# Patient Record
Sex: MALE | Race: WHITE | Employment: OTHER | URBAN - METROPOLITAN AREA
[De-identification: names, ages, dates, MRNs, and addresses within clinical notes are randomized per-mention and may not be internally consistent; named-entity substitution may affect disease eponyms.]

---

## 2017-10-13 ENCOUNTER — APPOINTMENT (OUTPATIENT)
Dept: MRI IMAGING | Age: 82
DRG: 418 | End: 2017-10-13
Attending: HOSPITALIST
Payer: MEDICARE

## 2017-10-13 ENCOUNTER — APPOINTMENT (OUTPATIENT)
Dept: ULTRASOUND IMAGING | Age: 82
DRG: 418 | End: 2017-10-13
Attending: EMERGENCY MEDICINE
Payer: MEDICARE

## 2017-10-13 ENCOUNTER — HOSPITAL ENCOUNTER (INPATIENT)
Age: 82
LOS: 6 days | Discharge: HOME | DRG: 418 | End: 2017-10-19
Attending: EMERGENCY MEDICINE | Admitting: HOSPITALIST
Payer: MEDICARE

## 2017-10-13 DIAGNOSIS — K85.90 ACUTE PANCREATITIS, UNSPECIFIED COMPLICATION STATUS, UNSPECIFIED PANCREATITIS TYPE: ICD-10-CM

## 2017-10-13 DIAGNOSIS — K80.20 GALLSTONES: ICD-10-CM

## 2017-10-13 DIAGNOSIS — R74.01 TRANSAMINITIS: Primary | ICD-10-CM

## 2017-10-13 LAB
ALBUMIN SERPL-MCNC: 3.6 G/DL (ref 3.5–5)
ALBUMIN/GLOB SERPL: 1.1 {RATIO} (ref 1.1–2.2)
ALP SERPL-CCNC: 184 U/L (ref 45–117)
ALT SERPL-CCNC: 425 U/L (ref 12–78)
ANION GAP SERPL CALC-SCNC: 8 MMOL/L (ref 5–15)
APPEARANCE UR: CLEAR
AST SERPL-CCNC: 672 U/L (ref 15–37)
BACTERIA URNS QL MICRO: NEGATIVE /HPF
BASOPHILS # BLD: 0 K/UL
BASOPHILS NFR BLD: 0 %
BILIRUB SERPL-MCNC: 2.1 MG/DL (ref 0.2–1)
BILIRUB UR QL: NEGATIVE
BUN SERPL-MCNC: 20 MG/DL (ref 6–20)
BUN/CREAT SERPL: 16 (ref 12–20)
CALCIUM SERPL-MCNC: 9.1 MG/DL (ref 8.5–10.1)
CHLORIDE SERPL-SCNC: 108 MMOL/L (ref 97–108)
CO2 SERPL-SCNC: 24 MMOL/L (ref 21–32)
COLOR UR: ABNORMAL
CREAT SERPL-MCNC: 1.23 MG/DL (ref 0.7–1.3)
DIFFERENTIAL METHOD BLD: ABNORMAL
EOSINOPHIL # BLD: 0 K/UL
EOSINOPHIL NFR BLD: 0 %
EPITH CASTS URNS QL MICRO: ABNORMAL /LPF
ERYTHROCYTE [DISTWIDTH] IN BLOOD BY AUTOMATED COUNT: 12.9 % (ref 11.5–14.5)
GLOBULIN SER CALC-MCNC: 3.2 G/DL (ref 2–4)
GLUCOSE BLD STRIP.AUTO-MCNC: 105 MG/DL (ref 65–100)
GLUCOSE BLD STRIP.AUTO-MCNC: 94 MG/DL (ref 65–100)
GLUCOSE SERPL-MCNC: 118 MG/DL (ref 65–100)
GLUCOSE UR STRIP.AUTO-MCNC: NEGATIVE MG/DL
HCT VFR BLD AUTO: 42.9 % (ref 36.6–50.3)
HGB BLD-MCNC: 14.6 G/DL (ref 12.1–17)
HGB UR QL STRIP: NEGATIVE
HYALINE CASTS URNS QL MICRO: ABNORMAL /LPF (ref 0–5)
KETONES UR QL STRIP.AUTO: NEGATIVE MG/DL
LACTATE SERPL-SCNC: 2 MMOL/L (ref 0.4–2)
LEUKOCYTE ESTERASE UR QL STRIP.AUTO: NEGATIVE
LIPASE SERPL-CCNC: >3000 U/L (ref 73–393)
LYMPHOCYTES # BLD: 0.4 K/UL
LYMPHOCYTES NFR BLD: 3 %
MCH RBC QN AUTO: 30.2 PG (ref 26–34)
MCHC RBC AUTO-ENTMCNC: 34 G/DL (ref 30–36.5)
MCV RBC AUTO: 88.6 FL (ref 80–99)
MONOCYTES # BLD: 0 K/UL
MONOCYTES NFR BLD: 0 %
NEUTS BAND NFR BLD MANUAL: 5 %
NEUTS SEG # BLD: 12.6 K/UL
NEUTS SEG NFR BLD: 92 %
NITRITE UR QL STRIP.AUTO: NEGATIVE
PH UR STRIP: 5.5 [PH] (ref 5–8)
PLATELET # BLD AUTO: 158 K/UL (ref 150–400)
POTASSIUM SERPL-SCNC: 3.9 MMOL/L (ref 3.5–5.1)
PROT SERPL-MCNC: 6.8 G/DL (ref 6.4–8.2)
PROT UR STRIP-MCNC: NEGATIVE MG/DL
RBC # BLD AUTO: 4.84 M/UL (ref 4.1–5.7)
RBC #/AREA URNS HPF: ABNORMAL /HPF (ref 0–5)
RBC MORPH BLD: ABNORMAL
SERVICE CMNT-IMP: ABNORMAL
SERVICE CMNT-IMP: NORMAL
SODIUM SERPL-SCNC: 140 MMOL/L (ref 136–145)
SP GR UR REFRACTOMETRY: 1.01 (ref 1–1.03)
UA: UC IF INDICATED,UAUC: ABNORMAL
UROBILINOGEN UR QL STRIP.AUTO: 2 EU/DL (ref 0.2–1)
WBC # BLD AUTO: 13 K/UL (ref 4.1–11.1)
WBC URNS QL MICRO: ABNORMAL /HPF (ref 0–4)

## 2017-10-13 PROCEDURE — 83605 ASSAY OF LACTIC ACID: CPT | Performed by: EMERGENCY MEDICINE

## 2017-10-13 PROCEDURE — 83690 ASSAY OF LIPASE: CPT | Performed by: EMERGENCY MEDICINE

## 2017-10-13 PROCEDURE — 82962 GLUCOSE BLOOD TEST: CPT

## 2017-10-13 PROCEDURE — 74011250636 HC RX REV CODE- 250/636: Performed by: HOSPITALIST

## 2017-10-13 PROCEDURE — 76705 ECHO EXAM OF ABDOMEN: CPT

## 2017-10-13 PROCEDURE — 65270000029 HC RM PRIVATE

## 2017-10-13 PROCEDURE — 85025 COMPLETE CBC W/AUTO DIFF WBC: CPT | Performed by: EMERGENCY MEDICINE

## 2017-10-13 PROCEDURE — 96361 HYDRATE IV INFUSION ADD-ON: CPT

## 2017-10-13 PROCEDURE — 99285 EMERGENCY DEPT VISIT HI MDM: CPT

## 2017-10-13 PROCEDURE — 74181 MRI ABDOMEN W/O CONTRAST: CPT

## 2017-10-13 PROCEDURE — 74011250636 HC RX REV CODE- 250/636: Performed by: EMERGENCY MEDICINE

## 2017-10-13 PROCEDURE — 96374 THER/PROPH/DIAG INJ IV PUSH: CPT

## 2017-10-13 PROCEDURE — 80053 COMPREHEN METABOLIC PANEL: CPT | Performed by: EMERGENCY MEDICINE

## 2017-10-13 PROCEDURE — 36415 COLL VENOUS BLD VENIPUNCTURE: CPT | Performed by: EMERGENCY MEDICINE

## 2017-10-13 PROCEDURE — 81001 URINALYSIS AUTO W/SCOPE: CPT | Performed by: EMERGENCY MEDICINE

## 2017-10-13 RX ORDER — MAGNESIUM SULFATE 100 %
4 CRYSTALS MISCELLANEOUS AS NEEDED
Status: DISCONTINUED | OUTPATIENT
Start: 2017-10-13 | End: 2017-10-19 | Stop reason: HOSPADM

## 2017-10-13 RX ORDER — ENOXAPARIN SODIUM 100 MG/ML
40 INJECTION SUBCUTANEOUS EVERY 24 HOURS
Status: DISCONTINUED | OUTPATIENT
Start: 2017-10-13 | End: 2017-10-14

## 2017-10-13 RX ORDER — SODIUM CHLORIDE 9 MG/ML
125 INJECTION, SOLUTION INTRAVENOUS CONTINUOUS
Status: DISCONTINUED | OUTPATIENT
Start: 2017-10-13 | End: 2017-10-13

## 2017-10-13 RX ORDER — MELATONIN
1000 DAILY
Status: DISCONTINUED | OUTPATIENT
Start: 2017-10-14 | End: 2017-10-19 | Stop reason: HOSPADM

## 2017-10-13 RX ORDER — SODIUM CHLORIDE 0.9 % (FLUSH) 0.9 %
5-10 SYRINGE (ML) INJECTION AS NEEDED
Status: DISCONTINUED | OUTPATIENT
Start: 2017-10-13 | End: 2017-10-19 | Stop reason: HOSPADM

## 2017-10-13 RX ORDER — ALBUTEROL SULFATE 0.83 MG/ML
2.5 SOLUTION RESPIRATORY (INHALATION)
COMMUNITY
End: 2017-11-30

## 2017-10-13 RX ORDER — ACETAMINOPHEN 325 MG/1
650 TABLET ORAL
Status: DISCONTINUED | OUTPATIENT
Start: 2017-10-13 | End: 2017-10-19 | Stop reason: HOSPADM

## 2017-10-13 RX ORDER — HYDROMORPHONE HYDROCHLORIDE 2 MG/ML
1 INJECTION, SOLUTION INTRAMUSCULAR; INTRAVENOUS; SUBCUTANEOUS
Status: DISCONTINUED | OUTPATIENT
Start: 2017-10-13 | End: 2017-10-16

## 2017-10-13 RX ORDER — SODIUM CHLORIDE, SODIUM LACTATE, POTASSIUM CHLORIDE, CALCIUM CHLORIDE 600; 310; 30; 20 MG/100ML; MG/100ML; MG/100ML; MG/100ML
150 INJECTION, SOLUTION INTRAVENOUS CONTINUOUS
Status: DISCONTINUED | OUTPATIENT
Start: 2017-10-13 | End: 2017-10-16 | Stop reason: SDUPTHER

## 2017-10-13 RX ORDER — DEXTROSE 50 % IN WATER (D50W) INTRAVENOUS SYRINGE
12.5-25 AS NEEDED
Status: DISCONTINUED | OUTPATIENT
Start: 2017-10-13 | End: 2017-10-19 | Stop reason: HOSPADM

## 2017-10-13 RX ORDER — HYDRALAZINE HYDROCHLORIDE 20 MG/ML
10 INJECTION INTRAMUSCULAR; INTRAVENOUS
Status: DISCONTINUED | OUTPATIENT
Start: 2017-10-13 | End: 2017-10-19 | Stop reason: HOSPADM

## 2017-10-13 RX ORDER — ONDANSETRON 4 MG/1
4 TABLET, ORALLY DISINTEGRATING ORAL
Status: DISCONTINUED | OUTPATIENT
Start: 2017-10-13 | End: 2017-10-19 | Stop reason: HOSPADM

## 2017-10-13 RX ORDER — ALBUTEROL SULFATE 0.83 MG/ML
2.5 SOLUTION RESPIRATORY (INHALATION)
Status: DISCONTINUED | OUTPATIENT
Start: 2017-10-13 | End: 2017-10-19 | Stop reason: HOSPADM

## 2017-10-13 RX ORDER — SODIUM CHLORIDE 0.9 % (FLUSH) 0.9 %
5-10 SYRINGE (ML) INJECTION EVERY 8 HOURS
Status: DISCONTINUED | OUTPATIENT
Start: 2017-10-13 | End: 2017-10-19 | Stop reason: HOSPADM

## 2017-10-13 RX ORDER — MORPHINE SULFATE 10 MG/ML
4 INJECTION, SOLUTION INTRAMUSCULAR; INTRAVENOUS
Status: COMPLETED | OUTPATIENT
Start: 2017-10-13 | End: 2017-10-13

## 2017-10-13 RX ORDER — INSULIN LISPRO 100 [IU]/ML
INJECTION, SOLUTION INTRAVENOUS; SUBCUTANEOUS EVERY 6 HOURS
Status: DISCONTINUED | OUTPATIENT
Start: 2017-10-13 | End: 2017-10-17

## 2017-10-13 RX ORDER — MONTELUKAST SODIUM 10 MG/1
10 TABLET ORAL DAILY
COMMUNITY

## 2017-10-13 RX ADMIN — SODIUM CHLORIDE, SODIUM LACTATE, POTASSIUM CHLORIDE, AND CALCIUM CHLORIDE 150 ML/HR: 600; 310; 30; 20 INJECTION, SOLUTION INTRAVENOUS at 20:14

## 2017-10-13 RX ADMIN — SODIUM CHLORIDE, SODIUM LACTATE, POTASSIUM CHLORIDE, AND CALCIUM CHLORIDE 150 ML/HR: 600; 310; 30; 20 INJECTION, SOLUTION INTRAVENOUS at 13:32

## 2017-10-13 RX ADMIN — Medication 10 ML: at 17:30

## 2017-10-13 RX ADMIN — SODIUM CHLORIDE 125 ML/HR: 900 INJECTION, SOLUTION INTRAVENOUS at 10:51

## 2017-10-13 RX ADMIN — ENOXAPARIN SODIUM 40 MG: 40 INJECTION SUBCUTANEOUS at 17:29

## 2017-10-13 RX ADMIN — Medication 10 ML: at 20:14

## 2017-10-13 RX ADMIN — SODIUM CHLORIDE 1000 ML: 900 INJECTION, SOLUTION INTRAVENOUS at 05:54

## 2017-10-13 RX ADMIN — MORPHINE SULFATE 4 MG: 10 INJECTION INTRAMUSCULAR; INTRAVENOUS; SUBCUTANEOUS at 05:54

## 2017-10-13 NOTE — ED NOTES
Called [place] where patient is a resident and updated Jasiel Mckenzie that patients plan is to be admitted.

## 2017-10-13 NOTE — H&P
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 Abd US with gallstone without cholecystitis or CBD. Will get MRCP abdomen, consult GI. Dilaudid prn.  --check stool culture, c. Diff, ova and parasite if diarrhea recurs  --hold BP meds for now, IV hydralazine prn  --continue vitamin D  --seen Dr. Christopher Torres in  for potential cholecystectomy but did not have surgery because Dr. Christopher Torres gave him medication to take and belly pain resolved. Body mass index is 29.23 kg/(m^2). Code: full  DVT prophylaxis: lovenox  Surrogate decision maker:  wife        Subjective:   CHIEF COMPLAINT:  Bilateral lower abdominal pain, diarrhea    HISTORY OF PRESENT ILLNESS:     Mt Camacho is a 80 y.o.  male from [address] home independent living who presents with bilateral lower abdominal pain, 6 to 8/10, crampy and like \"dagger\", started a few hours after dinner of spaghetti and meatballs, associated with severe chills (shaking in arms and legs) and diarrhea. Abdominal pain relieved by diarrhea. Denies any blood in stool. +nausea, no vomiting. No further diarrhea since arrived to ER. Pain relieved with morphine 4mg x 1 dose in ER and currently minimal pain. No fever, no weight loss. Appetite good. +abdominal bloating today. Has hx of gallstone and seen Dr. Christopher Torres in 2014 with plan for lap samir and liver biopsy but reported that he was given some medication which treated abdominal pain and he did not have surgery after all.     In ER, labs noted for lipase >3000, , , alk phos 184, t. Bili 2.1. Limited abd US RUQ showed liver is normal in appearance without evidence of mass lesion or biliary  dilatation. Gallstones are noted. There is no gallbladder wall thickening or  sonographic Mckeon's sign. Common bile duct is normal in size. The right kidney  is normal in size and appearance without evidence of mass lesion,  hydronephrosis, or calcification. The pancreas is not visualized due to bowel  gas. No free fluid. Denies any prior hx of pancreatitis. No alcohol use. We were asked to admit for work up and evaluation of the above problems. Past Medical History:   Diagnosis Date    Allergic rhinitis     Arthritis of left knee     Elevated alkaline phosphatase level     Essential hypertension, benign     Gallstone     Gout     Hearing loss     Musculoskeletal disorder       Past Surgical History:   Procedure Laterality Date    HX HEENT  05/27/2014    cataract    HX HEENT  05/13/2014    cataract    HX ORTHOPAEDIC      left knee cartilage repair    HX OTHER SURGICAL      surgery on lungs as baby     Social History   Substance Use Topics    Smoking status: Never Smoker    Smokeless tobacco: Never Used    Alcohol use No    Drug use:  denies  , [facility], independent ADLs. Active -- lift weights, treadmill, does outdoor maintenance    Family History   Problem Relation Age of Onset    Cancer Mother     Alzheimer Father       No Known Allergies     Prior to Admission medications    Medication Sig Start Date End Date Taking? Authorizing Provider   montelukast (SINGULAIR) 10 mg tablet Take 10 mg by mouth daily. Yes Historical Provider   albuterol (PROVENTIL VENTOLIN) 2.5 mg /3 mL (0.083 %) nebulizer solution 2.5 mg by Nebulization route every six (6) hours as needed for Wheezing. 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    Yes Historical Provider   lisinopril (PRINIVIL, ZESTRIL) 20 mg tablet Take 20 mg by mouth daily. Yes Historical Provider   loratadine (CLARITIN) 10 mg tablet Take 10 mg by mouth daily. Yes Historical Provider   cholecalciferol (VITAMIN D3) 1,000 unit cap Take 1,000 Units by mouth daily. Yes Historical Provider   ibuprofen (MOTRIN) 600 mg tablet Take 600 mg by mouth two (2) times daily as needed for Pain (Knee pain). Yes Historical Provider   guaiFENesin ER (MUCINEX) 600 mg ER tablet Take 600 mg by mouth daily as needed for Congestion. Historical Provider   albuterol (VENTOLIN HFA) 90 mcg/actuation inhaler Take 2 Puffs by inhalation every four (4) hours as needed for Wheezing or Shortness of Breath (Bronchitis). Historical Provider   diclofenac (VOLTAREN) 1 % gel Apply  to affected area two (2) times daily as needed. Apply a thin layer to the foot/knee two (2) times daily as needed for pain    Historical Provider   fluticasone (FLONASE) 50 mcg/actuation nasal spray 1 [place] by Both Nostrils route daily as needed for Rhinitis or Allergies. Historical Provider     REVIEW OF SYSTEMS:  POSITIVE= Bold.   Negative = normal text  General:  fever, chills, sweats, generalized weakness, weight loss/gain, loss of appetite  Eyes:  blurred vision, eye pain, loss of vision, diplopia  Ear Nose and Throat:  rhinorrhea, pharyngitis  Respiratory:   cough, sputum production, SOB, wheezing, ADRIAN, pleuritic pain  Cardiology:  chest pain, palpitations, orthopnea, PND, edema, syncope   Gastrointestinal:  abdominal pain and distention, N/V, dysphagia, diarrhea, constipation, bleeding  Genitourinary:  frequency, urgency, dysuria, hematuria, incontinence  Muskuloskeletal :  arthralgia, myalgia  Hematology:  easy bruising, bleeding, lymphadenopathy  Dermatological:  rash, ulceration, pruritis  Endocrine:  hot flashes or polydipsia  Neurological:  headache, dizziness, confusion, focal weakness, paresthesia, memory loss, gait disturbance  Psychological: anxiety, depression, agitation      Objective:   VITALS:    Visit Vitals    /79 (BP 1 Location: Left arm, BP Patient Position: At rest)    Pulse 75    Temp 97.7 °F (36.5 °C)    Resp 16    Ht 5' 3\" (1.6 m)    Wt 74.8 kg (165 lb)    SpO2 96%    BMI 29.23 kg/m2     Temp (24hrs), Av °F (36.7 °C), Min:97.7 °F (36.5 °C), Max:98.3 °F (36.8 °C)    Body mass index is 29.23 kg/(m^2). PHYSICAL EXAM:    General:    Alert, cooperative, no distress, appears stated age. HEENT: Atraumatic, anicteric sclerae, pink conjunctivae     No oral ulcers, mucosa slightly dry, throat clear. Hearing intact. Neck:  Supple, symmetrical,  thyroid: non tender  Lungs:   Clear to auscultation bilaterally. No Wheezing or Rhonchi. No rales. Chest wall:  No tenderness  No Accessory muscle use. Heart:   Regular  rhythm,  No  murmur   No gallop. Trace right ankle edema. Abdomen:   Protuberant, mildly distended, non-tender. Bowel sounds normal. No masses  Extremities: No cyanosis. No clubbing  Skin:     Not pale Not Jaundiced  No rashes Feet cool to touch, right lower leg varicose veins  Psych:  Good insight. Not depressed. Not anxious or agitated. Neurologic: EOMs intact. No facial asymmetry. No aphasia or slurred speech. Symmetrical strength, Alert and oriented X 3.      IMAGING RESULTS:   []       I have personally reviewed the actual   []     CXR  []     CT scan  CXR:  CT :  EKG:   ________________________________________________________________________  Care Plan discussed with:    Comments   Patient y    [facility]:      ________________________________________________________________________  Prophylaxis:  GI none   DVT lovenox   ________________________________________________________________________  Recommended Disposition:   Home with Family y   HH/PT/OT/RN    SNF/LTC    JOEL ________________________________________________________________________  Code Status:  Full Code y   DNR/DNI    ________________________________________________________________________  TOTAL TIME:  50 minutes      Comments    y Reviewed previous records     ______________________________________________________________________  Stephanie Laws MD      Procedures: see electronic medical records for all procedures/Xrays and details which were not copied into this note but were reviewed prior to creation of Plan. LAB DATA REVIEWED:    Recent Results (from the past 24 hour(s))   CBC WITH AUTOMATED DIFF    Collection Time: 10/13/17  5:55 AM   Result Value Ref Range    WBC 13.0 (H) 4.1 - 11.1 K/uL    RBC 4.84 4.10 - 5.70 M/uL    HGB 14.6 12.1 - 17.0 g/dL    HCT 42.9 36.6 - 50.3 %    MCV 88.6 80.0 - 99.0 FL    MCH 30.2 26.0 - 34.0 PG    MCHC 34.0 30.0 - 36.5 g/dL    RDW 12.9 11.5 - 14.5 %    PLATELET 365 104 - 758 K/uL    NEUTROPHILS 92 %    BAND NEUTROPHILS 5 %    LYMPHOCYTES 3 %    MONOCYTES 0 %    EOSINOPHILS 0 %    BASOPHILS 0 %    ABS. NEUTROPHILS 12.6 K/UL    ABS. LYMPHOCYTES 0.4 K/UL    ABS. MONOCYTES 0.0 K/UL    ABS. EOSINOPHILS 0.0 K/UL    ABS. BASOPHILS 0.0 K/UL    RBC COMMENTS NORMOCYTIC, NORMOCHROMIC      DF MANUAL     METABOLIC PANEL, COMPREHENSIVE    Collection Time: 10/13/17  5:55 AM   Result Value Ref Range    Sodium 140 136 - 145 mmol/L    Potassium 3.9 3.5 - 5.1 mmol/L    Chloride 108 97 - 108 mmol/L    CO2 24 21 - 32 mmol/L    Anion gap 8 5 - 15 mmol/L    Glucose 118 (H) 65 - 100 mg/dL    BUN 20 6 - 20 MG/DL    Creatinine 1.23 0.70 - 1.30 MG/DL    BUN/Creatinine ratio 16 12 - 20      GFR est AA >60 >60 ml/min/1.73m2    GFR est non-AA 56 (L) >60 ml/min/1.73m2    Calcium 9.1 8.5 - 10.1 MG/DL    Bilirubin, total 2.1 (H) 0.2 - 1.0 MG/DL    ALT (SGPT) 425 (H) 12 - 78 U/L    AST (SGOT) 672 (H) 15 - 37 U/L    Alk.  phosphatase 184 (H) 45 - 117 U/L    Protein, total 6.8 6.4 - 8.2 g/dL    Albumin 3.6 3.5 - 5.0 g/dL    Globulin 3.2 2.0 - 4.0 g/dL    A-G Ratio 1.1 1.1 - 2.2     LIPASE    Collection Time: 10/13/17  5:55 AM   Result Value Ref Range    Lipase >3000 (H) 73 - 393 U/L   LACTIC ACID    Collection Time: 10/13/17  5:55 AM   Result Value Ref Range    Lactic acid 2.0 0.4 - 2.0 MMOL/L   URINALYSIS W/ REFLEX CULTURE    Collection Time: 10/13/17  6:12 AM   Result Value Ref Range    Color DARK YELLOW      Appearance CLEAR CLEAR      Specific gravity 1.015 1.003 - 1.030      pH (UA) 5.5 5.0 - 8.0      Protein NEGATIVE  NEG mg/dL    Glucose NEGATIVE  NEG mg/dL    Ketone NEGATIVE  NEG mg/dL    Bilirubin NEGATIVE  NEG      Blood NEGATIVE  NEG      Urobilinogen 2.0 (H) 0.2 - 1.0 EU/dL    Nitrites NEGATIVE  NEG      Leukocyte Esterase NEGATIVE  NEG      WBC 0-4 0 - 4 /hpf    RBC 0-5 0 - 5 /hpf    Epithelial cells FEW FEW /lpf    Bacteria NEGATIVE  NEG /hpf    UA:UC IF INDICATED CULTURE NOT INDICATED BY UA RESULT CNI      Hyaline cast 0-2 0 - 5 /lpf

## 2017-10-13 NOTE — PROGRESS NOTES
Pharmacy Clarification of Prior to Admission Medication Regimen     The patient was not interviewed regarding clarification of the prior to admission medication regimen (pt resident of [facility] assisted living [facility] and does not know what medications he takes). Patient was not questioned regarding use of any other inhalers, topical products, over the counter medications, herbal medications, vitamin products or ophthalmic/nasal/otic medication use. Information Obtained From: [address] transfer papers, [facility] nurse Malu Oliveira)    Pertinent Pharmacy Findings:   Patient's nurse at [facility], Joaquin, reports that patient took 2 tablets of ibuprofen (MOTRIN) 600 mg tablet yesterday (10/12/17). PTA medication list was corrected to the following:     Prior to Admission Medications   Prescriptions Last Dose Informant Patient Reported? Taking? albuterol (PROVENTIL VENTOLIN) 2.5 mg /3 mL (0.083 %) nebulizer solution 10/12/2017 at Unknown time Transfer Papers Yes Yes   Si.5 mg by Nebulization route every six (6) hours as needed for Wheezing. 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 amLODIPine (NORVASC) 5 mg tablet 10/12/2017 at Unknown time Transfer Papers Yes Yes   Sig: Take 5 mg by mouth daily. cholecalciferol (VITAMIN D3) 1,000 unit cap 10/12/2017 at Unknown time Transfer Papers Yes Yes   Sig: Take 1,000 Units by mouth daily. diclofenac (VOLTAREN) 1 % gel Unknown at Unknown time Transfer Papers Yes No   Sig: Apply  to affected area two (2) times daily as needed.  Apply a thin layer to the foot/knee two (2) times daily as needed for pain   fluticasone (FLONASE) 50 mcg/actuation nasal spray Unknown at Unknown time Transfer Papers Yes No   Si [place] by Both Nostrils route daily as needed for Rhinitis or Allergies. guaiFENesin ER (MUCINEX) 600 mg ER tablet Unknown at Unknown time Other Yes No   Sig: Take 600 mg by mouth daily as needed for Congestion. ibuprofen (MOTRIN) 600 mg tablet 10/12/2017 at Unknown time Transfer Papers Yes Yes   Sig: Take 600 mg by mouth two (2) times daily as needed for Pain (Knee pain). lisinopril (PRINIVIL, ZESTRIL) 20 mg tablet 10/12/2017 at Unknown time Transfer Papers Yes Yes   Sig: Take 20 mg by mouth daily. loratadine (CLARITIN) 10 mg tablet 10/12/2017 at Unknown time Transfer Papers Yes Yes   Sig: Take 10 mg by mouth daily. montelukast (SINGULAIR) 10 mg tablet 10/12/2017 at Unknown time Transfer Papers Yes Yes   Sig: Take 10 mg by mouth daily. Facility-Administered Medications: None          Thank you,  Dinora Rivera  Pharm D.  Candidate   LUIS Geronimo

## 2017-10-13 NOTE — ED NOTES
Bedside shift change report given to Chucho Plaza. (oncoming nurse) by Jerry Patino. (offgoing nurse). Report included the following information SBAR, ED Summary and MAR.

## 2017-10-13 NOTE — ED NOTES
Patient resting in bed with eyes closed at this time, awakens easily. No needs or concerns voiced. Will continue to monitor. Call light in reach.

## 2017-10-13 NOTE — ED PROVIDER NOTES
[facility].  EMERGENCY DEPARTMENT HISTORY AND PHYSICAL EXAM       Date of Service: 10/13/2017   Patient Name: Larry Bynum   YOB: 1935  Medical Record Number: [id number]    History of Presenting Illness     Chief Complaint   Patient presents with    Abdominal Pain     Patient arrived via EMS with c/o diffuse lower abdominal pain that started last night around 5:30 after dinner. History Provided By:  patient    Additional History:   Larry Bynum is a 80 y.o. male with PMhx significant for gall stones, elevated Alk Phos, HTN,  who presents ambulatory to the ED with cc of diffuse lower abdominal pain. Pt states he has had this pain before but not in a long time. However, after he ate he developed severe pain and some nausea. He has not taken anything for pain. Pain is persistent at this time. He denies vomiting, diarrhea, fever/chillls or known sick contacts. Social Hx:-Tobacco, - EtOH, -Illicit Drugs    There are no other complaints, changes or physical findings at this time.     Primary Care Provider: Letty Campbell MD       Past History     Past Medical History:   Past Medical History:   Diagnosis Date    Allergic rhinitis     Arthritis of left knee     Elevated alkaline phosphatase level     Essential hypertension, benign     Gallstone     Gout     Hearing loss     Musculoskeletal disorder         Past Surgical History:   Past Surgical History:   Procedure Laterality Date    HX HEENT  05/27/2014    cataract    HX HEENT  05/13/2014    cataract    HX ORTHOPAEDIC      left knee cartilage repair    HX OTHER SURGICAL      surgery on lungs as baby        Family History:   Family History   Problem Relation Age of Onset    Cancer Mother     Alzheimer Father         Social History:   Social History   Substance Use Topics    Smoking status: Never Smoker    Smokeless tobacco: Never Used    Alcohol use No        Allergies:   No Known Allergies      Review of Systems   Review of Systems   Constitutional: Negative for chills and fever. HENT: Positive for dental problem. Negative for congestion, rhinorrhea, sneezing and sore throat. Eyes: Negative. Negative for redness and visual disturbance. Respiratory: Negative. Negative for cough, shortness of breath and wheezing. Cardiovascular: Negative. Negative for chest pain and leg swelling. Gastrointestinal: Negative. Negative for abdominal pain, diarrhea, nausea and vomiting. Genitourinary: Negative. Negative for difficulty urinating, discharge and frequency. Musculoskeletal: Negative. Negative for arthralgias, back pain, myalgias and neck stiffness. Skin: Negative. Negative for color change and rash. Neurological: Negative. Negative for dizziness, syncope, weakness, numbness and headaches. Hematological: Negative for adenopathy. Psychiatric/Behavioral: Negative. All other systems reviewed and are negative. Physical Exam  Physical Exam   Constitutional: He is oriented to person, place, and time. HENT:   Head: Atraumatic. Eyes: EOM are normal.   Cardiovascular: Normal rate, regular rhythm, normal heart sounds and intact distal pulses. Exam reveals no gallop and no friction rub. No murmur heard. Pulmonary/Chest: Effort normal and breath sounds normal. No respiratory distress. He has no wheezes. He has no rales. He exhibits no tenderness. Abdominal: Soft. Bowel sounds are normal. He exhibits no distension and no mass. There is tenderness. There is no rebound and no guarding. Mild diffuse ttp over mid abd, no specific RUQ ttp,   Mild epigastric ttp  No rebound, guarding or peritoneal signs. Musculoskeletal: Normal range of motion. He exhibits no edema or tenderness. Neurological: He is alert and oriented to person, place, and time. Psychiatric: He has a normal mood and affect. Nursing note and vitals reviewed.         Medical Decision Making   I am the first provider for this patient. I reviewed the vital signs, available nursing notes, past medical history, past surgical history, family history and social history. Provider Notes:   DDX: Pt has h/o of gall stones, had been scheduled for surgery but never followed thru. He has not had any symptoms until tonight since episode almost four years ago. Possible obstructing gall stone, cholecystitis (although no significant RUQ on exam), pancreatitis, colitis, diverticulitis. ED Course:  5:51 AM   Initial assessment performed. The patients presenting problems have been discussed, and they are in agreement with the care plan formulated and outlined with them. I have encouraged them to ask questions as they arise throughout their visit. Progress Notes:   7:43 AM   Patient care will be transferred to [address] [place] [address]. Geronimo Olmedo MD.  Discussed available diagnostic results and care plan at length. Pt made aware of provider change. Written by Misty Herrera, ED Scribe, as dictated by Shikha Reeves MD.     Diagnostic Study Results     Labs -      Recent Results (from the past 12 hour(s))   CBC WITH AUTOMATED DIFF    Collection Time: 10/13/17  5:55 AM   Result Value Ref Range    WBC 13.0 (H) 4.1 - 11.1 K/uL    RBC 4.84 4.10 - 5.70 M/uL    HGB 14.6 12.1 - 17.0 g/dL    HCT 42.9 36.6 - 50.3 %    MCV 88.6 80.0 - 99.0 FL    MCH 30.2 26.0 - 34.0 PG    MCHC 34.0 30.0 - 36.5 g/dL    RDW 12.9 11.5 - 14.5 %    PLATELET 210 722 - 621 K/uL    NEUTROPHILS 92 %    BAND NEUTROPHILS 5 %    LYMPHOCYTES 3 %    MONOCYTES 0 %    EOSINOPHILS 0 %    BASOPHILS 0 %    ABS. NEUTROPHILS 12.6 K/UL    ABS. LYMPHOCYTES 0.4 K/UL    ABS. MONOCYTES 0.0 K/UL    ABS. EOSINOPHILS 0.0 K/UL    ABS.  BASOPHILS 0.0 K/UL    RBC COMMENTS NORMOCYTIC, NORMOCHROMIC      DF MANUAL     METABOLIC PANEL, COMPREHENSIVE    Collection Time: 10/13/17  5:55 AM   Result Value Ref Range    Sodium 140 136 - 145 mmol/L    Potassium 3.9 3.5 - 5.1 mmol/L    Chloride 108 97 - 108 mmol/L    CO2 24 21 - 32 mmol/L    Anion gap 8 5 - 15 mmol/L    Glucose 118 (H) 65 - 100 mg/dL    BUN 20 6 - 20 MG/DL    Creatinine 1.23 0.70 - 1.30 MG/DL    BUN/Creatinine ratio 16 12 - 20      GFR est AA >60 >60 ml/min/1.73m2    GFR est non-AA 56 (L) >60 ml/min/1.73m2    Calcium 9.1 8.5 - 10.1 MG/DL    Bilirubin, total 2.1 (H) 0.2 - 1.0 MG/DL    ALT (SGPT) 425 (H) 12 - 78 U/L    AST (SGOT) 672 (H) 15 - 37 U/L    Alk. phosphatase 184 (H) 45 - 117 U/L    Protein, total 6.8 6.4 - 8.2 g/dL    Albumin 3.6 3.5 - 5.0 g/dL    Globulin 3.2 2.0 - 4.0 g/dL    A-G Ratio 1.1 1.1 - 2.2     LIPASE    Collection Time: 10/13/17  5:55 AM   Result Value Ref Range    Lipase >3000 (H) 73 - 393 U/L   LACTIC ACID    Collection Time: 10/13/17  5:55 AM   Result Value Ref Range    Lactic acid 2.0 0.4 - 2.0 MMOL/L   URINALYSIS W/ REFLEX CULTURE    Collection Time: 10/13/17  6:12 AM   Result Value Ref Range    Color DARK YELLOW      Appearance CLEAR CLEAR      Specific gravity 1.015 1.003 - 1.030      pH (UA) 5.5 5.0 - 8.0      Protein NEGATIVE  NEG mg/dL    Glucose NEGATIVE  NEG mg/dL    Ketone NEGATIVE  NEG mg/dL    Bilirubin NEGATIVE  NEG      Blood NEGATIVE  NEG      Urobilinogen 2.0 (H) 0.2 - 1.0 EU/dL    Nitrites NEGATIVE  NEG      Leukocyte Esterase NEGATIVE  NEG      WBC 0-4 0 - 4 /hpf    RBC 0-5 0 - 5 /hpf    Epithelial cells FEW FEW /lpf    Bacteria NEGATIVE  NEG /hpf    UA:UC IF INDICATED CULTURE NOT INDICATED BY UA RESULT CNI      Hyaline cast 0-2 0 - 5 /lpf       Radiologic Studies -  The following have been ordered and reviewed:  US ABD LTD   Final Result   Indication: Abdominal pain, history of gallstones     Comparison to 7/15/2014     Real-time sonographic imaging of the right upper quadrant was performed. The  liver is normal in appearance without evidence of mass lesion or biliary  dilatation. Gallstones are noted. There is no gallbladder wall thickening or  sonographic Mckeon's sign.  Common bile duct is normal in size. The right kidney  is normal in size and appearance without evidence of mass lesion,  hydronephrosis, or calcification. The pancreas is not visualized due to bowel  gas. No free fluid.     IMPRESSION  Impression: Cholelithiasis without evidence to suggest acute cholecystitis.      MRI ABD WO CONT    (Results Pending)       Vital Signs-Reviewed the patient's vital signs. Patient Vitals for the past 12 hrs:   Temp Pulse Resp BP SpO2   10/13/17 0700 - - - 125/66 94 %   10/13/17 0630 - - - 143/67 94 %   10/13/17 0600 - - - 105/61 94 %   10/13/17 0543 98.3 °F (36.8 °C) 89 18 163/72 96 %       Medications Given in the ED:  Medications   sodium chloride 0.9 % bolus infusion 1,000 mL (0 mL IntraVENous IV Completed 10/13/17 0647)   morphine injection 4 mg (4 mg IntraVENous Given 10/13/17 0554)         Diagnosis   Clinical Impression:   1. Transaminitis    2. Acute pancreatitis, unspecified complication status, unspecified pancreatitis type         Plan:  1: Admission    Disposition Note:  12:00 PM  Patient is being admitted to the hospital by Dr. Shawn Dean. The results of their tests and reasons for their admission have been discussed with them and/or available family. They convey agreement and understanding for the need to be admitted and for their admission diagnosis. Consultation has been made with the inpatient physician specialist for hospitalization. Written by Kaycee Mccarthy ED Scribe, as dictated by Tahira Burdick MD.   _______________________________   Attestations: This note is prepared by Angela Johnson, acting as Scribe for MD Tahira Mcintosh MD: The scribe's documentation has been prepared under my direction and personally reviewed by me in its entirety.  I confirm that the note above accurately reflects all work, treatment, procedures, and medical decision making performed by me.  _______________________________

## 2017-10-13 NOTE — ED NOTES
TRANSFER - OUT REPORT:    Verbal report given to desiree rn(name) on Burnie Flattery  being transferred to gen surg(unit) for routine progression of care       Report consisted of patients Situation, Background, Assessment and   Recommendations(SBAR). Information from the following report(s) SBAR, Kardex, ED Summary, [facility] and Recent Results was reviewed with the receiving nurse. Lines:   Peripheral IV 10/13/17 Right Forearm (Active)   Site Assessment Clean, dry, & intact 10/13/2017  5:53 AM   Dressing Status Clean, dry, & intact 10/13/2017  5:53 AM        Opportunity for questions and clarification was provided.       Patient transported with:

## 2017-10-13 NOTE — ROUTINE PROCESS
Bedside shift change report given to Vi Gaxiola (oncoming nurse) by [facility] (offgoing nurse). Report included the following information SBAR, Kardex, ED Summary, Intake/Output, MAR, Accordion, Recent Results and Med Rec Status.

## 2017-10-13 NOTE — ED NOTES
Patient arrived via EMS with c/o abdominal pain in the bilateral lower quandrants, tender to palpation. States started after dinner last night. Reports pain is 7/10 at this time. Dr. Akila Oreilly at bedside.

## 2017-10-13 NOTE — IP AVS SNAPSHOT
[address] [facility] 
[phone] Patient: Marshall Heath MRN: [id number] [facility]:[birth year] You are allergic to the following No active allergies Immunizations Administered for This Admission Name Date Influenza Vaccine (Quad) PF  Deferred () Recent Documentation Height Weight BMI Smoking Status 1.6 m 74.4 kg 29.05 kg/m2 Never Smoker Emergency Contacts Name Discharge Info Relation Home Work Mobile Vida Palm DISCHARGE CAREGIVER [3] Spouse [3] [phone] About your hospitalization You were admitted on:  October 13, 2017 You last received care in the:  [facility] 2 GENERAL SURGERY You were discharged on:  October 19, 2017 Unit phone number:  [phone] Why you were hospitalized Your primary diagnosis was:  Acute Pancreatitis Your diagnoses also included:  Gallstones Providers Seen During Your Hospitalizations Provider Role Specialty Primary office phone Jes Khan MD Attending Provider Emergency Medicine [phone] Jelena Mckeon MD Attending Provider Internal Medicine [phone] Katelynn Bennett MD Attending Provider Internal Medicine [phone] Lydia Bustillos MD Attending Provider Hospitalist [phone] Your Primary Care Physician (PCP) Primary Care Physician Office Phone Office Fax Solange Stephanie [phone] Follow-up Information Follow up With Details Comments Contact Info Constance Cutler MD Go on 11/20/2017 Surgery follow up at [address] [address] [address] [facility] 
[phone] Inge Romero MD  Patient needs referral from PCP to see Dr. Ann-Marie Diallo [address] [place] 
[phone] Shirley Hamilton MD In 2 weeks  [address] [facility] [address] [facility] 
[phone] Your Appointments Monday October 23, 2017 10:30 AM EDT Follow Up with Georgina Osuna MD  
[facility] [address] [place] [id number] [phone] Current Discharge Medication List  
  
START taking these medications Dose & Instructions Dispensing Information Comments Morning Noon Evening Bedtime  
 docusate sodium 100 mg capsule Commonly known as:  Elsy Sotelo Your last dose was: Your next dose is:    
   
   
 Dose:  100 mg Take 1 Cap by mouth two (2) times daily as needed for Constipation for up to 90 days. Quantity:  60 Cap Refills:  2 HYDROcodone-acetaminophen 5-325 mg per tablet Commonly known as:  Benetta Obey Your last dose was: Your next dose is:    
   
   
 Dose:  1 Tab Take 1 Tab by mouth every eight (8) hours as needed. Max Daily Amount: 3 Tabs. Quantity:  10 Tab Refills:  0  
     
   
   
   
  
 ondansetron 4 mg disintegrating tablet Commonly known as:  ZOFRAN ODT Your last dose was: Your next dose is:    
   
   
 Dose:  4 mg Take 1 Tab by mouth every six (6) hours as needed. Quantity:  10 Tab Refills:  0 CONTINUE these medications which have NOT CHANGED Dose & Instructions Dispensing Information Comments Morning Noon Evening Bedtime  
 amLODIPine 5 mg tablet Commonly known as:  Fabian Gamboa Your last dose was: Your next dose is:    
   
   
 Dose:  5 mg Take 5 mg by mouth daily. Refills:  0  
     
   
   
   
  
 FLONASE 50 mcg/actuation nasal spray Generic drug:  fluticasone Your last dose was: Your next dose is:    
   
   
 Dose:  1 Spray 1 [place] by Both Nostrils route daily as needed for Rhinitis or Allergies. Refills:  0  
     
   
   
   
  
 ibuprofen 600 mg tablet Commonly known as:  MOTRIN Your last dose was: Your next dose is:    
   
   
 Dose:  600 mg Take 600 mg by mouth two (2) times daily as needed for Pain (Knee pain). Refills:  0  
     
   
   
   
  
 lisinopril 20 mg tablet Commonly known as:  Michael Shutters Your last dose was: Your next dose is:    
   
   
 Dose:  20 mg Take 20 mg by mouth daily. Refills:  0  
     
   
   
   
  
 loratadine 10 mg tablet Commonly known as:  [place]inee Hippo Your last dose was: Your next dose is:    
   
   
 Dose:  10 mg Take 10 mg by mouth daily. Refills:  0  
     
   
   
   
  
 montelukast 10 mg tablet Commonly known as:  SINGULAIR Your last dose was: Your next dose is:    
   
   
 Dose:  10 mg Take 10 mg by mouth daily. Refills:  0 MUCINEX 600 mg ER tablet Generic drug:  guaiFENesin ER Your last dose was: Your next dose is:    
   
   
 Dose:  600 mg Take 600 mg by mouth daily as needed for Congestion. Refills:  0  
     
   
   
   
  
 * VENTOLIN HFA 90 mcg/actuation inhaler Generic drug:  albuterol Your last dose was: Your next dose is:    
   
   
 Dose:  2 Puff Take 2 Puffs by inhalation every four (4) hours as needed for Wheezing or Shortness of Breath (Bronchitis). Refills:  0  
     
   
   
   
  
 * albuterol 2.5 mg /3 mL (0.083 %) nebulizer solution Commonly known as:  PROVENTIL VENTOLIN Your last dose was: Your next dose is:    
   
   
 Dose:  2.5 mg  
2.5 mg by Nebulization route every six (6) hours as needed for Wheezing. Inhale 3 mL by nebulizer every six (6) hours AS NEEDED for COPD while awake Refills:  0  
     
   
   
   
  
 VITAMIN D3 1,000 unit Cap Generic drug:  cholecalciferol Your last dose was: Your next dose is:    
   
   
 Dose:  1000 Units Take 1,000 Units by mouth daily. Refills:  0 VOLTAREN 1 % Gel Generic drug:  diclofenac Your last dose was: Your next dose is:    
   
   
 Apply  to affected area two (2) times daily as needed. Apply a thin layer to the foot/knee two (2) times daily as needed for pain Refills:  0  
     
   
   
   
  
 * Notice: This list has 2 medication(s) that are the same as other medications prescribed for you. Read the directions carefully, and ask your doctor or other care provider to review them with you. Where to Get Your Medications Information on where to get these meds will be given to you by the nurse or doctor. ! Ask your nurse or doctor about these medications  
  docusate sodium 100 mg capsule HYDROcodone-acetaminophen 5-325 mg per tablet  
 ondansetron 4 mg disintegrating tablet Discharge Instructions None Discharge Orders None Introducing [facility] SERVICES! Elvi Lee introduces [employer] patient portal. Now you can access parts of your medical record, email your doctor's office, and request medication refills online. 1. In your internet browser, go to [email or web address] 2. Click on the First Time User? Click Here link in the Sign In box. You will see the New Member Sign Up page. 3. Enter your [employer] Access Code exactly as it appears below. You will not need to use this code after youve completed the sign-up process. If you do not sign up before the expiration date, you must request a new code. · [employer] Access Code: [email or web address] Expires: 1/11/2018  5:59 AM 
 
4. Enter the last four digits of your Social Security Number (xxxx) and Date of Birth (mm/dd/yyyy) as indicated and click Submit. You will be taken to the next sign-up page. 5. Create a [employer] ID. This will be your [employer] login ID and cannot be changed, so think of one that is secure and easy to remember. 6. Create a [employer] password. You can change your password at any time. 7. Enter your Password Reset Question and Answer. This can be used at a later time if you forget your password. 8. Enter your e-mail address. You will receive e-mail notification when new information is available in [address] 9. Click Sign Up. You can now view and download portions of your medical record. 10. Click the Download Summary menu link to download a portable copy of your medical information. If you have questions, please visit the Frequently Asked Questions section of the [employer] website. Remember, [employer] is NOT to be used for urgent needs. For medical emergencies, dial 911. Now available from your iPhone and Android! General Information Please provide this summary of care documentation to your next provider. Patient Signature:  ____________________________________________________________ Date:  ____________________________________________________________  
  
[facility] Provider Signature:  ____________________________________________________________ Date:  ____________________________________________________________

## 2017-10-14 LAB
ALBUMIN SERPL-MCNC: 2.9 G/DL (ref 3.5–5)
ALBUMIN/GLOB SERPL: 0.9 {RATIO} (ref 1.1–2.2)
ALP SERPL-CCNC: 156 U/L (ref 45–117)
ALT SERPL-CCNC: 329 U/L (ref 12–78)
ANION GAP SERPL CALC-SCNC: 9 MMOL/L (ref 5–15)
AST SERPL-CCNC: 190 U/L (ref 15–37)
BASOPHILS # BLD: 0 K/UL (ref 0–0.1)
BASOPHILS NFR BLD: 0 % (ref 0–1)
BILIRUB SERPL-MCNC: 2.6 MG/DL (ref 0.2–1)
BUN SERPL-MCNC: 15 MG/DL (ref 6–20)
BUN/CREAT SERPL: 14 (ref 12–20)
CALCIUM SERPL-MCNC: 8.7 MG/DL (ref 8.5–10.1)
CHLORIDE SERPL-SCNC: 111 MMOL/L (ref 97–108)
CO2 SERPL-SCNC: 23 MMOL/L (ref 21–32)
CREAT SERPL-MCNC: 1.07 MG/DL (ref 0.7–1.3)
EOSINOPHIL # BLD: 0.1 K/UL (ref 0–0.4)
EOSINOPHIL NFR BLD: 0 % (ref 0–7)
ERYTHROCYTE [DISTWIDTH] IN BLOOD BY AUTOMATED COUNT: 13.3 % (ref 11.5–14.5)
GLOBULIN SER CALC-MCNC: 3.1 G/DL (ref 2–4)
GLUCOSE BLD STRIP.AUTO-MCNC: 88 MG/DL (ref 65–100)
GLUCOSE BLD STRIP.AUTO-MCNC: 90 MG/DL (ref 65–100)
GLUCOSE BLD STRIP.AUTO-MCNC: 93 MG/DL (ref 65–100)
GLUCOSE BLD STRIP.AUTO-MCNC: 94 MG/DL (ref 65–100)
GLUCOSE SERPL-MCNC: 91 MG/DL (ref 65–100)
HCT VFR BLD AUTO: 37.9 % (ref 36.6–50.3)
HGB BLD-MCNC: 13.2 G/DL (ref 12.1–17)
LIPASE SERPL-CCNC: 2271 U/L (ref 73–393)
LYMPHOCYTES # BLD: 0.8 K/UL (ref 0.8–3.5)
LYMPHOCYTES NFR BLD: 7 % (ref 12–49)
MAGNESIUM SERPL-MCNC: 1.8 MG/DL (ref 1.6–2.4)
MCH RBC QN AUTO: 31.4 PG (ref 26–34)
MCHC RBC AUTO-ENTMCNC: 34.8 G/DL (ref 30–36.5)
MCV RBC AUTO: 90 FL (ref 80–99)
MONOCYTES # BLD: 0.7 K/UL (ref 0–1)
MONOCYTES NFR BLD: 6 % (ref 5–13)
NEUTS SEG # BLD: 10.9 K/UL (ref 1.8–8)
NEUTS SEG NFR BLD: 87 % (ref 32–75)
PHOSPHATE SERPL-MCNC: 2 MG/DL (ref 2.6–4.7)
PLATELET # BLD AUTO: 147 K/UL (ref 150–400)
POTASSIUM SERPL-SCNC: 3.5 MMOL/L (ref 3.5–5.1)
PROT SERPL-MCNC: 6 G/DL (ref 6.4–8.2)
RBC # BLD AUTO: 4.21 M/UL (ref 4.1–5.7)
SERVICE CMNT-IMP: NORMAL
SODIUM SERPL-SCNC: 143 MMOL/L (ref 136–145)
WBC # BLD AUTO: 12.5 K/UL (ref 4.1–11.1)

## 2017-10-14 PROCEDURE — 74011000250 HC RX REV CODE- 250: Performed by: GENERAL ACUTE CARE HOSPITAL

## 2017-10-14 PROCEDURE — 83735 ASSAY OF MAGNESIUM: CPT | Performed by: HOSPITALIST

## 2017-10-14 PROCEDURE — 84100 ASSAY OF PHOSPHORUS: CPT | Performed by: HOSPITALIST

## 2017-10-14 PROCEDURE — 36415 COLL VENOUS BLD VENIPUNCTURE: CPT | Performed by: HOSPITALIST

## 2017-10-14 PROCEDURE — 83690 ASSAY OF LIPASE: CPT | Performed by: HOSPITALIST

## 2017-10-14 PROCEDURE — 82962 GLUCOSE BLOOD TEST: CPT

## 2017-10-14 PROCEDURE — 80053 COMPREHEN METABOLIC PANEL: CPT | Performed by: HOSPITALIST

## 2017-10-14 PROCEDURE — 85025 COMPLETE CBC W/AUTO DIFF WBC: CPT | Performed by: HOSPITALIST

## 2017-10-14 PROCEDURE — 65270000029 HC RM PRIVATE

## 2017-10-14 PROCEDURE — 74011250636 HC RX REV CODE- 250/636: Performed by: GENERAL ACUTE CARE HOSPITAL

## 2017-10-14 PROCEDURE — 74011250636 HC RX REV CODE- 250/636: Performed by: HOSPITALIST

## 2017-10-14 RX ADMIN — Medication 10 ML: at 09:06

## 2017-10-14 RX ADMIN — SODIUM CHLORIDE, SODIUM LACTATE, POTASSIUM CHLORIDE, AND CALCIUM CHLORIDE 150 ML/HR: 600; 310; 30; 20 INJECTION, SOLUTION INTRAVENOUS at 05:20

## 2017-10-14 RX ADMIN — SODIUM PHOSPHATE, MONOBASIC, MONOHYDRATE AND SODIUM PHOSPHATE, DIBASIC ANHYDROUS: 276; 142 INJECTION, SOLUTION INTRAVENOUS at 09:02

## 2017-10-14 RX ADMIN — SODIUM CHLORIDE, SODIUM LACTATE, POTASSIUM CHLORIDE, AND CALCIUM CHLORIDE 150 ML/HR: 600; 310; 30; 20 INJECTION, SOLUTION INTRAVENOUS at 19:40

## 2017-10-14 NOTE — PROGRESS NOTES
General Surgery End of Shift Nursing Note    Bedside shift change report given to Mode Simons RN (oncoming nurse) by Edgar Dowling RN (offgoing nurse). Report included the following information SBAR, Kardex, Intake/Output, MAR and Accordion. Shift worked:   7p-7a   Significant changes during shift:    GI Consult called; Dr. Deann Huerta to see patient this am   Non-emergent issues for physician to address:        Number times ambulated in hallway past shift: 0    Number of times OOB to chair past shift: 0    Pain Management:  Current medication:   Patient states pain is manageable on current pain medication: YES    GI:    Current diet:  DIET NPO    Tolerating current diet: YES  Passing flatus: YES  Last Bowel Movement: yesterday   Appearance:     Respiratory:    Incentive Spirometer at bedside: YES  Patient instructed on use: YES    Patient Safety:    Falls Score: 3  Bed Alarm On? Yes  Sitter?  No    Edgar Dowling RN

## 2017-10-14 NOTE — CONSULTS
GI Consultation Note Scott Aubrto)    NAME: Ceferino Smyth : 1935 MRN: [id number]   ATTG: Mirtha Hill MD PCP: Uma Washington MD  Date/Time:  10/14/2017 10:48 AM  Subjective:   REASON FOR CONSULT:      Alex Mcleod is a 80 y.o.  male who I was asked to see for evaluation of acute pancreatitis with elevated LFTs and noted GS on abnl GI radiographs. He was admitted via ER yesterday PM on presentation with new onset crampy b/l lower abdominal pain, beginning a few hours after dinner, associated with diarrhea and chills. The pain improved following defecation and when pain recurred in ER was relieved with Morphine IV. He denies associated GI bleeding, F/C, new medications, sick exposures, or use of EtOH. ER eval noted lipase >3000, , , T. Bili 2.1, with no bilirubin noted in urine. Today lipase is trending down to 2271, with improved transaminases, but T.Bili 2.6.  US noted GS, but no DD, GBWT, or fluid. Abd MRI/MRCP preliminary review notes GS, but does not show cholecystitis, pancreatitis, CBD obstruction or filling defect. At present, he notes resolution of his abdominal pain. He is noted to have been evaluated by Barbara Hayden in  with noted GS then and sx suggestive of biliary colic prompting plan for cholecystectomy which pt did not follow through on as his sx improved.      Past Medical History:   Diagnosis Date    Allergic rhinitis     Arthritis of left knee     Elevated alkaline phosphatase level     Essential hypertension, benign     Gallstone     Gout     Hearing loss     Musculoskeletal disorder       Past Surgical History:   Procedure Laterality Date    HX HEENT  2014    cataract    HX HEENT  2014    cataract    HX ORTHOPAEDIC      left knee cartilage repair    HX OTHER SURGICAL      surgery on lungs as baby     Social History   Substance Use Topics    Smoking status: Never Smoker    Smokeless tobacco: Never Used    Alcohol use No      Family History   Problem Relation Age of Onset    Cancer Mother     Alzheimer Father       No Known Allergies   Home Medications:  Prior to Admission Medications   Prescriptions Last Dose Informant Patient Reported? Taking? albuterol (PROVENTIL VENTOLIN) 2.5 mg /3 mL (0.083 %) nebulizer solution 10/12/2017 at Unknown time Transfer Papers Yes Yes   Si.5 mg by Nebulization route every six (6) hours as needed for Wheezing. 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 amLODIPine (NORVASC) 5 mg tablet 10/12/2017 at Unknown time Transfer Papers Yes Yes   Sig: Take 5 mg by mouth daily. cholecalciferol (VITAMIN D3) 1,000 unit cap 10/12/2017 at Unknown time Transfer Papers Yes Yes   Sig: Take 1,000 Units by mouth daily. diclofenac (VOLTAREN) 1 % gel Unknown at Unknown time Transfer Papers Yes No   Sig: Apply  to affected area two (2) times daily as needed. Apply a thin layer to the foot/knee two (2) times daily as needed for pain   fluticasone (FLONASE) 50 mcg/actuation nasal spray Unknown at Unknown time Transfer Papers Yes No   Si [place] by Both Nostrils route daily as needed for Rhinitis or Allergies. guaiFENesin ER (MUCINEX) 600 mg ER tablet Unknown at Unknown time Other Yes No   Sig: Take 600 mg by mouth daily as needed for Congestion. ibuprofen (MOTRIN) 600 mg tablet 10/12/2017 at Unknown time Transfer Papers Yes Yes   Sig: Take 600 mg by mouth two (2) times daily as needed for Pain (Knee pain). lisinopril (PRINIVIL, ZESTRIL) 20 mg tablet 10/12/2017 at Unknown time Transfer Papers Yes Yes   Sig: Take 20 mg by mouth daily. loratadine (CLARITIN) 10 mg tablet 10/12/2017 at Unknown time Transfer Papers Yes Yes   Sig: Take 10 mg by mouth daily.    montelukast (SINGULAIR) 10 mg tablet 10/12/2017 at Unknown time Transfer Papers Yes Yes   Sig: Take 10 mg by mouth daily.       Facility-Administered Medications: None     Hospital medications:  Current Facility-Administered Medications   Medication Dose Route Frequency    sodium phosphate 15 mmol in 0.9% sodium chloride 500 mL infusion   IntraVENous ONCE    lactated Ringers infusion  150 mL/hr IntraVENous CONTINUOUS    HYDROmorphone (DILAUDID) injection 1 mg  1 mg IntraVENous Q4H PRN    sodium chloride (NS) flush 5-10 mL  5-10 mL IntraVENous Q8H    sodium chloride (NS) flush 5-10 mL  5-10 mL IntraVENous PRN    acetaminophen (TYLENOL) tablet 650 mg  650 mg Oral Q6H PRN    ondansetron (ZOFRAN ODT) tablet 4 mg  4 mg Oral Q6H PRN    enoxaparin (LOVENOX) injection 40 mg  40 mg SubCUTAneous Q24H    insulin lispro (HUMALOG) injection   SubCUTAneous Q6H    glucose chewable tablet 16 g  4 Tab Oral PRN    dextrose (D50W) injection syrg 12.5-25 g  12.5-25 g IntraVENous PRN    glucagon (GLUCAGEN) injection 1 mg  1 mg IntraMUSCular PRN    hydrALAZINE (APRESOLINE) 20 mg/mL injection 10 mg  10 mg IntraVENous Q6H PRN    albuterol (PROVENTIL VENTOLIN) nebulizer solution 2.5 mg  2.5 mg Nebulization Q6H PRN    cholecalciferol (VITAMIN D3) tablet 1,000 Units  1,000 Units Oral DAILY    influenza vaccine 2017-18 (3 yrs+)(PF) (FLUZONE QUAD/FLUARIX QUAD) injection 0.5 mL  0.5 mL IntraMUSCular PRIOR TO DISCHARGE     REVIEW OF SYSTEMS:     [x]    Total of 11 systems reviewed as follows:  Const:   negative fever, negative weight loss  Eyes:   negative diplopia or visual changes, negative eye pain  ENT:   negative coryza, negative sore throat  Resp:   negative cough, hemoptysis, dyspnea  Cards:  negative for chest pain, palpitations, lower extremity edema  :  negative for frequency, dysuria and hematuria  Skin:   negative for rash and pruritus  Heme:   negative for easy bruising and gum/nose bleeding  MS:  negative for myalgias, arthralgias, back pain and muscle weakness  Neurolo:  negative for headaches, dizziness, vertigo, memory problems   Psych:  negative for feelings of anxiety, depression     Pertinent Positives include :    Objective:   VITALS:    Visit Vitals    /64    Pulse 73    Temp 98 °F (36.7 °C)    Resp 18    Ht 5' 3\" (1.6 m)    Wt 74.8 kg (165 lb)    SpO2 95%    BMI 29.23 kg/m2     Temp (24hrs), Av.3 °F (36.8 °C), Min:97.7 °F (36.5 °C), Max:99.2 °F (37.3 °C)    PHYSICAL EXAM:   General:    Alert, cooperative, no distress, appears stated age. Head:   Normocephalic, without obvious abnormality, atraumatic. Eyes:   Conjunctivae clear, anicteric sclerae. Pupils are equal  Nose:  Nares normal. No drainage or sinus tenderness. Throat:    Lips, mucosa, and tongue normal.  No Thrush  Neck:  Supple, symmetrical,  no adenopathy, thyroid: non tender  Back:    Symmetric,  No CVA tenderness. Lungs:   CTA bilaterally. No wheezing/rhonchi/rales. Chest wall:  No tenderness or deformity. No Accessory muscle use. Heart:   Regular rate and rhythm,  no murmur, rub or gallop. Abdomen:   Soft, non-tender. Not distended. Bowel sounds normal. No masses  Extremities: Atraumatic, No cyanosis. No edema. No clubbing  Skin:     Texture, turgor normal. No rashes/lesions/jaundice  Lymph: Cervical, supraclavicular normal.  Psych:  Good insight. Not depressed. Not anxious or agitated. Neurologic: EOMs intact. Normal  strength, A/O X 3.      LAB DATA REVIEWED:    Recent Results (from the past 48 hour(s))   CBC WITH AUTOMATED DIFF    Collection Time: 10/13/17  5:55 AM   Result Value Ref Range    WBC 13.0 (H) 4.1 - 11.1 K/uL    RBC 4.84 4.10 - 5.70 M/uL    HGB 14.6 12.1 - 17.0 g/dL    HCT 42.9 36.6 - 50.3 %    MCV 88.6 80.0 - 99.0 FL    MCH 30.2 26.0 - 34.0 PG    MCHC 34.0 30.0 - 36.5 g/dL    RDW 12.9 11.5 - 14.5 %    PLATELET 921 034 - 846 K/uL    NEUTROPHILS 92 %    BAND NEUTROPHILS 5 %    LYMPHOCYTES 3 %    MONOCYTES 0 % EOSINOPHILS 0 %    BASOPHILS 0 %    ABS. NEUTROPHILS 12.6 K/UL    ABS. LYMPHOCYTES 0.4 K/UL    ABS. MONOCYTES 0.0 K/UL    ABS. EOSINOPHILS 0.0 K/UL    ABS. BASOPHILS 0.0 K/UL    RBC COMMENTS NORMOCYTIC, NORMOCHROMIC      DF MANUAL     METABOLIC PANEL, COMPREHENSIVE    Collection Time: 10/13/17  5:55 AM   Result Value Ref Range    Sodium 140 136 - 145 mmol/L    Potassium 3.9 3.5 - 5.1 mmol/L    Chloride 108 97 - 108 mmol/L    CO2 24 21 - 32 mmol/L    Anion gap 8 5 - 15 mmol/L    Glucose 118 (H) 65 - 100 mg/dL    BUN 20 6 - 20 MG/DL    Creatinine 1.23 0.70 - 1.30 MG/DL    BUN/Creatinine ratio 16 12 - 20      GFR est AA >60 >60 ml/min/1.73m2    GFR est non-AA 56 (L) >60 ml/min/1.73m2    Calcium 9.1 8.5 - 10.1 MG/DL    Bilirubin, total 2.1 (H) 0.2 - 1.0 MG/DL    ALT (SGPT) 425 (H) 12 - 78 U/L    AST (SGOT) 672 (H) 15 - 37 U/L    Alk.  phosphatase 184 (H) 45 - 117 U/L    Protein, total 6.8 6.4 - 8.2 g/dL    Albumin 3.6 3.5 - 5.0 g/dL    Globulin 3.2 2.0 - 4.0 g/dL    A-G Ratio 1.1 1.1 - 2.2     LIPASE    Collection Time: 10/13/17  5:55 AM   Result Value Ref Range    Lipase >3000 (H) 73 - 393 U/L   LACTIC ACID    Collection Time: 10/13/17  5:55 AM   Result Value Ref Range    Lactic acid 2.0 0.4 - 2.0 MMOL/L   URINALYSIS W/ REFLEX CULTURE    Collection Time: 10/13/17  6:12 AM   Result Value Ref Range    Color DARK YELLOW      Appearance CLEAR CLEAR      Specific gravity 1.015 1.003 - 1.030      pH (UA) 5.5 5.0 - 8.0      Protein NEGATIVE  NEG mg/dL    Glucose NEGATIVE  NEG mg/dL    Ketone NEGATIVE  NEG mg/dL    Bilirubin NEGATIVE  NEG      Blood NEGATIVE  NEG      Urobilinogen 2.0 (H) 0.2 - 1.0 EU/dL    Nitrites NEGATIVE  NEG      Leukocyte Esterase NEGATIVE  NEG      WBC 0-4 0 - 4 /hpf    RBC 0-5 0 - 5 /hpf    Epithelial cells FEW FEW /lpf    Bacteria NEGATIVE  NEG /hpf    UA:UC IF INDICATED CULTURE NOT INDICATED BY UA RESULT CNI      Hyaline cast 0-2 0 - 5 /lpf   GLUCOSE, POC    Collection Time: 10/13/17  7:03 PM   Result Value Ref Range    Glucose (POC) 105 (H) 65 - 100 mg/dL    Performed by Adonay Raphael    GLUCOSE, POC    Collection Time: 10/13/17 11:31 PM   Result Value Ref Range    Glucose (POC) 94 65 - 100 mg/dL    Performed by Estephania Ivy    GLUCOSE, POC    Collection Time: 10/14/17  5:34 AM   Result Value Ref Range    Glucose (POC) 94 65 - 100 mg/dL    Performed by LEE EDEN    CBC WITH AUTOMATED DIFF    Collection Time: 10/14/17  5:38 AM   Result Value Ref Range    WBC 12.5 (H) 4.1 - 11.1 K/uL    RBC 4.21 4.10 - 5.70 M/uL    HGB 13.2 12.1 - 17.0 g/dL    HCT 37.9 36.6 - 50.3 %    MCV 90.0 80.0 - 99.0 FL    MCH 31.4 26.0 - 34.0 PG    MCHC 34.8 30.0 - 36.5 g/dL    RDW 13.3 11.5 - 14.5 %    PLATELET 264 (L) 772 - 400 K/uL    NEUTROPHILS 87 (H) 32 - 75 %    LYMPHOCYTES 7 (L) 12 - 49 %    MONOCYTES 6 5 - 13 %    EOSINOPHILS 0 0 - 7 %    BASOPHILS 0 0 - 1 %    ABS. NEUTROPHILS 10.9 (H) 1.8 - 8.0 K/UL    ABS. LYMPHOCYTES 0.8 0.8 - 3.5 K/UL    ABS. MONOCYTES 0.7 0.0 - 1.0 K/UL    ABS. EOSINOPHILS 0.1 0.0 - 0.4 K/UL    ABS.  BASOPHILS 0.0 0.0 - 0.1 K/UL   MAGNESIUM    Collection Time: 10/14/17  5:38 AM   Result Value Ref Range    Magnesium 1.8 1.6 - 2.4 mg/dL   PHOSPHORUS    Collection Time: 10/14/17  5:38 AM   Result Value Ref Range    Phosphorus 2.0 (L) 2.6 - 4.7 MG/DL   LIPASE    Collection Time: 10/14/17  5:38 AM   Result Value Ref Range    Lipase 2271 (H) 73 - 424 U/L   METABOLIC PANEL, COMPREHENSIVE    Collection Time: 10/14/17  5:38 AM   Result Value Ref Range    Sodium 143 136 - 145 mmol/L    Potassium 3.5 3.5 - 5.1 mmol/L    Chloride 111 (H) 97 - 108 mmol/L    CO2 23 21 - 32 mmol/L    Anion gap 9 5 - 15 mmol/L    Glucose 91 65 - 100 mg/dL    BUN 15 6 - 20 MG/DL    Creatinine 1.07 0.70 - 1.30 MG/DL    BUN/Creatinine ratio 14 12 - 20      GFR est AA >60 >60 ml/min/1.73m2    GFR est non-AA >60 >60 ml/min/1.73m2    Calcium 8.7 8.5 - 10.1 MG/DL    Bilirubin, total 2.6 (H) 0.2 - 1.0 MG/DL    ALT (SGPT) 329 (H) 12 - 78 U/L AST (SGOT) 190 (H) 15 - 37 U/L    Alk. phosphatase 156 (H) 45 - 117 U/L    Protein, total 6.0 (L) 6.4 - 8.2 g/dL    Albumin 2.9 (L) 3.5 - 5.0 g/dL    Globulin 3.1 2.0 - 4.0 g/dL    A-G Ratio 0.9 (L) 1.1 - 2.2       IMAGING RESULTS:   [x]      I have personally reviewed the actual   [x]    MRCP  []    CT  [x]     US  MRCP 10/13/17 (PRELIMINARY REPORT):  Findings:   Common duct is normal in caliber. No stones within the duct  Multiple stones within the gallbladder. No pericholecystic fluid  Pancreatic duct is not dilated. No peripancreatic fluid collections or  significant edema  Final report to follow. ABD US 10/13/17  Findings:  Real-time sonographic imaging of the right upper quadrant was performed. The  liver is normal in appearance without evidence of mass lesion or biliary  dilatation. Gallstones are noted. There is no gallbladder wall thickening or  sonographic Mckeon's sign. Common bile duct is normal in size. The right kidney  is normal in size and appearance without evidence of mass lesion,  hydronephrosis, or calcification. The pancreas is not visualized due to bowel  gas. No free fluid. IMPRESSION: Cholelithiasis without evidence to suggest acute cholecystitis.     Recommendations/Plan:      Active Problems:    Acute pancreatitis (10/13/2017)       ___________________________________________________  RECOMMENDATIONS:    81yo M with GS pancreatitis associated with elevated liver enzymes and abdominal pain. The lab testing and improvement of sx suggests stone  May have passed. His radiographs are abnl only for GS.   Plan:  1) IVF  2) IV narcotic analgesia PRN  3) IV antiemetics  4) Check final MRI report  5) Keep NPO for now  6) As sx and labs continue to improve, would get surgical consultation with Arnulfo Brock on Monday to consider cholecystectomy prior to discharge home    Discussed Code Status:    [x]    Full Code      []    DNR    ___________________________________________________  Care Plan discussed with:    [x]    Patient   []    Family   [x]    Nursing   []    Attending  Total Time :  50   minutes   ___________________________________________________  GI: Demond Denson MD

## 2017-10-14 NOTE — PROGRESS NOTES
8:11 PM Spoke with Dr. Elvis Franco regarding GI consult; reviewed patient's labs with MD; Dr. Elvis Franco will see patient in am; no new orders received    Melissa Porter RN  10/13/17  8:11 PM

## 2017-10-14 NOTE — PROGRESS NOTES
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 No stones within the duct. Multiple stones within the gallbladder. No pericholecystic fluid. Pancreatic duct is not dilated. No peripancreatic fluid collections or significant edema.  -Pain meds prn  -GI consult pending  -Continue holding BP meds, IV hydralazine prn  -Continue Vitamin D  -WBC improved to 12.5 from 13  -LFTs remain deranged  -Will order AM labs  -Continue check stool culture, c. Diff, ova and parasite if diarrhea recurs    Body mass index is 29.23 kg/(m^2). Code status: Full  Prophylaxis: SCD's - switched from Lovenox  Recommended Disposition: Home w/Family     Subjective:     Chief Complaint / Reason for Physician Visit  \"I am feeling better\". Discussed with RN events overnight. Review of Systems:  Symptom Y/N Comments  Symptom Y/N Comments   Fever/Chills n   Chest Pain n    Poor Appetite n   Edema     Cough    Abdominal Pain n    Sputum    Joint Pain     SOB/ADRIAN n   Pruritis/Rash     Nausea/vomit n   Tolerating PT/OT y    Diarrhea    Tolerating Diet     Constipation    Other       Could NOT obtain due to:      Objective:     VITALS:   Last 24hrs VS reviewed since prior progress note.  Most recent are:  Patient Vitals for the past 24 hrs:   Temp Pulse Resp BP SpO2   10/14/17 0520 98 °F (36.7 °C) 73 18 162/64 95 %   10/13/17 2323 99.2 °F (37.3 °C) 75 18 142/46 95 %   10/13/17 2011 98.5 °F (36.9 °C) 72 18 173/65 96 %   10/13/17 1443 98.2 °F (36.8 °C) 70 16 140/75 96 %   10/13/17 1403 97.7 °F (36.5 °C) 75 16 159/79 96 %   10/13/17 1315 - - - 133/64 94 %   10/13/17 1300 - - - 145/66 94 %   10/13/17 1245 - - - 137/66 94 %   10/13/17 1230 - - - 136/66 93 %   10/13/17 1215 - - - 136/65 93 %   10/13/17 1200 - - - 142/68 94 %   10/13/17 1145 - - - 135/64 94 %   10/13/17 1130 - - - 138/67 93 %   10/13/17 1115 - - - 135/63 93 %   10/13/17 1100 - - - 138/63 93 %       Intake/Output Summary (Last 24 hours) at 10/14/17 1054  Last data filed at 10/14/17 0520   Gross per 24 hour   Intake             2370 ml   Output              400 ml   Net             1970 ml        PHYSICAL EXAM:  General: WD, WN. Alert, cooperative, no acute distress    EENT:  EOMI. Anicteric sclerae. MMM  Resp:  CTA bilaterally, no wheezing or rales. No accessory muscle use  CV:  Regular  rhythm,  No edema  GI:  Soft, non distended, no tenderness to deep palpation, no rebound tenderness, BS+  Neurologic:  Alert and oriented X 3, normal speech,  No focal deficits  Psych:   Good insight. Not anxious nor agitated  Skin:  No rashes. No jaundice    Reviewed most current lab test results and cultures  YES  Reviewed most current radiology test results   YES  Review and summation of old records today    NO  Reviewed patient's current orders and MAR    YES  PMH/SH reviewed - no change compared to H&P  ________________________________________________________________________  Care Plan discussed with:    Comments   Patient y    Family      RN y    Care Manager     Consultant                        Multidiciplinary team rounds were held today with , nursing, pharmacist and clinical coordinator. Patient's plan of care was discussed; medications were reviewed and discharge planning was addressed.      ________________________________________________________________________  Total NON critical care TIME:  35   Minutes    Total CRITICAL CARE TIME Spent:   Minutes non procedure based      Comments   >50% of visit spent in counseling and coordination of care ________________________________________________________________________  Luis A Patton MD     Procedures: see electronic medical records for all procedures/Xrays and details which were not copied into this note but were reviewed prior to creation of Plan. LABS:  I reviewed today's most current labs and imaging studies.   Pertinent labs include:  Recent Labs      10/14/17   0538  10/13/17   0555   WBC  12.5*  13.0*   HGB  13.2  14.6   HCT  37.9  42.9   PLT  147*  158     Recent Labs      10/14/17   0538  10/13/17   0555   NA  143  140   K  3.5  3.9   CL  111*  108   CO2  23  24   GLU  91  118*   BUN  15  20   CREA  1.07  1.23   CA  8.7  9.1   MG  1.8   --    PHOS  2.0*   --    ALB  2.9*  3.6   TBILI  2.6*  2.1*   SGOT  190*  672*   ALT  329*  425*       Signed: Luis A Patton MD

## 2017-10-15 ENCOUNTER — ANESTHESIA EVENT (OUTPATIENT)
Dept: SURGERY | Age: 82
DRG: 418 | End: 2017-10-15
Payer: MEDICARE

## 2017-10-15 LAB
ALBUMIN SERPL-MCNC: 3 G/DL (ref 3.5–5)
ALBUMIN/GLOB SERPL: 0.9 {RATIO} (ref 1.1–2.2)
ALP SERPL-CCNC: 163 U/L (ref 45–117)
ALT SERPL-CCNC: 212 U/L (ref 12–78)
ANION GAP SERPL CALC-SCNC: 9 MMOL/L (ref 5–15)
AST SERPL-CCNC: 83 U/L (ref 15–37)
BASOPHILS # BLD: 0 K/UL (ref 0–0.1)
BASOPHILS NFR BLD: 0 % (ref 0–1)
BILIRUB SERPL-MCNC: 1.7 MG/DL (ref 0.2–1)
BUN SERPL-MCNC: 16 MG/DL (ref 6–20)
BUN/CREAT SERPL: 14 (ref 12–20)
CALCIUM SERPL-MCNC: 8.8 MG/DL (ref 8.5–10.1)
CHLORIDE SERPL-SCNC: 107 MMOL/L (ref 97–108)
CO2 SERPL-SCNC: 23 MMOL/L (ref 21–32)
CREAT SERPL-MCNC: 1.13 MG/DL (ref 0.7–1.3)
DIFFERENTIAL METHOD BLD: ABNORMAL
EOSINOPHIL # BLD: 0.1 K/UL (ref 0–0.4)
EOSINOPHIL NFR BLD: 1 % (ref 0–7)
ERYTHROCYTE [DISTWIDTH] IN BLOOD BY AUTOMATED COUNT: 12.9 % (ref 11.5–14.5)
GLOBULIN SER CALC-MCNC: 3.2 G/DL (ref 2–4)
GLUCOSE BLD STRIP.AUTO-MCNC: 149 MG/DL (ref 65–100)
GLUCOSE BLD STRIP.AUTO-MCNC: 87 MG/DL (ref 65–100)
GLUCOSE BLD STRIP.AUTO-MCNC: 90 MG/DL (ref 65–100)
GLUCOSE SERPL-MCNC: 90 MG/DL (ref 65–100)
HCT VFR BLD AUTO: 37.2 % (ref 36.6–50.3)
HGB BLD-MCNC: 12.9 G/DL (ref 12.1–17)
LIPASE SERPL-CCNC: 383 U/L (ref 73–393)
LYMPHOCYTES # BLD: 0.6 K/UL (ref 0.8–3.5)
LYMPHOCYTES NFR BLD: 8 % (ref 12–49)
MAGNESIUM SERPL-MCNC: 1.8 MG/DL (ref 1.6–2.4)
MCH RBC QN AUTO: 30.9 PG (ref 26–34)
MCHC RBC AUTO-ENTMCNC: 34.7 G/DL (ref 30–36.5)
MCV RBC AUTO: 89 FL (ref 80–99)
MONOCYTES # BLD: 0.5 K/UL (ref 0–1)
MONOCYTES NFR BLD: 6 % (ref 5–13)
NEUTS SEG # BLD: 6.8 K/UL (ref 1.8–8)
NEUTS SEG NFR BLD: 85 % (ref 32–75)
PHOSPHATE SERPL-MCNC: 2.1 MG/DL (ref 2.6–4.7)
PLATELET # BLD AUTO: 149 K/UL (ref 150–400)
POTASSIUM SERPL-SCNC: 3.5 MMOL/L (ref 3.5–5.1)
PROT SERPL-MCNC: 6.2 G/DL (ref 6.4–8.2)
RBC # BLD AUTO: 4.18 M/UL (ref 4.1–5.7)
RBC MORPH BLD: ABNORMAL
SERVICE CMNT-IMP: ABNORMAL
SERVICE CMNT-IMP: NORMAL
SERVICE CMNT-IMP: NORMAL
SODIUM SERPL-SCNC: 139 MMOL/L (ref 136–145)
WBC # BLD AUTO: 8 K/UL (ref 4.1–11.1)

## 2017-10-15 PROCEDURE — 36415 COLL VENOUS BLD VENIPUNCTURE: CPT | Performed by: GENERAL ACUTE CARE HOSPITAL

## 2017-10-15 PROCEDURE — 65270000029 HC RM PRIVATE

## 2017-10-15 PROCEDURE — 80053 COMPREHEN METABOLIC PANEL: CPT | Performed by: GENERAL ACUTE CARE HOSPITAL

## 2017-10-15 PROCEDURE — 74011000250 HC RX REV CODE- 250: Performed by: GENERAL ACUTE CARE HOSPITAL

## 2017-10-15 PROCEDURE — 83690 ASSAY OF LIPASE: CPT | Performed by: GENERAL ACUTE CARE HOSPITAL

## 2017-10-15 PROCEDURE — 84100 ASSAY OF PHOSPHORUS: CPT | Performed by: GENERAL ACUTE CARE HOSPITAL

## 2017-10-15 PROCEDURE — 74011250636 HC RX REV CODE- 250/636: Performed by: HOSPITALIST

## 2017-10-15 PROCEDURE — 85025 COMPLETE CBC W/AUTO DIFF WBC: CPT | Performed by: GENERAL ACUTE CARE HOSPITAL

## 2017-10-15 PROCEDURE — 82962 GLUCOSE BLOOD TEST: CPT

## 2017-10-15 PROCEDURE — 74011250636 HC RX REV CODE- 250/636: Performed by: GENERAL ACUTE CARE HOSPITAL

## 2017-10-15 PROCEDURE — 83735 ASSAY OF MAGNESIUM: CPT | Performed by: GENERAL ACUTE CARE HOSPITAL

## 2017-10-15 RX ADMIN — SODIUM CHLORIDE, SODIUM LACTATE, POTASSIUM CHLORIDE, AND CALCIUM CHLORIDE 150 ML/HR: 600; 310; 30; 20 INJECTION, SOLUTION INTRAVENOUS at 22:38

## 2017-10-15 RX ADMIN — SODIUM PHOSPHATE, MONOBASIC, MONOHYDRATE AND SODIUM PHOSPHATE, DIBASIC ANHYDROUS: 276; 142 INJECTION, SOLUTION INTRAVENOUS at 08:05

## 2017-10-15 RX ADMIN — Medication 10 ML: at 11:57

## 2017-10-15 RX ADMIN — SODIUM CHLORIDE, SODIUM LACTATE, POTASSIUM CHLORIDE, AND CALCIUM CHLORIDE 150 ML/HR: 600; 310; 30; 20 INJECTION, SOLUTION INTRAVENOUS at 03:19

## 2017-10-15 RX ADMIN — Medication 10 ML: at 22:40

## 2017-10-15 NOTE — PROGRESS NOTES
9:45 PM On call MD paged regarding patient heart rate maintaining in 40s and dropping to 30s; patient is asymptomatic; patient did have a pause of 2.03 seconds around 21:35; awaiting call back    9:53 PM Spoke with Dr. Jenny Waters about patient's HR; no new orders received; will continue to monitor and intervene as needed.     Netta Gruber RN  10/14/17  9:54 PM

## 2017-10-15 NOTE — PROGRESS NOTES
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 No stones within the duct. Multiple stones within the gallbladder. No pericholecystic fluid. Pancreatic duct is not dilated. No peripancreatic fluid collections or significant edema - still pending final results  -Pain meds prn  -GI input appreciated  -Continue holding BP meds, IV hydralazine prn  -Continue Vitamin D  -WBC improved to 8.0 from 13  -LFTs remain deranged but improving,  to 212,  to 83, Alk Phos 184 to 163  -Continue check stool culture, c. Diff, ova and parasite if diarrhea recurs  -Repeat labs tmr  -Will consult Surgery for possible samir tomorrow    Hypophosphatemia  -Phos 2.1, repleted, monitor    Body mass index is 29.23 kg/(m^2).     Code status: Full  Prophylaxis: SCD's - switched from Lovenox  Recommended Disposition: Home w/Family     Subjective:     Chief Complaint / Reason for Physician Visit  \"I feel good\". Discussed with RN events overnight. Review of Systems:  Symptom Y/N Comments  Symptom Y/N Comments   Fever/Chills n   Chest Pain n    Poor Appetite n   Edema     Cough    Abdominal Pain n    Sputum    Joint Pain     SOB/ADRIAN    Pruritis/Rash     Nausea/vomit n   Tolerating PT/OT y    Diarrhea    Tolerating Diet y    Constipation    Other       Could NOT obtain due to:      Objective:     VITALS:   Last 24hrs VS reviewed since prior progress note.  Most recent are:  Patient Vitals for the past 24 hrs:   Temp Pulse Resp BP SpO2   10/15/17 0758 98.1 °F (36.7 °C) (!) 47 16 166/65 96 %   10/15/17 0319 97.9 °F (36.6 °C) (!) 57 18 180/61 97 % 10/14/17 2328 98.2 °F (36.8 °C) (!) 57 18 166/58 95 %   10/14/17 1931 97.1 °F (36.2 °C) (!) 58 18 170/64 97 %   10/14/17 1720 98.2 °F (36.8 °C) (!) 58 20 175/66 94 %   10/14/17 1540 98.7 °F (37.1 °C) 61 21 164/65 95 %   10/14/17 1148 98 °F (36.7 °C) 62 18 147/66 93 %       Intake/Output Summary (Last 24 hours) at 10/15/17 1002  Last data filed at 10/15/17 0319   Gross per 24 hour   Intake           3297.5 ml   Output                0 ml   Net           3297.5 ml        PHYSICAL EXAM:  General:                    WD, WN. Alert, cooperative, no acute distress    EENT:                       EOMI. Anicteric sclerae. MMM  Resp:                        CTA bilaterally, no wheezing or rales. No accessory muscle use  CV:                            Regular  rhythm,  No edema  GI:                              Soft, non distended, no tenderness to deep palpation, no rebound tenderness, BS+, negative Mckeon's  Neurologic:                Alert and oriented X 3, normal speech,  No focal deficits  Psych:                       Good insight. Not anxious nor agitated  Skin:                           No rashes. No jaundice    Reviewed most current lab test results and cultures  YES  Reviewed most current radiology test results   YES  Review and summation of old records today    NO  Reviewed patient's current orders and MAR    YES  PMH/ reviewed - no change compared to H&P  ________________________________________________________________________  Care Plan discussed with:    Comments   Patient y    [facility]                        Multidiciplinary team rounds were held today with , nursing, pharmacist and clinical coordinator. Patient's plan of care was discussed; medications were reviewed and discharge planning was addressed.      ________________________________________________________________________  Total NON critical care TIME:  30   Minutes    Total CRITICAL CARE TIME Spent: Minutes non procedure based      Comments   >50% of visit spent in counseling and coordination of care     ________________________________________________________________________  Lydia Norris MD     Procedures: see electronic medical records for all procedures/Xrays and details which were not copied into this note but were reviewed prior to creation of Plan. LABS:  I reviewed today's most current labs and imaging studies.   Pertinent labs include:  Recent Labs      10/15/17   0329  10/14/17   0538  10/13/17   0555   WBC  8.0  12.5*  13.0*   HGB  12.9  13.2  14.6   HCT  37.2  37.9  42.9   PLT  149*  147*  158     Recent Labs      10/15/17   0329  10/14/17   0538  10/13/17   0555   NA  139  143  140   K  3.5  3.5  3.9   CL  107  111*  108   CO2  23  23  24   GLU  90  91  118*   BUN  16  15  20   CREA  1.13  1.07  1.23   CA  8.8  8.7  9.1   MG  1.8  1.8   --    PHOS  2.1*  2.0*   --    ALB  3.0*  2.9*  3.6   TBILI  1.7*  2.6*  2.1*   SGOT  83*  190*  672*   ALT  212*  329*  425*       Signed: Lydia Norris MD

## 2017-10-15 NOTE — PROGRESS NOTES
General Surgery End of Shift Nursing Note    Bedside shift change report given to Sherin Terry RN (oncoming nurse) by Jessica Parker RN (offgoing nurse). Report included the following information SBAR, Kardex, Intake/Output, MAR, Accordion and Recent Results. Shift worked:   7p-7a   Significant changes during shift:    Patient with 2 BMs overnight that he reports are \"normal\" and he denies diarrhea; labs show significant improvement; patient fay's down to 30s while asleep   Non-emergent issues for physician to address:        Number times ambulated in hallway past shift: 0    Number of times OOB to chair past shift: 1    Pain Management:  Current medication: None  Patient states pain is manageable on current pain medication: YES    GI:    Current diet:  DIET NPO    Tolerating current diet: YES  Passing flatus: YES  Last Bowel Movement: today   Appearance: \"normal\" per patient, denies diarrhea    Respiratory:    Incentive Spirometer at bedside: YES  Patient instructed on use: YES    Patient Safety:    Falls Score: 3  Bed Alarm On? Yes  Sitter?  No    Jessica Parker RN

## 2017-10-15 NOTE — CONSULTS
Surgery      79 yo male admitted with gallstone pancreatitis who has seen Dr Tolu García in the past for cholelithiasis. Review of chart shows plan was to consult Dr Tolu García for Lap grazyna IOC tomorrow. Dr Jenny Traylor asked me to see the patient today. US showed:\"Impression: Cholelithiasis without evidence to suggest acute cholecystitis. \"    MRI:\"Common duct is normal in caliber. No stones within the duct     Multiple stones within the gallbladder. No pericholecystic fluid     Pancreatic duct is not dilated. No peripancreatic fluid collections or  significant edema    Lipase has gone from >3000 to 383    LFT still correcting    PE unremarkable    A/P Gallstone Pancreatitis  Pancreatitis resolving  For Lap   Grazyna to prevent future occurrence.     NPO after MN  Will check with OR to see if this can be added to Dr Rosanna Bueno MD, Shelia Leonardo  [facility]

## 2017-10-15 NOTE — PROGRESS NOTES
GI Progress Note Shabbirjaki Deal)  NAME:Wilber Shelby :1935 KPS:[id number]   ATTG: Ginger Frederick MD  PCP: Flor Mayorga MD  Date/Time:  10/15/2017 11:21 AM   Assessment:   · GS pancreatitis- resolved, lipase is normal now   · Elevated LFTs- improving  · Abdominal pain- resolved      Plan:   · Surgical consult with Phill Rizo today- pt is NPO and lipase has normalized  · Keep NPO in the event that surgery is done today; if delayed until tomorrow, allow sips, and then make NPO after MN  · IVF  · IV narcotic analgesia PRN  · IV antiemetics PRN  · Check final MRI report, but review by two radiologists and myself shows multiple small GS, but no DD or retained CBD stone and no pancreatic inflammation     Subjective:   Discussed with RN events overnight. Feels well now. Complaint Y/N Description   Abdominal Pain n    Hematemesis n    Hematochezia n    Melena n    Constipation n    Diarrhea n    Dyspepsia n    Dysphagia n    Jaundiced n    Nausea/vomiting n      Review of Systems:  Symptom Y/N Comments  Symptom Y/N Comments   Fever/Chills n   Chest Pain n    Cough n   Headaches n    Sputum n   Joint Pain n    SOB/ADRIAN n   Pruritis/Rash n    Tolerating Diet  NPO  Other       Could NOT obtain due to:      Objective:   VITALS:   Last 24hrs VS reviewed since prior progress note. Most recent are:  Visit Vitals    /65 (BP 1 Location: Left arm, BP Patient Position: Lying right side)    Pulse (!) 47    Temp 98.1 °F (36.7 °C)    Resp 16    Ht 5' 3\" (1.6 m)    Wt 74.8 kg (165 lb)    SpO2 96%    BMI 29.23 kg/m2       Intake/Output Summary (Last 24 hours) at 10/15/17 1121  Last data filed at 10/15/17 0319   Gross per 24 hour   Intake           3297.5 ml   Output                0 ml   Net           3297.5 ml     PHYSICAL EXAM:  General: WD, WN. Alert, cooperative, no acute distress    HEENT: NC, Atraumatic. PERRL. Anicteric sclerae. Lungs:  CTA Bilaterally. No Wheezing/Rhonchi/Rales.   Heart:  Regular  rhythm,  No murmur/Rub/Gallops  Abdomen: Soft, Non distended, Non tender.  +BS  Extremities: No c/c/e  Neurologic:  CN 2-12 gi, A/O X 3. No acute neurological distress   Psych:   Good insight. Not anxious nor agitated. Lab and Radiology Data Reviewed: (see below)  MRCP 10/13/17 (PRELIMINARY REPORT):  Findings:   Common duct is normal in caliber. No stones within the duct  Multiple stones within the gallbladder. No pericholecystic fluid  Pancreatic duct is not dilated. No peripancreatic fluid collections or  significant edema  Final report to follow    Medications Reviewed: (see below)  PMH/SH reviewed - no change compared to H&P  ________________________________________________________________________  Total time spent with patient: 15 minutes ________________________________________________________________________  Care Plan discussed with:  Patient y   Family     RN y              Consultant:  Chapito Umaña MD     Procedures: see electronic medical records for all procedures/Xrays and details which were not copied into this note but were reviewed prior to creation of Plan. LABS:  Recent Labs      10/15/17   0329  10/14/17   0538   WBC  8.0  12.5*   HGB  12.9  13.2   HCT  37.2  37.9   PLT  149*  147*     Recent Labs      10/15/17   0329  10/14/17   0538  10/13/17   0555   NA  139  143  140   K  3.5  3.5  3.9   CL  107  111*  108   CO2  23  23  24   BUN  16  15  20   CREA  1.13  1.07  1.23   GLU  90  91  118*   CA  8.8  8.7  9.1   MG  1.8  1.8   --    PHOS  2.1*  2.0*   --      Recent Labs      10/15/17   0329  10/14/17   0538  10/13/17   0555   SGOT  83*  190*  672*   AP  163*  156*  184*   TP  6.2*  6.0*  6.8   ALB  3.0*  2.9*  3.6   GLOB  3.2  3.1  3.2   LPSE  383  2271*  >3000*     No results for input(s): INR, PTP, APTT in the last 72 hours. No lab exists for component: INREXT   No results for input(s): FE, TIBC, PSAT, FERR in the last 72 hours.    No results found for: FOL, RBCF  No results for input(s): PH, PCO2, PO2 in the last 72 hours. No results for input(s): CPK, CKMB in the last 72 hours.     No lab exists for component: TROPONINI  Lab Results   Component Value Date/Time    Color DARK YELLOW 10/13/2017 06:12 AM    Appearance CLEAR 10/13/2017 06:12 AM    Specific gravity 1.015 10/13/2017 06:12 AM    pH (UA) 5.5 10/13/2017 06:12 AM    Protein NEGATIVE  10/13/2017 06:12 AM    Glucose NEGATIVE  10/13/2017 06:12 AM    Ketone NEGATIVE  10/13/2017 06:12 AM    Bilirubin NEGATIVE  10/13/2017 06:12 AM    Urobilinogen 2.0 10/13/2017 06:12 AM    Nitrites NEGATIVE  10/13/2017 06:12 AM    Leukocyte Esterase NEGATIVE  10/13/2017 06:12 AM    Epithelial cells FEW 10/13/2017 06:12 AM    Bacteria NEGATIVE  10/13/2017 06:12 AM    WBC 0-4 10/13/2017 06:12 AM    RBC 0-5 10/13/2017 06:12 AM       MEDICATIONS:  Current Facility-Administered Medications   Medication Dose Route Frequency    sodium phosphate 15 mmol in 0.9% sodium chloride 250 mL infusion   IntraVENous ONCE    lactated Ringers infusion  150 mL/hr IntraVENous CONTINUOUS    HYDROmorphone (DILAUDID) injection 1 mg  1 mg IntraVENous Q4H PRN    sodium chloride (NS) flush 5-10 mL  5-10 mL IntraVENous Q8H    sodium chloride (NS) flush 5-10 mL  5-10 mL IntraVENous PRN    acetaminophen (TYLENOL) tablet 650 mg  650 mg Oral Q6H PRN    ondansetron (ZOFRAN ODT) tablet 4 mg  4 mg Oral Q6H PRN    insulin lispro (HUMALOG) injection   SubCUTAneous Q6H    glucose chewable tablet 16 g  4 Tab Oral PRN    dextrose (D50W) injection syrg 12.5-25 g  12.5-25 g IntraVENous PRN    glucagon (GLUCAGEN) injection 1 mg  1 mg IntraMUSCular PRN    hydrALAZINE (APRESOLINE) 20 mg/mL injection 10 mg  10 mg IntraVENous Q6H PRN    albuterol (PROVENTIL VENTOLIN) nebulizer solution 2.5 mg  2.5 mg Nebulization Q6H PRN    cholecalciferol (VITAMIN D3) tablet 1,000 Units  1,000 Units Oral DAILY    influenza vaccine 2017-18 (3 yrs+)(PF) (FLUZONE QUAD/FLUARIX QUAD) injection 0.5 mL  0.5 mL IntraMUSCular PRIOR TO DISCHARGE

## 2017-10-16 ENCOUNTER — APPOINTMENT (OUTPATIENT)
Dept: GENERAL RADIOLOGY | Age: 82
DRG: 418 | End: 2017-10-16
Attending: SURGERY
Payer: MEDICARE

## 2017-10-16 ENCOUNTER — ANESTHESIA (OUTPATIENT)
Dept: SURGERY | Age: 82
DRG: 418 | End: 2017-10-16
Payer: MEDICARE

## 2017-10-16 LAB
ALBUMIN SERPL-MCNC: 2.9 G/DL (ref 3.5–5)
ALBUMIN/GLOB SERPL: 0.9 {RATIO} (ref 1.1–2.2)
ALP SERPL-CCNC: 182 U/L (ref 45–117)
ALT SERPL-CCNC: 148 U/L (ref 12–78)
ANION GAP SERPL CALC-SCNC: 8 MMOL/L (ref 5–15)
AST SERPL-CCNC: 56 U/L (ref 15–37)
ATRIAL RATE: 53 BPM
BASOPHILS # BLD: 0 K/UL (ref 0–0.1)
BASOPHILS NFR BLD: 0 % (ref 0–1)
BILIRUB SERPL-MCNC: 1.1 MG/DL (ref 0.2–1)
BUN SERPL-MCNC: 13 MG/DL (ref 6–20)
BUN/CREAT SERPL: 13 (ref 12–20)
CALCIUM SERPL-MCNC: 8.7 MG/DL (ref 8.5–10.1)
CALCULATED P AXIS, ECG09: 31 DEGREES
CALCULATED R AXIS, ECG10: 47 DEGREES
CALCULATED T AXIS, ECG11: 41 DEGREES
CHLORIDE SERPL-SCNC: 106 MMOL/L (ref 97–108)
CO2 SERPL-SCNC: 26 MMOL/L (ref 21–32)
CREAT SERPL-MCNC: 0.99 MG/DL (ref 0.7–1.3)
DIAGNOSIS, 93000: NORMAL
EOSINOPHIL # BLD: 0.1 K/UL (ref 0–0.4)
EOSINOPHIL NFR BLD: 2 % (ref 0–7)
ERYTHROCYTE [DISTWIDTH] IN BLOOD BY AUTOMATED COUNT: 12.8 % (ref 11.5–14.5)
GLOBULIN SER CALC-MCNC: 3.3 G/DL (ref 2–4)
GLUCOSE BLD STRIP.AUTO-MCNC: 101 MG/DL (ref 65–100)
GLUCOSE BLD STRIP.AUTO-MCNC: 112 MG/DL (ref 65–100)
GLUCOSE BLD STRIP.AUTO-MCNC: 116 MG/DL (ref 65–100)
GLUCOSE BLD STRIP.AUTO-MCNC: 82 MG/DL (ref 65–100)
GLUCOSE SERPL-MCNC: 104 MG/DL (ref 65–100)
HCT VFR BLD AUTO: 36.8 % (ref 36.6–50.3)
HGB BLD-MCNC: 12.8 G/DL (ref 12.1–17)
LIPASE SERPL-CCNC: 250 U/L (ref 73–393)
LYMPHOCYTES # BLD: 0.7 K/UL (ref 0.8–3.5)
LYMPHOCYTES NFR BLD: 13 % (ref 12–49)
MAGNESIUM SERPL-MCNC: 1.9 MG/DL (ref 1.6–2.4)
MCH RBC QN AUTO: 30.3 PG (ref 26–34)
MCHC RBC AUTO-ENTMCNC: 34.8 G/DL (ref 30–36.5)
MCV RBC AUTO: 87.2 FL (ref 80–99)
MONOCYTES # BLD: 0.4 K/UL (ref 0–1)
MONOCYTES NFR BLD: 8 % (ref 5–13)
NEUTS SEG # BLD: 4 K/UL (ref 1.8–8)
NEUTS SEG NFR BLD: 77 % (ref 32–75)
P-R INTERVAL, ECG05: 152 MS
PHOSPHATE SERPL-MCNC: 2.7 MG/DL (ref 2.6–4.7)
PLATELET # BLD AUTO: 146 K/UL (ref 150–400)
POTASSIUM SERPL-SCNC: 3.3 MMOL/L (ref 3.5–5.1)
PROT SERPL-MCNC: 6.2 G/DL (ref 6.4–8.2)
Q-T INTERVAL, ECG07: 440 MS
QRS DURATION, ECG06: 80 MS
QTC CALCULATION (BEZET), ECG08: 412 MS
RBC # BLD AUTO: 4.22 M/UL (ref 4.1–5.7)
SERVICE CMNT-IMP: ABNORMAL
SERVICE CMNT-IMP: NORMAL
SODIUM SERPL-SCNC: 140 MMOL/L (ref 136–145)
VENTRICULAR RATE, ECG03: 53 BPM
WBC # BLD AUTO: 5.2 K/UL (ref 4.1–11.1)

## 2017-10-16 PROCEDURE — 77030008518 HC TBNG INSUF ENDO STRY -B: Performed by: SURGERY

## 2017-10-16 PROCEDURE — C1757 CATH, THROMBECTOMY/EMBOLECT: HCPCS | Performed by: SURGERY

## 2017-10-16 PROCEDURE — 77030008771 HC TU NG SALEM SUMP -A: Performed by: NURSE ANESTHETIST, CERTIFIED REGISTERED

## 2017-10-16 PROCEDURE — 93005 ELECTROCARDIOGRAM TRACING: CPT

## 2017-10-16 PROCEDURE — 74011250636 HC RX REV CODE- 250/636: Performed by: SURGERY

## 2017-10-16 PROCEDURE — 76210000017 HC OR PH I REC 1.5 TO 2 HR: Performed by: SURGERY

## 2017-10-16 PROCEDURE — 77030018875 HC APPL CLP LIG4 J&J -B: Performed by: SURGERY

## 2017-10-16 PROCEDURE — 77030020263 HC SOL INJ SOD CL0.9% LFCR 1000ML: Performed by: SURGERY

## 2017-10-16 PROCEDURE — 74300 X-RAY BILE DUCTS/PANCREAS: CPT

## 2017-10-16 PROCEDURE — 74011250636 HC RX REV CODE- 250/636: Performed by: HOSPITALIST

## 2017-10-16 PROCEDURE — 65270000029 HC RM PRIVATE

## 2017-10-16 PROCEDURE — 74011250636 HC RX REV CODE- 250/636: Performed by: ANESTHESIOLOGY

## 2017-10-16 PROCEDURE — 77030026438 HC STYL ET INTUB CARD -A: Performed by: NURSE ANESTHETIST, CERTIFIED REGISTERED

## 2017-10-16 PROCEDURE — 82962 GLUCOSE BLOOD TEST: CPT

## 2017-10-16 PROCEDURE — 77030032490 HC SLV COMPR SCD KNE COVD -B: Performed by: SURGERY

## 2017-10-16 PROCEDURE — 77030010507 HC ADH SKN DERMBND J&J -B: Performed by: SURGERY

## 2017-10-16 PROCEDURE — 80053 COMPREHEN METABOLIC PANEL: CPT | Performed by: GENERAL ACUTE CARE HOSPITAL

## 2017-10-16 PROCEDURE — 76060000033 HC ANESTHESIA 1 TO 1.5 HR: Performed by: SURGERY

## 2017-10-16 PROCEDURE — BF131ZZ FLUOROSCOPY OF GALLBLADDER AND BILE DUCTS USING LOW OSMOLAR CONTRAST: ICD-10-PCS | Performed by: SURGERY

## 2017-10-16 PROCEDURE — 77030035048 HC TRCR ENDOSC OPTCL COVD -B: Performed by: SURGERY

## 2017-10-16 PROCEDURE — 74011636320 HC RX REV CODE- 636/320: Performed by: SURGERY

## 2017-10-16 PROCEDURE — 77030002933 HC SUT MCRYL J&J -A: Performed by: SURGERY

## 2017-10-16 PROCEDURE — 36415 COLL VENOUS BLD VENIPUNCTURE: CPT | Performed by: GENERAL ACUTE CARE HOSPITAL

## 2017-10-16 PROCEDURE — 84100 ASSAY OF PHOSPHORUS: CPT | Performed by: GENERAL ACUTE CARE HOSPITAL

## 2017-10-16 PROCEDURE — 85025 COMPLETE CBC W/AUTO DIFF WBC: CPT | Performed by: GENERAL ACUTE CARE HOSPITAL

## 2017-10-16 PROCEDURE — 77030035045 HC TRCR ENDOSC VRSPRT BLDLSS COVD -B: Performed by: SURGERY

## 2017-10-16 PROCEDURE — 74011000250 HC RX REV CODE- 250: Performed by: SURGERY

## 2017-10-16 PROCEDURE — 77030019908 HC STETH ESOPH SIMS -A: Performed by: NURSE ANESTHETIST, CERTIFIED REGISTERED

## 2017-10-16 PROCEDURE — 77030008756 HC TU IRR SUC STRY -B: Performed by: SURGERY

## 2017-10-16 PROCEDURE — 77030008684 HC TU ET CUF COVD -B: Performed by: NURSE ANESTHETIST, CERTIFIED REGISTERED

## 2017-10-16 PROCEDURE — 83690 ASSAY OF LIPASE: CPT | Performed by: GENERAL ACUTE CARE HOSPITAL

## 2017-10-16 PROCEDURE — 77030020256 HC SOL INJ NACL 0.9%  500ML: Performed by: SURGERY

## 2017-10-16 PROCEDURE — 0FC94ZZ EXTIRPATION OF MATTER FROM COMMON BILE DUCT, PERCUTANEOUS ENDOSCOPIC APPROACH: ICD-10-PCS | Performed by: SURGERY

## 2017-10-16 PROCEDURE — 77030011640 HC PAD GRND REM COVD -A: Performed by: SURGERY

## 2017-10-16 PROCEDURE — C1758 CATHETER, URETERAL: HCPCS | Performed by: SURGERY

## 2017-10-16 PROCEDURE — 83735 ASSAY OF MAGNESIUM: CPT | Performed by: GENERAL ACUTE CARE HOSPITAL

## 2017-10-16 PROCEDURE — 76010000149 HC OR TIME 1 TO 1.5 HR: Performed by: SURGERY

## 2017-10-16 PROCEDURE — 74011000250 HC RX REV CODE- 250

## 2017-10-16 PROCEDURE — 74011250637 HC RX REV CODE- 250/637: Performed by: GENERAL ACUTE CARE HOSPITAL

## 2017-10-16 PROCEDURE — 74011250636 HC RX REV CODE- 250/636

## 2017-10-16 PROCEDURE — 77030008603 HC TRCR ENDOSC EPATH J&J -C: Performed by: SURGERY

## 2017-10-16 PROCEDURE — 0FT44ZZ RESECTION OF GALLBLADDER, PERCUTANEOUS ENDOSCOPIC APPROACH: ICD-10-PCS | Performed by: SURGERY

## 2017-10-16 RX ORDER — FENTANYL CITRATE 50 UG/ML
50 INJECTION, SOLUTION INTRAMUSCULAR; INTRAVENOUS AS NEEDED
Status: DISCONTINUED | OUTPATIENT
Start: 2017-10-16 | End: 2017-10-16 | Stop reason: HOSPADM

## 2017-10-16 RX ORDER — PROPOFOL 10 MG/ML
INJECTION, EMULSION INTRAVENOUS AS NEEDED
Status: DISCONTINUED | OUTPATIENT
Start: 2017-10-16 | End: 2017-10-16 | Stop reason: HOSPADM

## 2017-10-16 RX ORDER — HYDROMORPHONE HYDROCHLORIDE 2 MG/ML
.5-1 INJECTION, SOLUTION INTRAMUSCULAR; INTRAVENOUS; SUBCUTANEOUS
Status: DISCONTINUED | OUTPATIENT
Start: 2017-10-16 | End: 2017-10-17

## 2017-10-16 RX ORDER — MORPHINE SULFATE 10 MG/ML
2 INJECTION, SOLUTION INTRAMUSCULAR; INTRAVENOUS
Status: DISCONTINUED | OUTPATIENT
Start: 2017-10-16 | End: 2017-10-16 | Stop reason: HOSPADM

## 2017-10-16 RX ORDER — FENTANYL CITRATE 50 UG/ML
INJECTION, SOLUTION INTRAMUSCULAR; INTRAVENOUS AS NEEDED
Status: DISCONTINUED | OUTPATIENT
Start: 2017-10-16 | End: 2017-10-16 | Stop reason: HOSPADM

## 2017-10-16 RX ORDER — VECURONIUM BROMIDE FOR INJECTION 1 MG/ML
INJECTION, POWDER, LYOPHILIZED, FOR SOLUTION INTRAVENOUS AS NEEDED
Status: DISCONTINUED | OUTPATIENT
Start: 2017-10-16 | End: 2017-10-16 | Stop reason: HOSPADM

## 2017-10-16 RX ORDER — HYDROMORPHONE HYDROCHLORIDE 1 MG/ML
0.5 INJECTION, SOLUTION INTRAMUSCULAR; INTRAVENOUS; SUBCUTANEOUS
Status: DISCONTINUED | OUTPATIENT
Start: 2017-10-16 | End: 2017-10-16 | Stop reason: HOSPADM

## 2017-10-16 RX ORDER — ONDANSETRON 2 MG/ML
4 INJECTION INTRAMUSCULAR; INTRAVENOUS AS NEEDED
Status: DISCONTINUED | OUTPATIENT
Start: 2017-10-16 | End: 2017-10-16 | Stop reason: HOSPADM

## 2017-10-16 RX ORDER — LIDOCAINE HYDROCHLORIDE 10 MG/ML
0.1 INJECTION, SOLUTION EPIDURAL; INFILTRATION; INTRACAUDAL; PERINEURAL AS NEEDED
Status: DISCONTINUED | OUTPATIENT
Start: 2017-10-16 | End: 2017-10-16 | Stop reason: HOSPADM

## 2017-10-16 RX ORDER — CEFAZOLIN SODIUM IN 0.9 % NACL 2 G/100 ML
2-3 PLASTIC BAG, INJECTION (ML) INTRAVENOUS
Status: COMPLETED | OUTPATIENT
Start: 2017-10-16 | End: 2017-10-16

## 2017-10-16 RX ORDER — LIDOCAINE HYDROCHLORIDE 20 MG/ML
INJECTION, SOLUTION EPIDURAL; INFILTRATION; INTRACAUDAL; PERINEURAL AS NEEDED
Status: DISCONTINUED | OUTPATIENT
Start: 2017-10-16 | End: 2017-10-16 | Stop reason: HOSPADM

## 2017-10-16 RX ORDER — NEOSTIGMINE METHYLSULFATE 1 MG/ML
INJECTION INTRAVENOUS AS NEEDED
Status: DISCONTINUED | OUTPATIENT
Start: 2017-10-16 | End: 2017-10-16 | Stop reason: HOSPADM

## 2017-10-16 RX ORDER — SODIUM CHLORIDE, SODIUM LACTATE, POTASSIUM CHLORIDE, CALCIUM CHLORIDE 600; 310; 30; 20 MG/100ML; MG/100ML; MG/100ML; MG/100ML
100 INJECTION, SOLUTION INTRAVENOUS CONTINUOUS
Status: DISCONTINUED | OUTPATIENT
Start: 2017-10-16 | End: 2017-10-16 | Stop reason: HOSPADM

## 2017-10-16 RX ORDER — BUPIVACAINE HYDROCHLORIDE AND EPINEPHRINE 5; 5 MG/ML; UG/ML
INJECTION, SOLUTION EPIDURAL; INTRACAUDAL; PERINEURAL AS NEEDED
Status: DISCONTINUED | OUTPATIENT
Start: 2017-10-16 | End: 2017-10-16 | Stop reason: HOSPADM

## 2017-10-16 RX ORDER — SODIUM CHLORIDE 0.9 % (FLUSH) 0.9 %
5-10 SYRINGE (ML) INJECTION EVERY 8 HOURS
Status: DISCONTINUED | OUTPATIENT
Start: 2017-10-16 | End: 2017-10-16 | Stop reason: HOSPADM

## 2017-10-16 RX ORDER — GLYCOPYRROLATE 0.2 MG/ML
INJECTION INTRAMUSCULAR; INTRAVENOUS AS NEEDED
Status: DISCONTINUED | OUTPATIENT
Start: 2017-10-16 | End: 2017-10-16 | Stop reason: HOSPADM

## 2017-10-16 RX ORDER — POTASSIUM CHLORIDE 20 MEQ/1
40 TABLET, EXTENDED RELEASE ORAL
Status: COMPLETED | OUTPATIENT
Start: 2017-10-16 | End: 2017-10-16

## 2017-10-16 RX ORDER — SODIUM CHLORIDE AND POTASSIUM CHLORIDE .9; .15 G/100ML; G/100ML
SOLUTION INTRAVENOUS
Status: DISPENSED
Start: 2017-10-16 | End: 2017-10-17

## 2017-10-16 RX ORDER — MIDAZOLAM HYDROCHLORIDE 1 MG/ML
1 INJECTION, SOLUTION INTRAMUSCULAR; INTRAVENOUS AS NEEDED
Status: DISCONTINUED | OUTPATIENT
Start: 2017-10-16 | End: 2017-10-16 | Stop reason: HOSPADM

## 2017-10-16 RX ORDER — DIPHENHYDRAMINE HYDROCHLORIDE 50 MG/ML
12.5 INJECTION, SOLUTION INTRAMUSCULAR; INTRAVENOUS AS NEEDED
Status: DISCONTINUED | OUTPATIENT
Start: 2017-10-16 | End: 2017-10-16 | Stop reason: HOSPADM

## 2017-10-16 RX ORDER — SODIUM CHLORIDE 9 MG/ML
25 INJECTION, SOLUTION INTRAVENOUS CONTINUOUS
Status: DISCONTINUED | OUTPATIENT
Start: 2017-10-16 | End: 2017-10-16 | Stop reason: HOSPADM

## 2017-10-16 RX ORDER — CEFAZOLIN SODIUM IN 0.9 % NACL 2 G/100 ML
2-3 PLASTIC BAG, INJECTION (ML) INTRAVENOUS EVERY 8 HOURS
Status: COMPLETED | OUTPATIENT
Start: 2017-10-17 | End: 2017-10-17

## 2017-10-16 RX ORDER — HYDROCODONE BITARTRATE AND ACETAMINOPHEN 5; 325 MG/1; MG/1
1-2 TABLET ORAL
Status: DISCONTINUED | OUTPATIENT
Start: 2017-10-16 | End: 2017-10-19 | Stop reason: HOSPADM

## 2017-10-16 RX ORDER — SUCCINYLCHOLINE CHLORIDE 20 MG/ML
INJECTION INTRAMUSCULAR; INTRAVENOUS AS NEEDED
Status: DISCONTINUED | OUTPATIENT
Start: 2017-10-16 | End: 2017-10-16 | Stop reason: HOSPADM

## 2017-10-16 RX ORDER — ONDANSETRON 2 MG/ML
4 INJECTION INTRAMUSCULAR; INTRAVENOUS
Status: DISCONTINUED | OUTPATIENT
Start: 2017-10-16 | End: 2017-10-19 | Stop reason: HOSPADM

## 2017-10-16 RX ORDER — MIDAZOLAM HYDROCHLORIDE 1 MG/ML
1 INJECTION, SOLUTION INTRAMUSCULAR; INTRAVENOUS AS NEEDED
Status: DISCONTINUED | OUTPATIENT
Start: 2017-10-16 | End: 2017-10-16 | Stop reason: SDUPTHER

## 2017-10-16 RX ORDER — MIDAZOLAM HYDROCHLORIDE 1 MG/ML
0.5 INJECTION, SOLUTION INTRAMUSCULAR; INTRAVENOUS
Status: DISCONTINUED | OUTPATIENT
Start: 2017-10-16 | End: 2017-10-16 | Stop reason: HOSPADM

## 2017-10-16 RX ORDER — ETOMIDATE 2 MG/ML
INJECTION INTRAVENOUS AS NEEDED
Status: DISCONTINUED | OUTPATIENT
Start: 2017-10-16 | End: 2017-10-16 | Stop reason: HOSPADM

## 2017-10-16 RX ORDER — ACETAMINOPHEN 325 MG/1
650 TABLET ORAL
Status: DISCONTINUED | OUTPATIENT
Start: 2017-10-16 | End: 2017-10-19 | Stop reason: HOSPADM

## 2017-10-16 RX ORDER — ONDANSETRON 2 MG/ML
INJECTION INTRAMUSCULAR; INTRAVENOUS AS NEEDED
Status: DISCONTINUED | OUTPATIENT
Start: 2017-10-16 | End: 2017-10-16 | Stop reason: HOSPADM

## 2017-10-16 RX ORDER — FENTANYL CITRATE 50 UG/ML
25 INJECTION, SOLUTION INTRAMUSCULAR; INTRAVENOUS
Status: DISCONTINUED | OUTPATIENT
Start: 2017-10-16 | End: 2017-10-16 | Stop reason: HOSPADM

## 2017-10-16 RX ORDER — ROPIVACAINE HYDROCHLORIDE 5 MG/ML
30 INJECTION, SOLUTION EPIDURAL; INFILTRATION; PERINEURAL AS NEEDED
Status: DISCONTINUED | OUTPATIENT
Start: 2017-10-16 | End: 2017-10-16 | Stop reason: HOSPADM

## 2017-10-16 RX ORDER — SODIUM CHLORIDE AND POTASSIUM CHLORIDE .9; .15 G/100ML; G/100ML
SOLUTION INTRAVENOUS CONTINUOUS
Status: DISCONTINUED | OUTPATIENT
Start: 2017-10-16 | End: 2017-10-17

## 2017-10-16 RX ORDER — SODIUM CHLORIDE 0.9 % (FLUSH) 0.9 %
5-10 SYRINGE (ML) INJECTION AS NEEDED
Status: DISCONTINUED | OUTPATIENT
Start: 2017-10-16 | End: 2017-10-16 | Stop reason: HOSPADM

## 2017-10-16 RX ORDER — ACETAMINOPHEN 10 MG/ML
INJECTION, SOLUTION INTRAVENOUS AS NEEDED
Status: DISCONTINUED | OUTPATIENT
Start: 2017-10-16 | End: 2017-10-16 | Stop reason: HOSPADM

## 2017-10-16 RX ORDER — HYDRALAZINE HYDROCHLORIDE 20 MG/ML
INJECTION INTRAMUSCULAR; INTRAVENOUS AS NEEDED
Status: DISCONTINUED | OUTPATIENT
Start: 2017-10-16 | End: 2017-10-16 | Stop reason: HOSPADM

## 2017-10-16 RX ORDER — DOCUSATE SODIUM 100 MG/1
100 CAPSULE, LIQUID FILLED ORAL 2 TIMES DAILY
Status: DISCONTINUED | OUTPATIENT
Start: 2017-10-16 | End: 2017-10-19 | Stop reason: HOSPADM

## 2017-10-16 RX ADMIN — POTASSIUM CHLORIDE 40 MEQ: 1500 TABLET, EXTENDED RELEASE ORAL at 08:16

## 2017-10-16 RX ADMIN — SODIUM CHLORIDE, POTASSIUM CHLORIDE, SODIUM LACTATE AND CALCIUM CHLORIDE: 600; 310; 30; 20 INJECTION, SOLUTION INTRAVENOUS at 15:45

## 2017-10-16 RX ADMIN — FENTANYL CITRATE 25 MCG: 50 INJECTION, SOLUTION INTRAMUSCULAR; INTRAVENOUS at 18:40

## 2017-10-16 RX ADMIN — VECURONIUM BROMIDE FOR INJECTION 3 MG: 1 INJECTION, POWDER, LYOPHILIZED, FOR SOLUTION INTRAVENOUS at 16:36

## 2017-10-16 RX ADMIN — Medication 10 ML: at 21:54

## 2017-10-16 RX ADMIN — CEFAZOLIN 2 G: 10 INJECTION, POWDER, FOR SOLUTION INTRAVENOUS; PARENTERAL at 16:34

## 2017-10-16 RX ADMIN — Medication 10 ML: at 08:16

## 2017-10-16 RX ADMIN — PROPOFOL 30 MG: 10 INJECTION, EMULSION INTRAVENOUS at 16:24

## 2017-10-16 RX ADMIN — HYDRALAZINE HYDROCHLORIDE 5 MG: 20 INJECTION INTRAMUSCULAR; INTRAVENOUS at 16:58

## 2017-10-16 RX ADMIN — Medication 10 ML: at 21:55

## 2017-10-16 RX ADMIN — HYDRALAZINE HYDROCHLORIDE 5 MG: 20 INJECTION INTRAMUSCULAR; INTRAVENOUS at 16:50

## 2017-10-16 RX ADMIN — HYDRALAZINE HYDROCHLORIDE 10 MG: 20 INJECTION INTRAMUSCULAR; INTRAVENOUS at 09:03

## 2017-10-16 RX ADMIN — GLYCOPYRROLATE 0.4 MG: 0.2 INJECTION INTRAMUSCULAR; INTRAVENOUS at 17:17

## 2017-10-16 RX ADMIN — VECURONIUM BROMIDE FOR INJECTION 1 MG: 1 INJECTION, POWDER, LYOPHILIZED, FOR SOLUTION INTRAVENOUS at 16:24

## 2017-10-16 RX ADMIN — HYDROMORPHONE HYDROCHLORIDE 0.5 MG: 2 INJECTION INTRAMUSCULAR; INTRAVENOUS; SUBCUTANEOUS at 23:39

## 2017-10-16 RX ADMIN — LIDOCAINE HYDROCHLORIDE 60 MG: 20 INJECTION, SOLUTION EPIDURAL; INFILTRATION; INTRACAUDAL; PERINEURAL at 16:24

## 2017-10-16 RX ADMIN — SUCCINYLCHOLINE CHLORIDE 140 MG: 20 INJECTION INTRAMUSCULAR; INTRAVENOUS at 16:24

## 2017-10-16 RX ADMIN — ONDANSETRON 4 MG: 2 INJECTION INTRAMUSCULAR; INTRAVENOUS at 18:28

## 2017-10-16 RX ADMIN — ONDANSETRON 4 MG: 2 INJECTION INTRAMUSCULAR; INTRAVENOUS at 17:14

## 2017-10-16 RX ADMIN — ETOMIDATE 10 MG: 2 INJECTION INTRAVENOUS at 16:24

## 2017-10-16 RX ADMIN — FENTANYL CITRATE 25 MCG: 50 INJECTION, SOLUTION INTRAMUSCULAR; INTRAVENOUS at 18:45

## 2017-10-16 RX ADMIN — NEOSTIGMINE METHYLSULFATE 3 MG: 1 INJECTION INTRAVENOUS at 17:17

## 2017-10-16 RX ADMIN — ACETAMINOPHEN 1000 MG: 10 INJECTION, SOLUTION INTRAVENOUS at 16:49

## 2017-10-16 RX ADMIN — FENTANYL CITRATE 25 MCG: 50 INJECTION, SOLUTION INTRAMUSCULAR; INTRAVENOUS at 17:20

## 2017-10-16 RX ADMIN — HYDROMORPHONE HYDROCHLORIDE 0.5 MG: 2 INJECTION INTRAMUSCULAR; INTRAVENOUS; SUBCUTANEOUS at 21:51

## 2017-10-16 RX ADMIN — FENTANYL CITRATE 100 MCG: 50 INJECTION, SOLUTION INTRAMUSCULAR; INTRAVENOUS at 16:24

## 2017-10-16 NOTE — PROGRESS NOTES
CM received call from Ms. Hetal Vail with [facility] and she advised pt is a resident with spouse on the residential living area. Pt will receive transportation via [facility] at discharge. CM was advised to contact Ms. Bryan for transportation on Wednesday and Thursday at [phone] x [phone]. Contact Ms. Julia Smallwoodniasancho on Tuesday and Friday [phone] x [phone]. CM will need to fax discharge summary to [phone].      Parisa García, MSW, [address] [place] [address]   [phone]

## 2017-10-16 NOTE — ANESTHESIA PREPROCEDURE EVALUATION
Anesthetic History   No history of anesthetic complications            Review of Systems / Medical History  Patient summary reviewed, nursing notes reviewed and pertinent labs reviewed    Pulmonary  Within defined limits                 Neuro/Psych   Within defined limits           Cardiovascular  Within defined limits  Hypertension              Exercise tolerance: >4 METS     GI/Hepatic/Renal  Within defined limits              Endo/Other  Within defined limits      Arthritis     Other Findings            Physical Exam    Airway  Mallampati: II  TM Distance: 4 - 6 cm  Neck ROM: decreased range of motion   Mouth opening: Normal     Cardiovascular  Regular rate and rhythm,  S1 and S2 normal,  no murmur, click, rub, or gallop             Dental  No notable dental hx       Pulmonary  Breath sounds clear to auscultation               Abdominal  GI exam deferred       Other Findings            Anesthetic Plan    ASA: 2  Anesthesia type: general          Induction: Intravenous  Anesthetic plan and risks discussed with: Patient

## 2017-10-16 NOTE — ROUTINE PROCESS
General Surgery End of Shift Nursing Note    Bedside shift change report given to Addis Bello RN (oncoming nurse) by Jd Orozco RN and Vi Gaxiola RN (offgoing nurse). Report included the following information SBAR, Kardex, Intake/Output and MAR. Shift worked:   7 PM to 7 AM   Significant changes during shift:    PT had no issues during the night. PT was compliant with calling nursing staff for inés attempts to void. PT reports 0/10 pain. PT has been NPO since midnight. Non-emergent issues for physician to address:   N/A     Number times ambulated in hallway past shift: 0    Number of times OOB to chair past shift: 0    Pain Management:  Current medication: None  Patient states pain is manageable on current pain medication: YES    GI:    Current diet:  DIET NPO    Tolerating current diet: YES  Passing flatus: YES  Last Bowel Movement: today   Appearance: Watery    Respiratory:    Incentive Spirometer at bedside: YES  Patient instructed on use: YES    Patient Safety:    Falls Score: 2  Bed Alarm On? Yes  Sitter?  No    Mary Sawyer

## 2017-10-16 NOTE — PROGRESS NOTES
Spiritual Care Assessment/Progress Notes    Fausto Lindquist [id number]  [id number]    1935  80 y.o.  male    Patient Telephone Number: [phone] (home)   Moravian Affiliation: Sabianism   Language: English   Extended Emergency Contact Information  Primary Emergency Contact: Vida Palm  Address: [address] [facility], [address] Phone: [phone]  Relation: Spouse   Patient Active Problem List    Diagnosis Date Noted    Acute pancreatitis 10/13/2017    Hyperparathyroidism, unspecified 12/12/2016    Hypocalcemia 12/12/2016    Elevated alkaline phosphatase level     Gallstones 07/31/2014        Date: 10/16/2017       Level of Moravian/Spiritual Activity:  []         Involved in aminata tradition/spiritual practice    []         Not involved in aminata tradition/spiritual practice  [x]         Spiritually oriented    []         Claims no spiritual orientation    []         seeking spiritual identity  []         Feels alienated from Episcopal practice/tradition  []         Feels angry about Episcopal practice/tradition  []         Spirituality/Episcopal tradition a resource for coping at this time.   []         Not able to assess due to medical condition    Services Provided Today:  []         crisis intervention    []         reading Scriptures  [x]         spiritual assessment    []         prayer  []         empathic listening/emotional support  []         rites and rituals (cite in comments)  [x]         life review     []         Episcopal support  []         theological development   []         advocacy  []         ethical dialog     []         blessing  []         bereavement support    []         support to family  []         anticipatory grief support   []         help with AMD  []         spiritual guidance    []         meditation      Spiritual Care Needs  []         Emotional Support  []         Spiritual/Moravian Care  [] Loss/Adjustment  []         Advocacy/Referral                /Ethics  [x]         No needs expressed at               this time  []         Other: (note in               comments)  [address]  []         Follow up visits with               pt/family  []         Provide materials  []         Schedule sacraments  []         Contact Community               Clergy  [x]         Follow up as needed  []         Other: (note in               comments)     Initial Spiritual Assessment in 2184. Patient watching television but engaged with  upon entering room. Pt informed that he was supposed to have a procedure earlier in the day but this was delayed. Despite this pt self-reported to be coping well and in no rush. Provided active listening as pt went on to share about [id number] [facility] home patient lives in along with spouse. Indicated that friends weren't able to drive to hospital to visit pt, but feels well supported. Pt identifies as Buddhism and takes part in [address] [place] [address] services at the home. Pt appeared to be in positive spirits, expressed no real anxiety or apprehension about procedure and invited  for follow up visit. Will follow up as able. Pronounced blessing over procedure. RENZO Vega

## 2017-10-16 NOTE — PERIOP NOTES
TRANSFER - IN REPORT:    Verbal report received from Cora Major [facility] on Franky Valdovinos  being received from 2184 for ordered procedure      Report consisted of patients Situation, Background, Assessment and   Recommendations(SBAR). Information from the following report(s) SBAR, Kardex, Intake/Output, MAR and Recent Results was reviewed with the receiving nurse. Opportunity for questions and clarification was provided. Assessment completed upon patients arrival to unit and care assumed. 1055:  Patient to OR Holding & assisted OOB to BR to void    1059:   Dr. Laverne Andrea at bedside    1112:  Dr. Alivia Cordero at bedside    [phone]:  Report phoned to Cora Major RN, Patient's surgery is delayed till later    [phone]:  Spoke with Cora Major RN, report obtained. Will  patient for scheduled surgery    1534:  Patient back to OR Holding from floor. Denies any pain difficulties at this time.     1557: Dr. Neeta Lloyd made aware of VS.  No new orders at this time

## 2017-10-16 NOTE — PROGRESS NOTES
Pt is a 81 y/o  male admitted for acute pancreatitis. Pt resides with spouse at the [address] in the residential setting area. Pt resides in a one story area with four steps (to include ramp) in facility. Pt is independent with ADLs not to include driving. Pt has no previous HH, SNF or DME providers. Pt received medication from facility. Pt will be transported at discharge via [address] (see previous note for contact information. )     CM met with pt to complete initial assessment. Pt is alert and oriented to person, place,time and situation. CM will continue to follow pt to assist with discharge plans as needed. Care Management Interventions  PCP Verified by CM: Yes (Dr. Trenna Cooks )  Mode of Transport at Discharge:  [facility] (see comment[facility]  Transition of Care Consult (CM Consult): Discharge Planning (CM to assist as needed )  Discharge Durable Medical Equipment: No  Health Maintenance Reviewed: Yes  Physical Therapy Consult: No  Occupational Therapy Consult: No  Speech Therapy Consult: No  Current Support Network: Lives with Spouse, Other (Pt resides with spouse in a retirenment community )  Confirm Follow Up Transport: Other (see comment) ([address] )  Plan discussed with Pt/Family/Caregiver: Yes  Discharge Location  Discharge Placement: Home with family assistance    MAX Beltran, [address] [place] [address]   [phone]

## 2017-10-16 NOTE — ROUTINE PROCESS
TRANSFER - OUT REPORT:    Verbal report given to flaquito bojorquez (name) on Julia Conroy  being transferred to pre-op (unit) for routine progression of care       Report consisted of patients Situation, Background, Assessment and   Recommendations(SBAR). Information from the following report(s) SBAR, Kardex, Intake/Output and MAR was reviewed with the receiving nurse. Lines:   Peripheral IV 10/13/17 Right Forearm (Active)   Site Assessment Clean, dry, & intact 10/16/2017  7:35 AM   Phlebitis Assessment 0 10/16/2017  7:35 AM   Infiltration Assessment 0 10/16/2017  7:35 AM   Dressing Status Clean, dry, & intact 10/16/2017  7:35 AM   Dressing Type Tape;Transparent 10/16/2017  7:35 AM   Hub Color/Line Status Pink;Flushed 10/16/2017  7:35 AM        Opportunity for questions and clarification was provided.       Patient transported with:   Monitor  Registered Nurse

## 2017-10-16 NOTE — PROGRESS NOTES
GI Progress Note Shabbir Say)  NAME:Wilber Shelby :1935 SAT:[id number]   ATTG: Ginger Frederick MD  PCP: Flor Mayorga MD  Date/Time:  10/16/2017 1056  Assessment:   · GS pancreatitis- resolved, lipase is normal now   · Elevated LFTs- improving, but T.Bili minimally increased  · Abdominal pain- resolved      Plan:   · Lap samir with IOC by Crow Castro today- pt is NPO and lipase has normalized, but Bili minimally increased. Will consent for ERCP at this time in event that  [address] is positive  · IVF  · IV narcotic analgesia PRN  · IV antiemetics PRN     Subjective:   Discussed with RN events overnight. Seen in [facility] 30, awaiting surgery. Feels well. Complaint Y/N Description   Abdominal Pain n    Hematemesis n    Hematochezia n    Melena n    Constipation n    Diarrhea n    Dyspepsia n    Dysphagia n    Jaundiced n    Nausea/vomiting n      Review of Systems:  Symptom Y/N Comments  Symptom Y/N Comments   Fever/Chills n   Chest Pain n    Cough n   Headaches n    Sputum n   Joint Pain n    SOB/ADRIAN n   Pruritis/Rash n    Tolerating Diet  NPO  Other       Could NOT obtain due to:      Objective:   VITALS:   Last 24hrs VS reviewed since prior progress note. Most recent are:  Visit Vitals    /72    Pulse (!) 53    Temp 97.5 °F (36.4 °C)    Resp 18    Ht 5' 3\" (1.6 m)    Wt 74.8 kg (165 lb)    SpO2 98%    BMI 29.23 kg/m2       Intake/Output Summary (Last 24 hours) at 10/16/17 1056  Last data filed at 10/16/17 0738   Gross per 24 hour   Intake           4247.5 ml   Output                0 ml   Net           4247.5 ml     PHYSICAL EXAM:  General: WD, WN. Alert, cooperative, no acute distress    HEENT: NC, Atraumatic. PERRL. Anicteric sclerae. Lungs:  CTA Bilaterally. No Wheezing/Rhonchi/Rales. Heart:  Regular  rhythm,  No murmur/Rub/Gallops  Abdomen: Soft, Non distended, Non tender.  +BS  Extremities: No c/c/e  Neurologic:  CN 2-12 gi, A/O X 3.   No acute neurological distress   Psych:   Good insight. Not anxious nor agitated. Lab and Radiology Data Reviewed: (see below)  MRCP 10/13/17: IMPRESSION:    1. Cholelithiasis. Common duct is normal caliber. No stones are identified  within the duct. There is a small amount of nonspecific fluid adjacent to the  duodenum/gallbladder neck.     Medications Reviewed: (see below)  PMH/SH reviewed - no change compared to H&P  ________________________________________________________________________  Total time spent with patient: 15 minutes ________________________________________________________________________  Care Plan discussed with:  Patient y   Family     RN y              Consultant:  Pascual Perez MD     Procedures: see electronic medical records for all procedures/Xrays and details which were not copied into this note but were reviewed prior to creation of Plan. LABS:  Recent Labs      10/16/17   0329  10/15/17   0329   WBC  5.2  8.0   HGB  12.8  12.9   HCT  36.8  37.2   PLT  146*  149*     Recent Labs      10/16/17   0329  10/15/17   0329  10/14/17   0538   NA  140  139  143   K  3.3*  3.5  3.5   CL  106  107  111*   CO2  26  23  23   BUN  13  16  15   CREA  0.99  1.13  1.07   GLU  104*  90  91   CA  8.7  8.8  8.7   MG  1.9  1.8  1.8   PHOS  2.7  2.1*  2.0*     Recent Labs      10/16/17   0329  10/15/17   0329  10/14/17   0538   SGOT  56*  83*  190*   AP  182*  163*  156*   TP  6.2*  6.2*  6.0*   ALB  2.9*  3.0*  2.9*   GLOB  3.3  3.2  3.1   LPSE  250  383  2271*     No results for input(s): INR, PTP, APTT in the last 72 hours. No lab exists for component: INREXT, INREXT   No results for input(s): FE, TIBC, PSAT, FERR in the last 72 hours. No results found for: FOL, RBCF  No results for input(s): PH, PCO2, PO2 in the last 72 hours. No results for input(s): CPK, CKMB in the last 72 hours.     No lab exists for component: TROPONINI  Lab Results   Component Value Date/Time    Color DARK YELLOW 10/13/2017 06:12 AM    Appearance CLEAR 10/13/2017 06:12 AM Specific gravity 1.015 10/13/2017 06:12 AM    pH (UA) 5.5 10/13/2017 06:12 AM    Protein NEGATIVE  10/13/2017 06:12 AM    Glucose NEGATIVE  10/13/2017 06:12 AM    Ketone NEGATIVE  10/13/2017 06:12 AM    Bilirubin NEGATIVE  10/13/2017 06:12 AM    Urobilinogen 2.0 10/13/2017 06:12 AM    Nitrites NEGATIVE  10/13/2017 06:12 AM    Leukocyte Esterase NEGATIVE  10/13/2017 06:12 AM    Epithelial cells FEW 10/13/2017 06:12 AM    Bacteria NEGATIVE  10/13/2017 06:12 AM    WBC 0-4 10/13/2017 06:12 AM    RBC 0-5 10/13/2017 06:12 AM       MEDICATIONS:  Current Facility-Administered Medications   Medication Dose Route Frequency    ceFAZolin (ANCEF) 2g in 100 ml 0.9% NS IVPB  2-3 g IntraVENous ON CALL TO OR    lactated Ringers infusion  150 mL/hr IntraVENous CONTINUOUS    HYDROmorphone (DILAUDID) injection 1 mg  1 mg IntraVENous Q4H PRN    sodium chloride (NS) flush 5-10 mL  5-10 mL IntraVENous Q8H    sodium chloride (NS) flush 5-10 mL  5-10 mL IntraVENous PRN    acetaminophen (TYLENOL) tablet 650 mg  650 mg Oral Q6H PRN    ondansetron (ZOFRAN ODT) tablet 4 mg  4 mg Oral Q6H PRN    insulin lispro (HUMALOG) injection   SubCUTAneous Q6H    glucose chewable tablet 16 g  4 Tab Oral PRN    dextrose (D50W) injection syrg 12.5-25 g  12.5-25 g IntraVENous PRN    glucagon (GLUCAGEN) injection 1 mg  1 mg IntraMUSCular PRN    hydrALAZINE (APRESOLINE) 20 mg/mL injection 10 mg  10 mg IntraVENous Q6H PRN    albuterol (PROVENTIL VENTOLIN) nebulizer solution 2.5 mg  2.5 mg Nebulization Q6H PRN    cholecalciferol (VITAMIN D3) tablet 1,000 Units  1,000 Units Oral DAILY    influenza vaccine 2017-18 (3 yrs+)(PF) (FLUZONE QUAD/FLUARIX QUAD) injection 0.5 mL  0.5 mL IntraMUSCular PRIOR TO DISCHARGE

## 2017-10-16 NOTE — PROGRESS NOTES
General Surgery End of Shift Nursing Note    Bedside shift change report given to ,RN (oncoming nurse) by Promise Nguyen RN (offgoing nurse). Report included the following information SBAR, Kardex, Intake/Output and MAR. Shift worked:   7a-7p   Significant changes during shift:    Pt to OR today for Lap Grazyna. Pt's Hr remains in the 40's at rest. MD aware. Non-emergent issues for physician to address:        Number times ambulated in hallway past shift: 0    Number of times OOB to chair past shift: 3    Pain Management:  Current medication:   Patient states pain is manageable on current pain medication: YES    GI:    Current diet:  DIET NPO    Tolerating current diet: YES  Passing flatus: YES  Last Bowel Movement: today   Appearance: loose    Respiratory:    Incentive Spirometer at bedside: YES  Patient instructed on use: YES    Patient Safety:      Bed Alarm On? Yes  Sitter?  No    Karishma Robledo RN

## 2017-10-16 NOTE — ANESTHESIA POSTPROCEDURE EVALUATION
Post-Anesthesia Evaluation and Assessment    Patient: Saurabh Jensen MRN: [id number]  SSN: [id number]    YOB: 1935  Age: 80 y.o. Sex: male       Cardiovascular Function/Vital Signs  Visit Vitals    /59    Pulse 67    Temp 37.1 °C (98.8 °F)    Resp 25    Ht 5' 3\" (1.6 m)    Wt 74.4 kg (164 lb)    SpO2 94%    BMI 29.05 kg/m2       Patient is status post general anesthesia for Procedure(s):  CHOLECYSTECTOMY LAPAROSCOPIC WITH GRAMS WITH COMMON BILE DUCT EXPLORATION. Nausea/Vomiting: None    Postoperative hydration reviewed and adequate. Pain:  Pain Scale 1: Numeric (0 - 10) (10/16/17 6327)  Pain Intensity 1: 0 (10/16/17 6019)   Managed    Neurological Status:   Neuro (WDL): Within Defined Limits (10/16/17 1110)   At baseline    Mental Status and Level of Consciousness: Arousable    Pulmonary Status:   O2 Device: Nasal cannula (10/16/17 1387)   Adequate oxygenation and airway patent    Complications related to anesthesia: None    Post-anesthesia assessment completed.  No concerns    Signed By: Quintin Azevedo MD     October 16, 2017

## 2017-10-16 NOTE — PROGRESS NOTES
Hospitalist Progress Note    NAME: Fausto Lindquist   :  1935   MRN:  [id number]       Assessment / Plan:  Acute pancreatitis, POA lipase >3000, improved to 383, likely secondary to gallstones  Elevated LFTs and bilirubin, POA, secondary to gallstone pancreatitis  Hx gallstones  HTN  Diarrhea  Arthritis  Secondary PTH due to vitamin D deficiency  -Diet as tolerated  -MRI Prelim Results: Common duct is normal in caliber. No stones within the duct. Multiple stones within the gallbladder. No pericholecystic fluid. Pancreatic duct is not dilated. No peripancreatic fluid collections or significant edema - still pending final results  -Pain meds prn  -GI input appreciated  -Continue holding BP meds, IV hydralazine prn  -Continue Vitamin D  -WBC improved to 8.0 from 13  -LFTs remain deranged but improving,  to 212,  to 83, Alk Phos 184 to 163  -Appreciate GI Input  -Pt prep'd for lap samir today with Dr. Yared Ferrera    Hypophosphatemia, resolved  Body mass index is 29.23 kg/(m^2).     Code status: Full  Prophylaxis: SCD's - switched from Lovenox  Recommended Disposition: Home w/Family     Subjective:     Chief Complaint / Reason for Physician Visit  \"I am fine, stomach feels good\". Discussed with RN events overnight. Review of Systems:  Symptom Y/N Comments  Symptom Y/N Comments   Fever/Chills n   Chest Pain n    Poor Appetite    Edema     Cough n   Abdominal Pain n    Sputum    Joint Pain     SOB/ADRIAN n   Pruritis/Rash     Nausea/vomit n   Tolerating PT/OT     Diarrhea    Tolerating Diet y    Constipation    Other  Kept NPO     Could NOT obtain due to:      Objective:     VITALS:   Last 24hrs VS reviewed since prior progress note.  Most recent are:  Patient Vitals for the past 24 hrs:   Temp Pulse Resp BP SpO2   10/16/17 1559 - (!) 56 16 192/54 95 %   10/16/17 1532 98.3 °F (36.8 °C) (!) 52 20 192/55 96 %   10/16/17 1155 98.1 °F (36.7 °C) (!) 51 19 191/60 98 %   10/16/17 1059 97.9 °F (36.6 °C) (!) 52 20 190/54 95 %   10/16/17 1026 - (!) 53 - 189/72 -   10/16/17 0735 97.5 °F (36.4 °C) 71 18 195/62 98 %   10/16/17 0315 97.6 °F (36.4 °C) (!) 50 12 193/75 96 %   10/16/17 0001 98.4 °F (36.9 °C) (!) 49 12 189/75 94 %   10/15/17 1938 98.8 °F (37.1 °C) (!) 48 14 172/64 98 %       Intake/Output Summary (Last 24 hours) at 10/16/17 1603  Last data filed at 10/16/17 1539   Gross per 24 hour   Intake           4932.5 ml   Output                0 ml   Net           4932.5 ml        PHYSICAL EXAM:  General:                    WD, WN. Alert, cooperative, no acute distress    EENT:                       EOMI. Anicteric sclerae. MMM  Resp:                        CTA bilaterally, no wheezing or rales.  No accessory muscle use  CV:                            Regular  rhythm,  No edema  GI:                              Soft, non distended, no tenderness to deep palpation, no rebound tenderness, BS+, negative Mckeon's  Neurologic:                Alert and oriented X 3, normal speech,  No focal deficits  Psych:                       Good insight. Not anxious nor agitated  Skin:                           No rashes.  No jaundice    Reviewed most current lab test results and cultures  YES  Reviewed most current radiology test results   YES  Review and summation of old records today    NO  Reviewed patient's current orders and MAR    YES  PMH/ reviewed - no change compared to H&P  ________________________________________________________________________  Care Plan discussed with:    Comments   Patient y    Family      RN     Care Manager     Consultant                        Multidiciplinary team rounds were held today with , nursing, pharmacist and clinical coordinator. Patient's plan of care was discussed; medications were reviewed and discharge planning was addressed.      ________________________________________________________________________  Total NON critical care TIME:  30   Minutes    Total CRITICAL CARE TIME Spent: Minutes non procedure based      Comments   >50% of visit spent in counseling and coordination of care     ________________________________________________________________________  Kodi Henson MD     Procedures: see electronic medical records for all procedures/Xrays and details which were not copied into this note but were reviewed prior to creation of Plan. LABS:  I reviewed today's most current labs and imaging studies.   Pertinent labs include:  Recent Labs      10/16/17   0329  10/15/17   0329  10/14/17   0538   WBC  5.2  8.0  12.5*   HGB  12.8  12.9  13.2   HCT  36.8  37.2  37.9   PLT  146*  149*  147*     Recent Labs      10/16/17   0329  10/15/17   0329  10/14/17   0538   NA  140  139  143   K  3.3*  3.5  3.5   CL  106  107  111*   CO2  26  23  23   GLU  104*  90  91   BUN  13  16  15   CREA  0.99  1.13  1.07   CA  8.7  8.8  8.7   MG  1.9  1.8  1.8   PHOS  2.7  2.1*  2.0*   ALB  2.9*  3.0*  2.9*   TBILI  1.1*  1.7*  2.6*   SGOT  56*  83*  190*   ALT  148*  212*  329*       Signed: Kodi Henson MD

## 2017-10-16 NOTE — PROGRESS NOTES
Surgical Note    Date/Time:  10/16/2017 10:43 AM        Assessment :    Plan:  Patient Active Problem List   Diagnosis Code    Gallstones K80.20    Elevated alkaline phosphatase level R74.8    Hyperparathyroidism, unspecified E21.3    Hypocalcemia E83.51    Acute pancreatitis K85.90        Seen in  for symptomatic gallstones. Cancelled the day before surgery after he was feeling better  Now here with gallstone pancreatitis    I Recommend we proceed with a Laparoscopic Cholecystectomy with Intraoperative Cholangiogram.    Risks, Benefits, and Alternatives of the procedure were discussed with Julia Conroy and his wife including:  the risk of the anesthesia, bleeding, infection, injury to the intestines, injury to the ducts, conversion to an open procedure, and the lack of symptomatic improvement. The patient is agreeable to proceed. OR today. Expressed understanding. Subjective:     Chief Complaint:      Review of Systems:  Y  N       Y  N  [] [x]  Fever/chills                                               [] [x]  Chest Pain  [] [x]  Cough                                                       [] [x]  Diarrhea   [] [x]  Sputum                                                     [] [x]  Constipation  [] [x]  SOB/ADRIAN                                                [] [x]  Nausea/Vomit  [] [x]  Abd Pain                                                    [] [x]  Tolerating diet  [] [x]  Dysuria                                                           []Unable to obtain  ROS due to  []mental status change  []sedated   []intubated    Objective:     VITALS:   Last 24hrs VS reviewed since prior hospitalist progress note.  Most recent are:  Visit Vitals    /72    Pulse (!) 53    Temp 97.5 °F (36.4 °C)    Resp 18    Ht 5' 3\" (1.6 m)    Wt 74.8 kg (165 lb)    SpO2 98%    BMI 29.23 kg/m2     Temp (24hrs), Av.2 °F (36.8 °C), Min:97.5 °F (36.4 °C), Max:98.8 °F (37.1 °C)      Intake/Output Summary (Last 24 hours) at 10/16/17 1043  Last data filed at 10/16/17 0738   Gross per 24 hour   Intake           4247.5 ml   Output                0 ml   Net           4247.5 ml         []Rodriguez []NGT  []Intubated on vent    PHYSICAL EXAM:  Gen:  [] A&O  []non-toxic  [x] No acute distress             [] ill apearing  []  Critical        HEENT:   [x]NC/AT/PERRLA/EOMI    []pink conjunctivae      []pale conjunctivae                  PERRL  []yes  []no      []moist mucosa    []dry mucosa    hearing intact to voice []yes  []No    RESP:   [x]CTA bialterally/no wheezing/rhonchi/rales/crackles    []clear bilaterally  []wheezing   []rhonchi   []crackles     use of accessory muscles []yes []no    CARD:   [x] regular rate and rhythm/No murmurs/rubs/gallops    murmur  []yes ()  []no      Rubs  []yes  []no       Gallops []yes  []no    Rate [] regular  [] irregular        carotid bruits  []Right  [] Left                 LE edema []yes  []no           JVP  [] yes   [] no    ABD:    [x]soft  [x]non distended  [x]non tender  [] NABS    SKIN:   Rashes [] yes   [x] no    Ulcers [] yes   [x] no               [x]normal   []tight to palpitation    skin turgor [] good  []poor  []decreased               Cyanosis/clubbing [] yes [x] no    NEUR:   [x]cranial nerves II-XII grossly intact       []Cranial nerves deficit                 [] facial droop    [] slurred speech   []aphasic     []Strength normal     [] weakness  [] LUE  []  RUE/ [] LLE  []  RLE    follows commands  [] yes [] no           PSYCH:   insight []poor [x]good   Alert and Oriented to  [x]person  [x]place  [x] time                    []depressed []anxious []agitated  []lethargic []stuporous  []sedated           Lab Data Reviewed: (see below)    Medications Reviewed: (see below)    PMH/SH reviewed - no change compared to H&P    Care Plan discussed with:  [x]Patient   [x]Family    []Care Manager   [x]RN    []Consultant/Specialist :    Prophylaxis:  []Lovenox  []Coumadin  []Hep SQ  []SCDs  []H2B/PPI    Disposition:  []Home w/ Family   []HH PT,OT,RN   []SNF/LTC   []SAH/Rehab    Ancillary Serices:   [] PT     []OT      []SW      []Nut      []HH ________________________________________________________________________  LABS:  Recent Labs      10/16/17   0329  10/15/17   0329   WBC  5.2  8.0   HGB  12.8  12.9   HCT  36.8  37.2   PLT  146*  149*     Recent Labs      10/16/17   0329  10/15/17   0329  10/14/17   0538   NA  140  139  143   K  3.3*  3.5  3.5   CL  106  107  111*   CO2  26  23  23   BUN  13  16  15   CREA  0.99  1.13  1.07   GLU  104*  90  91   CA  8.7  8.8  8.7   MG  1.9  1.8  1.8   PHOS  2.7  2.1*  2.0*     Recent Labs      10/16/17   0329  10/15/17   0329  10/14/17   0538   SGOT  56*  83*  190*   ALT  148*  212*  329*   AP  182*  163*  156*   TBILI  1.1*  1.7*  2.6*   TP  6.2*  6.2*  6.0*   ALB  2.9*  3.0*  2.9*   GLOB  3.3  3.2  3.1   LPSE  250  383  2271*     No results for input(s): INR, PTP, APTT in the last 72 hours. No lab exists for component: INREXT   No results for input(s): FE, TIBC, PSAT, FERR in the last 72 hours. No results for input(s): PH, PCO2, PO2 in the last 72 hours. No results for input(s): CPK, CKMB in the last 72 hours.     No lab exists for component: TROPONINI  Lab Results   Component Value Date/Time    Glucose (POC) 101 10/16/2017 05:49 AM    Glucose (POC) 82 10/15/2017 11:58 PM    Glucose (POC) 149 10/15/2017 04:14 PM    Glucose (POC) 87 10/15/2017 11:28 AM    Glucose (POC) 90 10/15/2017 07:23 AM       MEDICATIONS:  Current Facility-Administered Medications   Medication Dose Route Frequency    ceFAZolin (ANCEF) 2g in 100 ml 0.9% NS IVPB  2-3 g IntraVENous ON CALL TO OR    lactated Ringers infusion  150 mL/hr IntraVENous CONTINUOUS    HYDROmorphone (DILAUDID) injection 1 mg  1 mg IntraVENous Q4H PRN    sodium chloride (NS) flush 5-10 mL  5-10 mL IntraVENous Q8H    sodium chloride (NS) flush 5-10 mL  5-10 mL IntraVENous PRN    acetaminophen (TYLENOL) tablet 650 mg  650 mg Oral Q6H PRN    ondansetron (ZOFRAN ODT) tablet 4 mg  4 mg Oral Q6H PRN    insulin lispro (HUMALOG) injection   SubCUTAneous Q6H    glucose chewable tablet 16 g  4 Tab Oral PRN    dextrose (D50W) injection syrg 12.5-25 g  12.5-25 g IntraVENous PRN    glucagon (GLUCAGEN) injection 1 mg  1 mg IntraMUSCular PRN    hydrALAZINE (APRESOLINE) 20 mg/mL injection 10 mg  10 mg IntraVENous Q6H PRN    albuterol (PROVENTIL VENTOLIN) nebulizer solution 2.5 mg  2.5 mg Nebulization Q6H PRN    cholecalciferol (VITAMIN D3) tablet 1,000 Units  1,000 Units Oral DAILY    influenza vaccine 2017-18 (3 yrs+)(PF) (FLUZONE QUAD/FLUARIX QUAD) injection 0.5 mL  0.5 mL IntraMUSCular PRIOR TO DISCHARGE

## 2017-10-16 NOTE — PROGRESS NOTES
Pt's Hr at rest, and while awake, running high 30's- mid 40's. Dr. Eduardo Shelby paged and updated on pt's status. No new orders received.

## 2017-10-17 LAB
ALBUMIN SERPL-MCNC: 2.6 G/DL (ref 3.5–5)
ALBUMIN/GLOB SERPL: 0.8 {RATIO} (ref 1.1–2.2)
ALP SERPL-CCNC: 251 U/L (ref 45–117)
ALT SERPL-CCNC: 157 U/L (ref 12–78)
ANION GAP SERPL CALC-SCNC: 9 MMOL/L (ref 5–15)
AST SERPL-CCNC: 128 U/L (ref 15–37)
BASOPHILS # BLD: 0 K/UL (ref 0–0.1)
BASOPHILS NFR BLD: 0 % (ref 0–1)
BILIRUB SERPL-MCNC: 2.9 MG/DL (ref 0.2–1)
BUN SERPL-MCNC: 11 MG/DL (ref 6–20)
BUN/CREAT SERPL: 10 (ref 12–20)
CALCIUM SERPL-MCNC: 8.4 MG/DL (ref 8.5–10.1)
CHLORIDE SERPL-SCNC: 107 MMOL/L (ref 97–108)
CO2 SERPL-SCNC: 26 MMOL/L (ref 21–32)
CREAT SERPL-MCNC: 1.05 MG/DL (ref 0.7–1.3)
EOSINOPHIL # BLD: 0 K/UL (ref 0–0.4)
EOSINOPHIL NFR BLD: 0 % (ref 0–7)
ERYTHROCYTE [DISTWIDTH] IN BLOOD BY AUTOMATED COUNT: 13.2 % (ref 11.5–14.5)
GLOBULIN SER CALC-MCNC: 3.1 G/DL (ref 2–4)
GLUCOSE BLD STRIP.AUTO-MCNC: 116 MG/DL (ref 65–100)
GLUCOSE BLD STRIP.AUTO-MCNC: 118 MG/DL (ref 65–100)
GLUCOSE BLD STRIP.AUTO-MCNC: 121 MG/DL (ref 65–100)
GLUCOSE BLD STRIP.AUTO-MCNC: 123 MG/DL (ref 65–100)
GLUCOSE SERPL-MCNC: 106 MG/DL (ref 65–100)
HCT VFR BLD AUTO: 37.7 % (ref 36.6–50.3)
HGB BLD-MCNC: 12.6 G/DL (ref 12.1–17)
LIPASE SERPL-CCNC: 2976 U/L (ref 73–393)
LYMPHOCYTES # BLD: 0.6 K/UL (ref 0.8–3.5)
LYMPHOCYTES NFR BLD: 9 % (ref 12–49)
MAGNESIUM SERPL-MCNC: 1.9 MG/DL (ref 1.6–2.4)
MCH RBC QN AUTO: 29.5 PG (ref 26–34)
MCHC RBC AUTO-ENTMCNC: 33.4 G/DL (ref 30–36.5)
MCV RBC AUTO: 88.3 FL (ref 80–99)
MONOCYTES # BLD: 0.5 K/UL (ref 0–1)
MONOCYTES NFR BLD: 8 % (ref 5–13)
NEUTS SEG # BLD: 5.2 K/UL (ref 1.8–8)
NEUTS SEG NFR BLD: 83 % (ref 32–75)
PHOSPHATE SERPL-MCNC: 3.6 MG/DL (ref 2.6–4.7)
PLATELET # BLD AUTO: 156 K/UL (ref 150–400)
POTASSIUM SERPL-SCNC: 3.7 MMOL/L (ref 3.5–5.1)
PROT SERPL-MCNC: 5.7 G/DL (ref 6.4–8.2)
RBC # BLD AUTO: 4.27 M/UL (ref 4.1–5.7)
SERVICE CMNT-IMP: ABNORMAL
SODIUM SERPL-SCNC: 142 MMOL/L (ref 136–145)
WBC # BLD AUTO: 6.4 K/UL (ref 4.1–11.1)

## 2017-10-17 PROCEDURE — 74011250636 HC RX REV CODE- 250/636: Performed by: INTERNAL MEDICINE

## 2017-10-17 PROCEDURE — 74011250637 HC RX REV CODE- 250/637: Performed by: SURGERY

## 2017-10-17 PROCEDURE — 84100 ASSAY OF PHOSPHORUS: CPT | Performed by: GENERAL ACUTE CARE HOSPITAL

## 2017-10-17 PROCEDURE — 83690 ASSAY OF LIPASE: CPT | Performed by: GENERAL ACUTE CARE HOSPITAL

## 2017-10-17 PROCEDURE — 82962 GLUCOSE BLOOD TEST: CPT

## 2017-10-17 PROCEDURE — 83735 ASSAY OF MAGNESIUM: CPT | Performed by: GENERAL ACUTE CARE HOSPITAL

## 2017-10-17 PROCEDURE — 65270000029 HC RM PRIVATE

## 2017-10-17 PROCEDURE — 74011250637 HC RX REV CODE- 250/637: Performed by: HOSPITALIST

## 2017-10-17 PROCEDURE — 88304 TISSUE EXAM BY PATHOLOGIST: CPT | Performed by: SURGERY

## 2017-10-17 PROCEDURE — 74011250636 HC RX REV CODE- 250/636: Performed by: SURGERY

## 2017-10-17 PROCEDURE — 80053 COMPREHEN METABOLIC PANEL: CPT | Performed by: GENERAL ACUTE CARE HOSPITAL

## 2017-10-17 PROCEDURE — 85025 COMPLETE CBC W/AUTO DIFF WBC: CPT | Performed by: GENERAL ACUTE CARE HOSPITAL

## 2017-10-17 PROCEDURE — 74011250636 HC RX REV CODE- 250/636: Performed by: GENERAL ACUTE CARE HOSPITAL

## 2017-10-17 PROCEDURE — 36415 COLL VENOUS BLD VENIPUNCTURE: CPT | Performed by: GENERAL ACUTE CARE HOSPITAL

## 2017-10-17 RX ORDER — INSULIN LISPRO 100 [IU]/ML
INJECTION, SOLUTION INTRAVENOUS; SUBCUTANEOUS
Status: DISCONTINUED | OUTPATIENT
Start: 2017-10-17 | End: 2017-10-19 | Stop reason: HOSPADM

## 2017-10-17 RX ORDER — HYDROMORPHONE HYDROCHLORIDE 2 MG/ML
.5-1 INJECTION, SOLUTION INTRAMUSCULAR; INTRAVENOUS; SUBCUTANEOUS
Status: DISCONTINUED | OUTPATIENT
Start: 2017-10-17 | End: 2017-10-18

## 2017-10-17 RX ORDER — SODIUM CHLORIDE 9 MG/ML
100 INJECTION, SOLUTION INTRAVENOUS CONTINUOUS
Status: DISCONTINUED | OUTPATIENT
Start: 2017-10-17 | End: 2017-10-19

## 2017-10-17 RX ADMIN — Medication 10 ML: at 06:33

## 2017-10-17 RX ADMIN — CEFAZOLIN 2 G: 10 INJECTION, POWDER, FOR SOLUTION INTRAVENOUS; PARENTERAL at 00:05

## 2017-10-17 RX ADMIN — Medication 10 ML: at 13:00

## 2017-10-17 RX ADMIN — HYDROCODONE BITARTRATE AND ACETAMINOPHEN 1 TABLET: 5; 325 TABLET ORAL at 22:43

## 2017-10-17 RX ADMIN — VITAMIN D, TAB 1000IU (100/BT) 1000 UNITS: 25 TAB at 08:46

## 2017-10-17 RX ADMIN — HYDROMORPHONE HYDROCHLORIDE 0.5 MG: 2 INJECTION INTRAMUSCULAR; INTRAVENOUS; SUBCUTANEOUS at 06:20

## 2017-10-17 RX ADMIN — Medication 10 ML: at 21:25

## 2017-10-17 RX ADMIN — SODIUM CHLORIDE 100 ML/HR: 900 INJECTION, SOLUTION INTRAVENOUS at 19:55

## 2017-10-17 RX ADMIN — DOCUSATE SODIUM 100 MG: 100 CAPSULE, LIQUID FILLED ORAL at 17:03

## 2017-10-17 RX ADMIN — DOCUSATE SODIUM 100 MG: 100 CAPSULE, LIQUID FILLED ORAL at 08:46

## 2017-10-17 RX ADMIN — CEFAZOLIN 2 G: 10 INJECTION, POWDER, FOR SOLUTION INTRAVENOUS; PARENTERAL at 06:31

## 2017-10-17 RX ADMIN — HYDROMORPHONE HYDROCHLORIDE 0.5 MG: 2 INJECTION INTRAMUSCULAR; INTRAVENOUS; SUBCUTANEOUS at 03:45

## 2017-10-17 NOTE — PROGRESS NOTES
Follow up visit in 2184 as requested by patient prior to surgery. Found pt surrounded by three friends from the [facility] were pt lives. Pt appeared to be in positive spirits and enjoying visit. Briefly checked in with pt who informed that surgery had gone well. Pronounced continued blessings celebrating with pt. Thanked friends for visit and stepped away to allow for visitation to continue. CRISTY Blancas. Britney Sellers

## 2017-10-17 NOTE — PROGRESS NOTES
General Surgery End of Shift Nursing Note    Bedside shift change report given to Tayla Mckay (oncoming nurse) by Génesis Briceño (offgoing nurse). Report included the following information SBAR, Kardex, Procedure Summary, Intake/Output, MAR, Recent Results and Cardiac Rhythm NSR. Shift worked:   [id number]   Significant changes during shift:    -   Non-emergent issues for physician to address:   -     Number times ambulated in hallway past shift: 0    Number of times OOB to chair past shift: 1    Pain Management:  Current medication: dilaudid  Patient states pain is manageable on current pain medication: YES    GI:    Current diet:  DIET REGULAR 50GM Fat    Tolerating current diet: YES  Passing flatus: YES  Last Bowel Movement: yesterday   Appearance: wnl    Respiratory:    Incentive Spirometer at bedside: YES  Patient instructed on use: YES    Patient Safety:    Falls Score: 2  Bed Alarm On? Not applicable  Sitter?  Not applicable    Verlee Beverage

## 2017-10-17 NOTE — PROGRESS NOTES
MANDEEP contacted Ms. Garrick Kanner with [facility] in order to inform of potential discharge of pt today so that transportation can be arranged. CM left HIPAA compliant voicemail requesting follow-up. SW will continue to follow to ensure discharge needs are met. 11:56AM  Garrick Kanner [facility]) returned MANDEEP's message in order to confirm pt's need for transportation arrangements. MANDEEP provided potential discharge update for tomorrow 10/18/17. Ms. Jose Meng confirmed and will make arrangements. SW will continue to follow to ensure any additional discharge needs are met.     Joey Fitzpatrick, MSW  Ext [phone]    [place], MSW, [address] [facility]   [phone]

## 2017-10-17 NOTE — PROGRESS NOTES
General Surgery End of Shift Nursing Note    Bedside shift change report given to Corinne Grullon RN (oncoming nurse) by Elis Nguyen. Osei Ortiz RN (offgoing nurse). Report included the following information SBAR, Kardex, Procedure Summary, Intake/Output, MAR and Recent Results. Shift worked:   7a to 3p   Significant changes during shift:    none   Non-emergent issues for physician to address:   none     Number times ambulated in hallway past shift: in room    Number of times OOB to chair past shift: 2    Pain Management:  Current medication: none  Patient states pain is manageable on current pain medication: YES    GI:    Current diet:  DIET REGULAR 50GM Fat  DIET NPO    Tolerating current diet: YES  Passing flatus: YES  Last Bowel Movement: yesterday   Appearance: loose    Respiratory:    Incentive Spirometer at bedside: YES  Patient instructed on use: YES    Patient Safety:    Falls Score: 1  Bed Alarm On? Yes  Sitter?  No    Janey Milton RN

## 2017-10-17 NOTE — OP NOTES
[address], [facility]   OP NOTE       Name:  Elijah Leach   MR#:  [id number]   :  1935   Account #:  [de-identified]    Surgery Date:  10/16/2017   Date of Adm:  10/13/2017       PREOPERATIVE DIAGNOSES   1. Gallstone pancreatitis. 2. Chronic cholecystitis. POSTOPERATIVE DIAGNOSES   1. Gallstone pancreatitis. 2. Chronic cholecystitis. 3. Choledocholithiasis. SURGEON: Sheryl Alvarez MD    PROCEDURES PERFORMED   1. Laparoscopic cholecystectomy. 2. Intraoperative cholangiogram with intraoperative interpretation. 3. Laparoscopic common bile duct exploration. ANESTHESIA: General plus local.    ESTIMATED BLOOD LOSS: Minimal.    FINDINGS: The patient was noted to have an edematous gallbladder   containing stones and sludge. Intraoperative cholangiogram was   obtained. Radiologist not present during the case. Intraoperative   interpretation revealed filling of the common bile duct, normal length   cystic duct and all proximal and distal structures. There was some flow   of contrast in the duodenum, however, there were a few small   stones/sludge in the distal duct noted on initial cholangiogram. After   common bile duct exploration as noted below, final cholangiogram   showed free flow of contrast duodenum with no remaining filling   defects. The liver otherwise appeared unremarkable. Remainder   of abdominal cavity was normal.    SPECIMENS REMOVED: Gallbladder. DRAINS: None. COMPLICATIONS: None. INDICATION: The patient is an 19-year-old gentleman who I initially   met in 2014 with symptomatic cholelithiasis. We had scheduled him for   surgery; however, he was feeling better and therefore cancelled the   surgery. He now presents with gallstone pancreatitis. His pancreatitis   is resolving, consistent with passage of stone/sludge and he is being   brought to the operating room today for removal of his gallbladder.    Risks, benefits and alternatives were discussed. Consent form was   placed in chart. DESCRIPTION OF PROCEDURE: After satisfactory induction of   general anesthesia, the patient was kept in the supine position. The   patient's abdomen was prepped and draped sterilely, infused skin with   local anesthetic, a small incision made and a supraumbilical 5-mm   optical entry trocar was placed intra-abdominal and   pneumoperitoneum was achieved. The abdomen was explored with   findings noted above. A 12 mm port placed in the patient's epigastrium   and a 5-mm instrument was placed in the right abdomen through   a small stab incision. The gallbladder was retracted superior and laterally, reflection of   peritoneum was brought down, exposing Calot triangle. The cystic duct   was  from surrounding tissues with a critical view obtained   including the cystic duct, common bile duct junction and the cystic artery   was identified as well. A single clip was placed in the junction of the   cystic duct and gallbladder, and clips placed on some lymphatic   tissues as well as the cystic artery as well. A small ductotomy was   created. Some sludge was milked from the cystic duct. Intraoperative cholangiogram was obtained with findings as   noted above. There was a small filling defect noted in the distal   common bile duct. I asked Anesthesia to administer 1 mg of glucagon   intravenously and the common bile duct was flushed. One or 2 small   filling defects remained in the distal duct as noted on magnified views   of the ampulla consistent with some sludge. A   4-Telugu Hawa catheter was passed through the cystic duct into the   distal common bile duct and into the duodenum. The common duct   was swept with the balloon and some additional sludge obtained. Sweeps were performed until no additional sludge was able to be   obtained.  Final cholangiogram revealed filling of the ductal structures   without any remaining filling defects. Three clips were placed on the   cystic duct just distal to the ductotomy and the duct and artery were   transected. The spatula tipped Bovie was used to separate the   gallbladder from its bed in the liver. Once fully freed, irrigation applied   to the right upper quadrant, final irrigation ran clear, complete   hemostasis was noted, 10 mL of 0.5% Marcaine with epinephrine was   sprayed over the dome of the liver. The gallbladder was retrieved   through the epigastric port site using an Endopouch. The remaining   trocar was removed under visualization and the pneumoperitoneum   was allowed to escape. A figure-of-eight 0 Vicryl suture was used to   close the epigastric fascial defect, followed by additional Vicryl in the   soft tissue, and finally closure of all skin incisions with subcuticular   Monocryl and Dermabond. The patient remained stable during my   presence in the operating room.         MD DEONNA Alcaraz / SERENITY   D:  10/16/2017   17:38   T:  10/16/2017   23:54   Job #:  [id number]

## 2017-10-17 NOTE — PROGRESS NOTES
GI Progress Note Simranvicky Sparks)  NAME:Wilber Johnson :1935 YFB:[id number]   ATTG: Suresh Macias MD  PCP: Rafael Mariee MD  Date/Time:  10/17/2017 1035   Assessment:   · GS pancreatitis-  lipase elevated following balloon clearance of CBD stone   · Elevated LFTs- increased T.Bili and transaminase c/w pancreatitis and manipulation at time of clearance of CBD stone  · Abdominal pain- minimal; at incisions only      Plan:   · Consider making NPO in setting of pancreatitis until numbers improve  · Will make NPO after MN in event that needs an ERCP  · Add IVF  · IV narcotic analgesia PRN  · IV antiemetics PRN     Subjective:   Discussed with RN events overnight. Operative note reviewed with IOC reviewed noting FD, cleared with balloon. Complaint Y/N Description   Abdominal Pain n    Hematemesis n    Hematochezia n    Melena n    Constipation n    Diarrhea n    Dyspepsia n    Dysphagia n    Jaundiced n    Nausea/vomiting n      Review of Systems:  Symptom Y/N Comments  Symptom Y/N Comments   Fever/Chills n   Chest Pain n    Cough n   Headaches n    Sputum n   Joint Pain n    SOB/ADRIAN n   Pruritis/Rash n    Tolerating Diet  Reg  Other       Could NOT obtain due to:      Objective:   VITALS:   Last 24hrs VS reviewed since prior progress note. Most recent are:  Visit Vitals    /85    Pulse (!) 59    Temp 98.8 °F (37.1 °C)    Resp 16    Ht 5' 3\" (1.6 m)    Wt 74.4 kg (164 lb)    SpO2 92%    BMI 29.05 kg/m2       Intake/Output Summary (Last 24 hours) at 10/17/17 1035  Last data filed at 10/17/17 0843   Gross per 24 hour   Intake             2975 ml   Output               10 ml   Net             2965 ml     PHYSICAL EXAM:  General: WD, WN. Alert, cooperative, no acute distress    HEENT: NC, Atraumatic. PERRL. Anicteric sclerae. Lungs:  CTA Bilaterally. No Wheezing/Rhonchi/Rales.   Heart:  Regular  rhythm,  No murmur/Rub/Gallops  Abdomen: Soft, Non distended, +BS; min incisional T  Extremities: No c/c/e  Neurologic:  CN 2-12 gi, A/O X 3. No acute neurological distress   Psych:   Good insight. Not anxious nor agitated. Lab and Radiology Data Reviewed: (see below)  [address] 10/16/17:  demonstrates a filling defect in the distal CBD. A balloon catheter was passed. The last image demonstrates some runoff of contrast into the small bowel. Medications Reviewed: (see below)  PMH/SH reviewed - no change compared to H&P  ________________________________________________________________________  Total time spent with patient: 15 minutes ________________________________________________________________________  Care Plan discussed with:  Patient y   Family     RN y              Consultant:       Shereen Umaña MD     Procedures: see electronic medical records for all procedures/Xrays and details which were not copied into this note but were reviewed prior to creation of Plan. LABS:  Recent Labs      10/17/17   0600  10/16/17   0329   WBC  6.4  5.2   HGB  12.6  12.8   HCT  37.7  36.8   PLT  156  146*     Recent Labs      10/17/17   0600  10/16/17   0329  10/15/17   0329   NA  142  140  139   K  3.7  3.3*  3.5   CL  107  106  107   CO2  26  26  23   BUN  11  13  16   CREA  1.05  0.99  1.13   GLU  106*  104*  90   CA  8.4*  8.7  8.8   MG  1.9  1.9  1.8   PHOS  3.6  2.7  2.1*     Recent Labs      10/17/17   0600  10/16/17   0329  10/15/17   0329   SGOT  128*  56*  83*   AP  251*  182*  163*   TP  5.7*  6.2*  6.2*   ALB  2.6*  2.9*  3.0*   GLOB  3.1  3.3  3.2   LPSE  2976*  250  383     No results for input(s): INR, PTP, APTT in the last 72 hours. No lab exists for component: INREXT, INREXT   No results for input(s): FE, TIBC, PSAT, FERR in the last 72 hours. No results found for: FOL, RBCF  No results for input(s): PH, PCO2, PO2 in the last 72 hours. No results for input(s): CPK, CKMB in the last 72 hours.     No lab exists for component: TROPONINI  Lab Results   Component Value Date/Time    Color DARK YELLOW 10/13/2017 06:12 AM    Appearance CLEAR 10/13/2017 06:12 AM    Specific gravity 1.015 10/13/2017 06:12 AM    pH (UA) 5.5 10/13/2017 06:12 AM    Protein NEGATIVE  10/13/2017 06:12 AM    Glucose NEGATIVE  10/13/2017 06:12 AM    Ketone NEGATIVE  10/13/2017 06:12 AM    Bilirubin NEGATIVE  10/13/2017 06:12 AM    Urobilinogen 2.0 10/13/2017 06:12 AM    Nitrites NEGATIVE  10/13/2017 06:12 AM    Leukocyte Esterase NEGATIVE  10/13/2017 06:12 AM    Epithelial cells FEW 10/13/2017 06:12 AM    Bacteria NEGATIVE  10/13/2017 06:12 AM    WBC 0-4 10/13/2017 06:12 AM    RBC 0-5 10/13/2017 06:12 AM       MEDICATIONS:  Current Facility-Administered Medications   Medication Dose Route Frequency    HYDROmorphone (DILAUDID) injection 0.5-1 mg  0.5-1 mg IntraVENous Q2H PRN    acetaminophen (TYLENOL) tablet 650 mg  650 mg Oral Q4H PRN    HYDROcodone-acetaminophen (NORCO) 5-325 mg per tablet 1-2 Tab  1-2 Tab Oral Q4H PRN    ondansetron (ZOFRAN) injection 4 mg  4 mg IntraVENous Q4H PRN    docusate sodium (COLACE) capsule 100 mg  100 mg Oral BID    sodium chloride (NS) flush 5-10 mL  5-10 mL IntraVENous Q8H    sodium chloride (NS) flush 5-10 mL  5-10 mL IntraVENous PRN    acetaminophen (TYLENOL) tablet 650 mg  650 mg Oral Q6H PRN    ondansetron (ZOFRAN ODT) tablet 4 mg  4 mg Oral Q6H PRN    insulin lispro (HUMALOG) injection   SubCUTAneous Q6H    glucose chewable tablet 16 g  4 Tab Oral PRN    dextrose (D50W) injection syrg 12.5-25 g  12.5-25 g IntraVENous PRN    glucagon (GLUCAGEN) injection 1 mg  1 mg IntraMUSCular PRN    hydrALAZINE (APRESOLINE) 20 mg/mL injection 10 mg  10 mg IntraVENous Q6H PRN    albuterol (PROVENTIL VENTOLIN) nebulizer solution 2.5 mg  2.5 mg Nebulization Q6H PRN    cholecalciferol (VITAMIN D3) tablet 1,000 Units  1,000 Units Oral DAILY    influenza vaccine 2017-18 (3 yrs+)(PF) (FLUZONE QUAD/FLUARIX QUAD) injection 0.5 mL  0.5 mL IntraMUSCular PRIOR TO DISCHARGE

## 2017-10-17 NOTE — PROGRESS NOTES
Hospitalist Progress Note    NAME: Keith Rowe   :  1935   MRN:  [id number]       Assessment / Plan:  Gallstone pancreatitis, POA lipase >3000  Elevated LFTs and bilirubin, POA, secondary to above  S/p laparoscopic cholecystectomy with IOC and CBD exploration 10/16  -MRI \"Cholelithiasis. Common duct is normal caliber. No stones are identified  within the duct. There is a small amount of nonspecific fluid adjacent to the  duodenum/gallbladder neck\"  -CBD stone on IOC  -IV antiemetic, IV pain med. -lipase up to 2976 following surgery yesterday. Tbili, AST, ALT up as well.  -Start IVF, NPO at midnight in case of ERCP. -GI and surgery following, recs appreciated. HTN  -Continue holding BP meds, IV hydralazine prn    hyperparathyroidism  vitamin D deficiency  -Continue Vitamin D    Hypophosphatemia, resolved    Body mass index is 29.23 kg/(m^2).     Code status: Full  Prophylaxis: SCD's - switched from Lovenox  Recommended Disposition: Home w/Family     Subjective:     Chief Complaint / Reason for Physician Visit  Tolerating PO. Denies abdominal pain. No BM. Shannan Angel Discussed with RN events overnight. Review of Systems:  Symptom Y/N Comments  Symptom Y/N Comments   Fever/Chills n   Chest Pain n    Poor Appetite    Edema     Cough n   Abdominal Pain n    Sputum    Joint Pain     SOB/ADRIAN n   Pruritis/Rash     Nausea/vomit n   Tolerating PT/OT     Diarrhea    Tolerating Diet y    Constipation    Other  Kept NPO     Could NOT obtain due to:      Objective:     VITALS:   Last 24hrs VS reviewed since prior progress note.  Most recent are:  Patient Vitals for the past 24 hrs:   Temp Pulse Resp BP SpO2   10/17/17 1600 97.6 °F (36.4 °C) 63 16 185/85 92 %   10/17/17 1100 98.6 °F (37 °C) 60 16 177/80 92 %   10/17/17 0730 98.8 °F (37.1 °C) (!) 59 16 175/85 92 %   10/17/17 0338 98.6 °F (37 °C) 61 20 178/88 95 %   10/16/17 1930 99 °F (37.2 °C) (!) 59 21 150/58 96 %   10/16/17 1915 - (!) 58 17 149/58 96 % Intake/Output Summary (Last 24 hours) at 10/17/17 1914  Last data filed at 10/17/17 1401   Gross per 24 hour   Intake             1610 ml   Output                0 ml   Net             1610 ml        PHYSICAL EXAM:  General:                    WD, WN. Alert, cooperative, no acute distress    EENT:                       EOMI. Anicteric sclerae. MMM  Resp:                        CTA bilaterally, no wheezing or rales.  No accessory muscle use  CV:                            Regular  rhythm,  No edema  GI:                              Soft, non distended, incisional tenderness, BS+  Neurologic:                Alert and oriented X 3, normal speech,  No focal deficits  Psych:                       Good insight. Not anxious nor agitated  Skin:                           No rashes.  No jaundice    Reviewed most current lab test results and cultures  YES  Reviewed most current radiology test results   YES  Review and summation of old records today    NO  Reviewed patient's current orders and MAR    YES  PMH/ reviewed - no change compared to H&P  ________________________________________________________________________  Care Plan discussed with:    Comments   Patient y    Family      RN y    Care Manager     Consultant                        Multidiciplinary team rounds were held today with , nursing, pharmacist and clinical coordinator. Patient's plan of care was discussed; medications were reviewed and discharge planning was addressed.      ________________________________________________________________________  Total NON critical care TIME:  30   Minutes    Total CRITICAL CARE TIME Spent:   Minutes non procedure based      Comments   >50% of visit spent in counseling and coordination of care     ________________________________________________________________________  Robert Brady MD     Procedures: see electronic medical records for all procedures/Xrays and details which were not copied into this note but were reviewed prior to creation of Plan. LABS:  I reviewed today's most current labs and imaging studies.   Pertinent labs include:  Recent Labs      10/17/17   0600  10/16/17   0329  10/15/17   0329   WBC  6.4  5.2  8.0   HGB  12.6  12.8  12.9   HCT  37.7  36.8  37.2   PLT  156  146*  149*     Recent Labs      10/17/17   0600  10/16/17   0329  10/15/17   0329   NA  142  140  139   K  3.7  3.3*  3.5   CL  107  106  107   CO2  26  26  23   GLU  106*  104*  90   BUN  11  13  16   CREA  1.05  0.99  1.13   CA  8.4*  8.7  8.8   MG  1.9  1.9  1.8   PHOS  3.6  2.7  2.1*   ALB  2.6*  2.9*  3.0*   TBILI  2.9*  1.1*  1.7*   SGOT  128*  56*  83*   ALT  157*  148*  212*       Signed: Mine Reevse MD

## 2017-10-17 NOTE — PERIOP NOTES
TRANSFER - OUT REPORT:    Verbal report given to Jose De Jesus Cortez RN(name) on Elsie Newberry  being transferred to [facility](unit) for routine post - op       Report consisted of patients Situation, Background, Assessment and   Recommendations(SBAR). Information from the following report(s) SBAR, Kardex, OR Summary, Intake/Output, MAR and Recent Results was reviewed with the receiving nurse. Opportunity for questions and clarification was provided.       Patient transported with:   O2 @ 4 liters  Registered Nurse

## 2017-10-17 NOTE — PROGRESS NOTES
Admit Date: 10/13/2017  POD 1 Day Post-Op  Status/Post:  Procedure(s):  CHOLECYSTECTOMY LAPAROSCOPIC WITH GRAMS WITH COMMON BILE DUCT EXPLORATION    Assessment:     Principal Problem:    Acute pancreatitis (10/13/2017)    Active Problems:    Gallstones (2014)        Plan/Recommendations/Medical Decision Making:     Had more stones/sludge in CBD, successfully removed  LFTs and Lipase up some  Currently sitting up and feeling ok    Keep one more night and repeat labs in AM, home tomorrow if looks good      -------------------------------------------------------------------------------------------------------------------      Subjective:     Patient has no new complaints. no nausea      Objective:     Blood pressure 175/85, pulse (!) 59, temperature 98.8 °F (37.1 °C), resp. rate 16, height 5' 3\" (1.6 m), weight 74.4 kg (164 lb), SpO2 92 %. Temp (24hrs), Av.5 °F (36.9 °C), Min:97.9 °F (36.6 °C), Max:99 °F (37.2 °C)      Physical Exam:   Abdominal exam: soft  non-distended  appropriatly tender.   Wound: clean, dry, no drainage    Labs:   Recent Results (from the past 24 hour(s))   EKG, 12 LEAD, INITIAL    Collection Time: 10/16/17 10:16 AM   Result Value Ref Range    Ventricular Rate 53 BPM    Atrial Rate 53 BPM    P-R Interval 152 ms    QRS Duration 80 ms    Q-T Interval 440 ms    QTC Calculation (Bezet) 412 ms    Calculated P Axis 31 degrees    Calculated R Axis 47 degrees    Calculated T Axis 41 degrees    Diagnosis       Sinus bradycardia  Nonspecific ST abnormality  When compared with ECG of 17-AUG-2014 13:16,  No significant change was found  Confirmed by Kris Lomax ([id number]) on 10/16/2017 1:06:20 PM     GLUCOSE, POC    Collection Time: 10/16/17 12:05 PM   Result Value Ref Range    Glucose (POC) 112 (H) 65 - 100 mg/dL    Performed by Milagros Castillo (PCT)    GLUCOSE, POC    Collection Time: 10/16/17  8:33 PM   Result Value Ref Range    Glucose (POC) 116 (H) 65 - 100 mg/dL    Performed by Mitesh Willis (PCT)    CBC WITH AUTOMATED DIFF    Collection Time: 10/17/17  6:00 AM   Result Value Ref Range    WBC 6.4 4.1 - 11.1 K/uL    RBC 4.27 4. 10 - 5.70 M/uL    HGB 12.6 12.1 - 17.0 g/dL    HCT 37.7 36.6 - 50.3 %    MCV 88.3 80.0 - 99.0 FL    MCH 29.5 26.0 - 34.0 PG    MCHC 33.4 30.0 - 36.5 g/dL    RDW 13.2 11.5 - 14.5 %    PLATELET 825 829 - 934 K/uL    NEUTROPHILS 83 (H) 32 - 75 %    LYMPHOCYTES 9 (L) 12 - 49 %    MONOCYTES 8 5 - 13 %    EOSINOPHILS 0 0 - 7 %    BASOPHILS 0 0 - 1 %    ABS. NEUTROPHILS 5.2 1.8 - 8.0 K/UL    ABS. LYMPHOCYTES 0.6 (L) 0.8 - 3.5 K/UL    ABS. MONOCYTES 0.5 0.0 - 1.0 K/UL    ABS. EOSINOPHILS 0.0 0.0 - 0.4 K/UL    ABS. BASOPHILS 0.0 0.0 - 0.1 K/UL   PHOSPHORUS    Collection Time: 10/17/17  6:00 AM   Result Value Ref Range    Phosphorus 3.6 2.6 - 4.7 MG/DL   MAGNESIUM    Collection Time: 10/17/17  6:00 AM   Result Value Ref Range    Magnesium 1.9 1.6 - 2.4 mg/dL   METABOLIC PANEL, COMPREHENSIVE    Collection Time: 10/17/17  6:00 AM   Result Value Ref Range    Sodium 142 136 - 145 mmol/L    Potassium 3.7 3.5 - 5.1 mmol/L    Chloride 107 97 - 108 mmol/L    CO2 26 21 - 32 mmol/L    Anion gap 9 5 - 15 mmol/L    Glucose 106 (H) 65 - 100 mg/dL    BUN 11 6 - 20 MG/DL    Creatinine 1.05 0.70 - 1.30 MG/DL    BUN/Creatinine ratio 10 (L) 12 - 20      GFR est AA >60 >60 ml/min/1.73m2    GFR est non-AA >60 >60 ml/min/1.73m2    Calcium 8.4 (L) 8.5 - 10.1 MG/DL    Bilirubin, total 2.9 (H) 0.2 - 1.0 MG/DL    ALT (SGPT) 157 (H) 12 - 78 U/L    AST (SGOT) 128 (H) 15 - 37 U/L    Alk.  phosphatase 251 (H) 45 - 117 U/L    Protein, total 5.7 (L) 6.4 - 8.2 g/dL    Albumin 2.6 (L) 3.5 - 5.0 g/dL    Globulin 3.1 2.0 - 4.0 g/dL    A-G Ratio 0.8 (L) 1.1 - 2.2     LIPASE    Collection Time: 10/17/17  6:00 AM   Result Value Ref Range    Lipase 2976 (H) 73 - 393 U/L   GLUCOSE, POC    Collection Time: 10/17/17  7:58 AM   Result Value Ref Range    Glucose (POC) 123 (H) 65 - 100 mg/dL    Performed by Nati Bravo (PCT) Data Review

## 2017-10-18 ENCOUNTER — ANESTHESIA (OUTPATIENT)
Dept: SURGERY | Age: 82
DRG: 418 | End: 2017-10-18
Payer: MEDICARE

## 2017-10-18 ENCOUNTER — ANESTHESIA EVENT (OUTPATIENT)
Dept: SURGERY | Age: 82
DRG: 418 | End: 2017-10-18
Payer: MEDICARE

## 2017-10-18 ENCOUNTER — APPOINTMENT (OUTPATIENT)
Dept: GENERAL RADIOLOGY | Age: 82
DRG: 418 | End: 2017-10-18
Attending: INTERNAL MEDICINE
Payer: MEDICARE

## 2017-10-18 LAB
ALBUMIN SERPL-MCNC: 2.6 G/DL (ref 3.5–5)
ALBUMIN/GLOB SERPL: 0.8 {RATIO} (ref 1.1–2.2)
ALP SERPL-CCNC: 291 U/L (ref 45–117)
ALT SERPL-CCNC: 125 U/L (ref 12–78)
ANION GAP SERPL CALC-SCNC: 5 MMOL/L (ref 5–15)
AST SERPL-CCNC: 101 U/L (ref 15–37)
BASOPHILS # BLD: 0 K/UL (ref 0–0.1)
BASOPHILS NFR BLD: 0 % (ref 0–1)
BILIRUB SERPL-MCNC: 3.7 MG/DL (ref 0.2–1)
BUN SERPL-MCNC: 10 MG/DL (ref 6–20)
BUN/CREAT SERPL: 10 (ref 12–20)
CALCIUM SERPL-MCNC: 8 MG/DL (ref 8.5–10.1)
CHLORIDE SERPL-SCNC: 106 MMOL/L (ref 97–108)
CO2 SERPL-SCNC: 28 MMOL/L (ref 21–32)
CREAT SERPL-MCNC: 1.05 MG/DL (ref 0.7–1.3)
EOSINOPHIL # BLD: 0.1 K/UL (ref 0–0.4)
EOSINOPHIL NFR BLD: 2 % (ref 0–7)
ERYTHROCYTE [DISTWIDTH] IN BLOOD BY AUTOMATED COUNT: 13 % (ref 11.5–14.5)
GLOBULIN SER CALC-MCNC: 3.1 G/DL (ref 2–4)
GLUCOSE BLD STRIP.AUTO-MCNC: 112 MG/DL (ref 65–100)
GLUCOSE BLD STRIP.AUTO-MCNC: 78 MG/DL (ref 65–100)
GLUCOSE BLD STRIP.AUTO-MCNC: 79 MG/DL (ref 65–100)
GLUCOSE BLD STRIP.AUTO-MCNC: 80 MG/DL (ref 65–100)
GLUCOSE BLD STRIP.AUTO-MCNC: 86 MG/DL (ref 65–100)
GLUCOSE BLD STRIP.AUTO-MCNC: 90 MG/DL (ref 65–100)
GLUCOSE BLD STRIP.AUTO-MCNC: 93 MG/DL (ref 65–100)
GLUCOSE SERPL-MCNC: 93 MG/DL (ref 65–100)
HCT VFR BLD AUTO: 38.6 % (ref 36.6–50.3)
HGB BLD-MCNC: 13 G/DL (ref 12.1–17)
LIPASE SERPL-CCNC: 358 U/L (ref 73–393)
LYMPHOCYTES # BLD: 1 K/UL (ref 0.8–3.5)
LYMPHOCYTES NFR BLD: 16 % (ref 12–49)
MCH RBC QN AUTO: 29.7 PG (ref 26–34)
MCHC RBC AUTO-ENTMCNC: 33.7 G/DL (ref 30–36.5)
MCV RBC AUTO: 88.1 FL (ref 80–99)
MONOCYTES # BLD: 0.5 K/UL (ref 0–1)
MONOCYTES NFR BLD: 8 % (ref 5–13)
NEUTS SEG # BLD: 4.7 K/UL (ref 1.8–8)
NEUTS SEG NFR BLD: 74 % (ref 32–75)
PLATELET # BLD AUTO: 139 K/UL (ref 150–400)
POTASSIUM SERPL-SCNC: 3.4 MMOL/L (ref 3.5–5.1)
PROT SERPL-MCNC: 5.7 G/DL (ref 6.4–8.2)
RBC # BLD AUTO: 4.38 M/UL (ref 4.1–5.7)
SERVICE CMNT-IMP: ABNORMAL
SERVICE CMNT-IMP: NORMAL
SODIUM SERPL-SCNC: 139 MMOL/L (ref 136–145)
WBC # BLD AUTO: 6.3 K/UL (ref 4.1–11.1)

## 2017-10-18 PROCEDURE — 80053 COMPREHEN METABOLIC PANEL: CPT | Performed by: SURGERY

## 2017-10-18 PROCEDURE — 74011250636 HC RX REV CODE- 250/636

## 2017-10-18 PROCEDURE — 76210000006 HC OR PH I REC 0.5 TO 1 HR: Performed by: INTERNAL MEDICINE

## 2017-10-18 PROCEDURE — 77030010104 HC SEAL PRT ENDOSC BYRN -B: Performed by: INTERNAL MEDICINE

## 2017-10-18 PROCEDURE — C1874 STENT, COATED/COV W/DEL SYS: HCPCS | Performed by: INTERNAL MEDICINE

## 2017-10-18 PROCEDURE — BF131ZZ FLUOROSCOPY OF GALLBLADDER AND BILE DUCTS USING LOW OSMOLAR CONTRAST: ICD-10-PCS | Performed by: INTERNAL MEDICINE

## 2017-10-18 PROCEDURE — 65270000029 HC RM PRIVATE

## 2017-10-18 PROCEDURE — 74011000250 HC RX REV CODE- 250

## 2017-10-18 PROCEDURE — 74011250637 HC RX REV CODE- 250/637: Performed by: SURGERY

## 2017-10-18 PROCEDURE — 77030011640 HC PAD GRND REM COVD -A: Performed by: INTERNAL MEDICINE

## 2017-10-18 PROCEDURE — 77030007288 HC DEV LOK BILI BSC -A: Performed by: INTERNAL MEDICINE

## 2017-10-18 PROCEDURE — C1769 GUIDE WIRE: HCPCS | Performed by: INTERNAL MEDICINE

## 2017-10-18 PROCEDURE — 85025 COMPLETE CBC W/AUTO DIFF WBC: CPT | Performed by: SURGERY

## 2017-10-18 PROCEDURE — 0FB98ZX EXCISION OF COMMON BILE DUCT, VIA NATURAL OR ARTIFICIAL OPENING ENDOSCOPIC, DIAGNOSTIC: ICD-10-PCS | Performed by: INTERNAL MEDICINE

## 2017-10-18 PROCEDURE — 0F798DZ DILATION OF COMMON BILE DUCT WITH INTRALUMINAL DEVICE, VIA NATURAL OR ARTIFICIAL OPENING ENDOSCOPIC: ICD-10-PCS | Performed by: INTERNAL MEDICINE

## 2017-10-18 PROCEDURE — 36415 COLL VENOUS BLD VENIPUNCTURE: CPT | Performed by: SURGERY

## 2017-10-18 PROCEDURE — 76010000149 HC OR TIME 1 TO 1.5 HR: Performed by: INTERNAL MEDICINE

## 2017-10-18 PROCEDURE — 77030026438 HC STYL ET INTUB CARD -A: Performed by: ANESTHESIOLOGY

## 2017-10-18 PROCEDURE — 77030032490 HC SLV COMPR SCD KNE COVD -B: Performed by: INTERNAL MEDICINE

## 2017-10-18 PROCEDURE — 77030008684 HC TU ET CUF COVD -B: Performed by: ANESTHESIOLOGY

## 2017-10-18 PROCEDURE — 77030018836 HC SOL IRR NACL ICUM -A: Performed by: INTERNAL MEDICINE

## 2017-10-18 PROCEDURE — 74011250636 HC RX REV CODE- 250/636: Performed by: INTERNAL MEDICINE

## 2017-10-18 PROCEDURE — 74330 X-RAY BILE/PANC ENDOSCOPY: CPT

## 2017-10-18 PROCEDURE — 77030021668 HC NEB PREFIL KT VYRM -A

## 2017-10-18 PROCEDURE — 82962 GLUCOSE BLOOD TEST: CPT

## 2017-10-18 PROCEDURE — 77030036933 HC BRSH RX CYTO WREGD BSC -C: Performed by: INTERNAL MEDICINE

## 2017-10-18 PROCEDURE — 77030009038 HC CATH BILI STN RTVR BSC -C: Performed by: INTERNAL MEDICINE

## 2017-10-18 PROCEDURE — 77030018846 HC SOL IRR STRL H20 ICUM -A: Performed by: INTERNAL MEDICINE

## 2017-10-18 PROCEDURE — 74011250636 HC RX REV CODE- 250/636: Performed by: SURGERY

## 2017-10-18 PROCEDURE — 83690 ASSAY OF LIPASE: CPT | Performed by: SURGERY

## 2017-10-18 PROCEDURE — 74011250636 HC RX REV CODE- 250/636: Performed by: HOSPITALIST

## 2017-10-18 PROCEDURE — 74011000250 HC RX REV CODE- 250: Performed by: HOSPITALIST

## 2017-10-18 PROCEDURE — 74011250636 HC RX REV CODE- 250/636: Performed by: ANESTHESIOLOGY

## 2017-10-18 PROCEDURE — 88112 CYTOPATH CELL ENHANCE TECH: CPT | Performed by: HOSPITALIST

## 2017-10-18 PROCEDURE — 76060000033 HC ANESTHESIA 1 TO 1.5 HR: Performed by: INTERNAL MEDICINE

## 2017-10-18 DEVICE — STENT SYSTEM RMV
Type: IMPLANTABLE DEVICE | Site: BILE DUCT | Status: FUNCTIONAL
Brand: WALLFLEX BILIARY

## 2017-10-18 RX ORDER — GLYCOPYRROLATE 0.2 MG/ML
INJECTION INTRAMUSCULAR; INTRAVENOUS AS NEEDED
Status: DISCONTINUED | OUTPATIENT
Start: 2017-10-18 | End: 2017-10-18 | Stop reason: HOSPADM

## 2017-10-18 RX ORDER — DEXTROMETHORPHAN/PSEUDOEPHED 2.5-7.5/.8
1.2 DROPS ORAL
Status: DISCONTINUED | OUTPATIENT
Start: 2017-10-18 | End: 2017-10-18 | Stop reason: ALTCHOICE

## 2017-10-18 RX ORDER — ESMOLOL HYDROCHLORIDE 10 MG/ML
INJECTION INTRAVENOUS AS NEEDED
Status: DISCONTINUED | OUTPATIENT
Start: 2017-10-18 | End: 2017-10-18 | Stop reason: HOSPADM

## 2017-10-18 RX ORDER — LIDOCAINE HYDROCHLORIDE 10 MG/ML
0.1 INJECTION, SOLUTION EPIDURAL; INFILTRATION; INTRACAUDAL; PERINEURAL AS NEEDED
Status: DISCONTINUED | OUTPATIENT
Start: 2017-10-18 | End: 2017-10-18 | Stop reason: HOSPADM

## 2017-10-18 RX ORDER — CEFAZOLIN SODIUM IN 0.9 % NACL 2 G/100 ML
2-3 PLASTIC BAG, INJECTION (ML) INTRAVENOUS ONCE
Status: COMPLETED | OUTPATIENT
Start: 2017-10-18 | End: 2017-10-18

## 2017-10-18 RX ORDER — ATROPINE SULFATE 0.1 MG/ML
0.5 INJECTION INTRAVENOUS
Status: DISCONTINUED | OUTPATIENT
Start: 2017-10-18 | End: 2017-10-18 | Stop reason: ALTCHOICE

## 2017-10-18 RX ORDER — SODIUM CHLORIDE 0.9 % (FLUSH) 0.9 %
5-10 SYRINGE (ML) INJECTION EVERY 8 HOURS
Status: COMPLETED | OUTPATIENT
Start: 2017-10-18 | End: 2017-10-18

## 2017-10-18 RX ORDER — SODIUM CHLORIDE, SODIUM LACTATE, POTASSIUM CHLORIDE, CALCIUM CHLORIDE 600; 310; 30; 20 MG/100ML; MG/100ML; MG/100ML; MG/100ML
25 INJECTION, SOLUTION INTRAVENOUS CONTINUOUS
Status: DISCONTINUED | OUTPATIENT
Start: 2017-10-18 | End: 2017-10-18 | Stop reason: HOSPADM

## 2017-10-18 RX ORDER — HYDRALAZINE HYDROCHLORIDE 20 MG/ML
INJECTION INTRAMUSCULAR; INTRAVENOUS
Status: COMPLETED
Start: 2017-10-18 | End: 2017-10-18

## 2017-10-18 RX ORDER — FLUMAZENIL 0.1 MG/ML
0.2 INJECTION INTRAVENOUS
Status: DISCONTINUED | OUTPATIENT
Start: 2017-10-18 | End: 2017-10-18 | Stop reason: ALTCHOICE

## 2017-10-18 RX ORDER — POTASSIUM CHLORIDE 7.45 MG/ML
10 INJECTION INTRAVENOUS
Status: COMPLETED | OUTPATIENT
Start: 2017-10-18 | End: 2017-10-18

## 2017-10-18 RX ORDER — FENTANYL CITRATE 50 UG/ML
INJECTION, SOLUTION INTRAMUSCULAR; INTRAVENOUS AS NEEDED
Status: DISCONTINUED | OUTPATIENT
Start: 2017-10-18 | End: 2017-10-18 | Stop reason: HOSPADM

## 2017-10-18 RX ORDER — SODIUM CHLORIDE 0.9 % (FLUSH) 0.9 %
5-10 SYRINGE (ML) INJECTION AS NEEDED
Status: ACTIVE | OUTPATIENT
Start: 2017-10-18 | End: 2017-10-18

## 2017-10-18 RX ORDER — ONDANSETRON 2 MG/ML
INJECTION INTRAMUSCULAR; INTRAVENOUS AS NEEDED
Status: DISCONTINUED | OUTPATIENT
Start: 2017-10-18 | End: 2017-10-18 | Stop reason: HOSPADM

## 2017-10-18 RX ORDER — LIDOCAINE HYDROCHLORIDE AND EPINEPHRINE 20; 5 MG/ML; UG/ML
INJECTION, SOLUTION EPIDURAL; INFILTRATION; INTRACAUDAL; PERINEURAL AS NEEDED
Status: DISCONTINUED | OUTPATIENT
Start: 2017-10-18 | End: 2017-10-18

## 2017-10-18 RX ORDER — DIPHENHYDRAMINE HYDROCHLORIDE 50 MG/ML
12.5 INJECTION, SOLUTION INTRAMUSCULAR; INTRAVENOUS AS NEEDED
Status: DISCONTINUED | OUTPATIENT
Start: 2017-10-18 | End: 2017-10-18 | Stop reason: HOSPADM

## 2017-10-18 RX ORDER — MIDAZOLAM HYDROCHLORIDE 1 MG/ML
0.5 INJECTION, SOLUTION INTRAMUSCULAR; INTRAVENOUS
Status: DISCONTINUED | OUTPATIENT
Start: 2017-10-18 | End: 2017-10-18 | Stop reason: HOSPADM

## 2017-10-18 RX ORDER — FENTANYL CITRATE 50 UG/ML
25 INJECTION, SOLUTION INTRAMUSCULAR; INTRAVENOUS
Status: DISCONTINUED | OUTPATIENT
Start: 2017-10-18 | End: 2017-10-18 | Stop reason: HOSPADM

## 2017-10-18 RX ORDER — ROCURONIUM BROMIDE 10 MG/ML
INJECTION, SOLUTION INTRAVENOUS AS NEEDED
Status: DISCONTINUED | OUTPATIENT
Start: 2017-10-18 | End: 2017-10-18 | Stop reason: HOSPADM

## 2017-10-18 RX ORDER — SUCCINYLCHOLINE CHLORIDE 20 MG/ML
INJECTION INTRAMUSCULAR; INTRAVENOUS AS NEEDED
Status: DISCONTINUED | OUTPATIENT
Start: 2017-10-18 | End: 2017-10-18 | Stop reason: HOSPADM

## 2017-10-18 RX ORDER — HYDROMORPHONE HYDROCHLORIDE 1 MG/ML
.5-1 INJECTION, SOLUTION INTRAMUSCULAR; INTRAVENOUS; SUBCUTANEOUS
Status: DISCONTINUED | OUTPATIENT
Start: 2017-10-18 | End: 2017-10-19

## 2017-10-18 RX ORDER — HYDROMORPHONE HYDROCHLORIDE 1 MG/ML
.2-.5 INJECTION, SOLUTION INTRAMUSCULAR; INTRAVENOUS; SUBCUTANEOUS
Status: DISCONTINUED | OUTPATIENT
Start: 2017-10-18 | End: 2017-10-18 | Stop reason: HOSPADM

## 2017-10-18 RX ORDER — DEXAMETHASONE SODIUM PHOSPHATE 4 MG/ML
INJECTION, SOLUTION INTRA-ARTICULAR; INTRALESIONAL; INTRAMUSCULAR; INTRAVENOUS; SOFT TISSUE AS NEEDED
Status: DISCONTINUED | OUTPATIENT
Start: 2017-10-18 | End: 2017-10-18 | Stop reason: HOSPADM

## 2017-10-18 RX ORDER — NALOXONE HYDROCHLORIDE 0.4 MG/ML
0.4 INJECTION, SOLUTION INTRAMUSCULAR; INTRAVENOUS; SUBCUTANEOUS
Status: DISCONTINUED | OUTPATIENT
Start: 2017-10-18 | End: 2017-10-18 | Stop reason: ALTCHOICE

## 2017-10-18 RX ORDER — FENTANYL CITRATE 50 UG/ML
25 INJECTION, SOLUTION INTRAMUSCULAR; INTRAVENOUS
Status: DISCONTINUED | OUTPATIENT
Start: 2017-10-18 | End: 2017-10-18 | Stop reason: ALTCHOICE

## 2017-10-18 RX ORDER — MIDAZOLAM HYDROCHLORIDE 1 MG/ML
.25-5 INJECTION, SOLUTION INTRAMUSCULAR; INTRAVENOUS
Status: DISCONTINUED | OUTPATIENT
Start: 2017-10-18 | End: 2017-10-18 | Stop reason: ALTCHOICE

## 2017-10-18 RX ORDER — EPINEPHRINE 0.1 MG/ML
1 INJECTION INTRACARDIAC; INTRAVENOUS
Status: DISCONTINUED | OUTPATIENT
Start: 2017-10-18 | End: 2017-10-18 | Stop reason: ALTCHOICE

## 2017-10-18 RX ORDER — FENTANYL CITRATE 50 UG/ML
50 INJECTION, SOLUTION INTRAMUSCULAR; INTRAVENOUS AS NEEDED
Status: DISCONTINUED | OUTPATIENT
Start: 2017-10-18 | End: 2017-10-18 | Stop reason: HOSPADM

## 2017-10-18 RX ORDER — POTASSIUM CHLORIDE 7.45 MG/ML
10 INJECTION INTRAVENOUS
Status: DISCONTINUED | OUTPATIENT
Start: 2017-10-18 | End: 2017-10-18 | Stop reason: SDUPTHER

## 2017-10-18 RX ORDER — SODIUM CHLORIDE 9 MG/ML
75 INJECTION, SOLUTION INTRAVENOUS CONTINUOUS
Status: DISCONTINUED | OUTPATIENT
Start: 2017-10-18 | End: 2017-10-18 | Stop reason: ALTCHOICE

## 2017-10-18 RX ORDER — SODIUM CHLORIDE 0.9 % (FLUSH) 0.9 %
5-10 SYRINGE (ML) INJECTION AS NEEDED
Status: DISCONTINUED | OUTPATIENT
Start: 2017-10-18 | End: 2017-10-18 | Stop reason: HOSPADM

## 2017-10-18 RX ORDER — LIDOCAINE HYDROCHLORIDE 20 MG/ML
INJECTION, SOLUTION EPIDURAL; INFILTRATION; INTRACAUDAL; PERINEURAL AS NEEDED
Status: DISCONTINUED | OUTPATIENT
Start: 2017-10-18 | End: 2017-10-18 | Stop reason: HOSPADM

## 2017-10-18 RX ORDER — MORPHINE SULFATE 10 MG/ML
2 INJECTION, SOLUTION INTRAMUSCULAR; INTRAVENOUS
Status: DISCONTINUED | OUTPATIENT
Start: 2017-10-18 | End: 2017-10-18 | Stop reason: HOSPADM

## 2017-10-18 RX ORDER — HYDRALAZINE HYDROCHLORIDE 20 MG/ML
5 INJECTION INTRAMUSCULAR; INTRAVENOUS ONCE
Status: COMPLETED | OUTPATIENT
Start: 2017-10-18 | End: 2017-10-18

## 2017-10-18 RX ORDER — PROPOFOL 10 MG/ML
INJECTION, EMULSION INTRAVENOUS AS NEEDED
Status: DISCONTINUED | OUTPATIENT
Start: 2017-10-18 | End: 2017-10-18 | Stop reason: HOSPADM

## 2017-10-18 RX ADMIN — SODIUM CHLORIDE 100 ML/HR: 900 INJECTION, SOLUTION INTRAVENOUS at 22:02

## 2017-10-18 RX ADMIN — CEFAZOLIN 2 G: 10 INJECTION, POWDER, FOR SOLUTION INTRAVENOUS; PARENTERAL at 15:00

## 2017-10-18 RX ADMIN — PROPOFOL 100 MG: 10 INJECTION, EMULSION INTRAVENOUS at 14:56

## 2017-10-18 RX ADMIN — ESMOLOL HYDROCHLORIDE 10 MG: 10 INJECTION INTRAVENOUS at 15:48

## 2017-10-18 RX ADMIN — ESMOLOL HYDROCHLORIDE 10 MG: 10 INJECTION INTRAVENOUS at 15:50

## 2017-10-18 RX ADMIN — SODIUM CHLORIDE, POTASSIUM CHLORIDE, SODIUM LACTATE AND CALCIUM CHLORIDE: 600; 310; 30; 20 INJECTION, SOLUTION INTRAVENOUS at 14:47

## 2017-10-18 RX ADMIN — Medication 10 ML: at 05:27

## 2017-10-18 RX ADMIN — POTASSIUM CHLORIDE 10 MEQ: 10 INJECTION, SOLUTION INTRAVENOUS at 13:00

## 2017-10-18 RX ADMIN — HYDRALAZINE HYDROCHLORIDE 5 MG: 20 INJECTION INTRAMUSCULAR; INTRAVENOUS at 16:17

## 2017-10-18 RX ADMIN — Medication 10 ML: at 22:07

## 2017-10-18 RX ADMIN — DEXTROSE MONOHYDRATE 12.5 G: 25 INJECTION, SOLUTION INTRAVENOUS at 13:07

## 2017-10-18 RX ADMIN — SODIUM CHLORIDE 100 ML/HR: 900 INJECTION, SOLUTION INTRAVENOUS at 05:26

## 2017-10-18 RX ADMIN — PROPOFOL 50 MG: 10 INJECTION, EMULSION INTRAVENOUS at 14:58

## 2017-10-18 RX ADMIN — DEXAMETHASONE SODIUM PHOSPHATE 10 MG: 4 INJECTION, SOLUTION INTRA-ARTICULAR; INTRALESIONAL; INTRAMUSCULAR; INTRAVENOUS; SOFT TISSUE at 15:16

## 2017-10-18 RX ADMIN — HYDROMORPHONE HYDROCHLORIDE 1 MG: 1 INJECTION, SOLUTION INTRAMUSCULAR; INTRAVENOUS; SUBCUTANEOUS at 18:29

## 2017-10-18 RX ADMIN — ROCURONIUM BROMIDE 10 MG: 10 INJECTION, SOLUTION INTRAVENOUS at 15:07

## 2017-10-18 RX ADMIN — LIDOCAINE HYDROCHLORIDE 60 MG: 20 INJECTION, SOLUTION EPIDURAL; INFILTRATION; INTRACAUDAL; PERINEURAL at 14:56

## 2017-10-18 RX ADMIN — GLYCOPYRROLATE 0.1 MG: 0.2 INJECTION INTRAMUSCULAR; INTRAVENOUS at 15:40

## 2017-10-18 RX ADMIN — PROPOFOL 20 MG: 10 INJECTION, EMULSION INTRAVENOUS at 15:07

## 2017-10-18 RX ADMIN — HYDROMORPHONE HYDROCHLORIDE 0.5 MG: 1 INJECTION, SOLUTION INTRAMUSCULAR; INTRAVENOUS; SUBCUTANEOUS at 05:35

## 2017-10-18 RX ADMIN — HYDROCODONE BITARTRATE AND ACETAMINOPHEN 1 TABLET: 5; 325 TABLET ORAL at 10:21

## 2017-10-18 RX ADMIN — HYDROCODONE BITARTRATE AND ACETAMINOPHEN 1 TABLET: 5; 325 TABLET ORAL at 23:29

## 2017-10-18 RX ADMIN — ONDANSETRON 4 MG: 2 INJECTION INTRAMUSCULAR; INTRAVENOUS at 15:16

## 2017-10-18 RX ADMIN — GLYCOPYRROLATE 0.1 MG: 0.2 INJECTION INTRAMUSCULAR; INTRAVENOUS at 15:06

## 2017-10-18 RX ADMIN — SUCCINYLCHOLINE CHLORIDE 100 MG: 20 INJECTION INTRAMUSCULAR; INTRAVENOUS at 14:57

## 2017-10-18 RX ADMIN — ESMOLOL HYDROCHLORIDE 10 MG: 10 INJECTION INTRAVENOUS at 16:04

## 2017-10-18 RX ADMIN — FENTANYL CITRATE 50 MCG: 50 INJECTION, SOLUTION INTRAMUSCULAR; INTRAVENOUS at 14:56

## 2017-10-18 RX ADMIN — HYDRALAZINE HYDROCHLORIDE 10 MG: 20 INJECTION INTRAMUSCULAR; INTRAVENOUS at 10:21

## 2017-10-18 RX ADMIN — POTASSIUM CHLORIDE 10 MEQ: 10 INJECTION, SOLUTION INTRAVENOUS at 12:00

## 2017-10-18 RX ADMIN — DOCUSATE SODIUM 100 MG: 100 CAPSULE, LIQUID FILLED ORAL at 17:59

## 2017-10-18 NOTE — ANESTHESIA PREPROCEDURE EVALUATION
Anesthetic History   No history of anesthetic complications            Review of Systems / Medical History  Patient summary reviewed, nursing notes reviewed and pertinent labs reviewed    Pulmonary  Within defined limits                 Neuro/Psych   Within defined limits           Cardiovascular    Hypertension              Exercise tolerance: >4 METS  Comments: Hx bradycardia     GI/Hepatic/Renal               Comments: Retained Gallstone  Hx pancreatitis Endo/Other        Arthritis    Comments: Hx Hyperparathyroidism   Other Findings   Comments: Hearing loss   Gout   Allergic rhinitis  Musculoskeletal disorder            Physical Exam    Airway  Mallampati: II  TM Distance: 4 - 6 cm  Neck ROM: decreased range of motion   Mouth opening: Normal     Cardiovascular  Regular rate and rhythm,  S1 and S2 normal,  no murmur, click, rub, or gallop             Dental      Comments: Multiple missing teeth.  Chipped R upper incisor   Pulmonary  Breath sounds clear to auscultation               Abdominal  GI exam deferred       Other Findings            Anesthetic Plan    ASA: 2  Anesthesia type: general          Induction: Intravenous  Anesthetic plan and risks discussed with: Patient

## 2017-10-18 NOTE — PROCEDURES
NAME:  Rose London   :   1935   MRN:   [id number]     [facility]  Date/Time:  10/18/2017 4:34 PM    Procedure Type:   ERCP with biliary sphincterotomy, biliary stent placement, biliary tissue sampling     Indications: common bile duct stone, abnormal cholangiogram, rising bilirubin  Pre-operative Diagnosis: see indication above  Post-operative Diagnosis:  See findings below  : Hetal Torres MD  Referring Provider:    Jeri Padgett MD; Kermit Lee MD    Exam:  Airway: clear, no airway problems anticipated  Heart: RRR, without gallops or rubs  Lungs: clear bilaterally without wheezes, crackles, or rhonchi  Abdomen: soft, nontender, nondistended, bowel sounds present  Mental Status: awake, alert and oriented to person, place and time    Sedation:  General anesthesia  Procedure Details:  After informed consent was obtained with all risks and benefits of procedure explained, the patient was taken to the fluoroscopy suite and placed in the prone position. Upon sequential sedation as per above, the Olympus duodenoscope HTJF-180V  was inserted via the mouthpeice and carefully advanced to the second portion of the duodenum. The quality of visualization was adequate. The duodenoscope was withdrawn into a short position. 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 EBL:  None. Interventions:    Pancreatic: none  Biliary: Prompt wire-guided CBD cannulation using Dreamtome device with position confirmed fluoroscopically.   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 Sphincterotomy completed with minial bleeding. 12mm balloon is adanced, after exchange to the bifurcation of the CBD and noted to readily pull through what feels likel a soft distal CBD stricture. and does not eject stone or sludge despite 4 pull-throughs. Brushings are obtained of the distal CBD stricture. A 10mm x 40mm fully-covered metal WallFlex biliary stent is deployed with waisting in its mid portion consitent with demonstrated stricture. Good bile and contrast efflux is noted. The patient is noted to tolerate the procedure well. 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    -Sphincterotomy completed with minial bleeding. 12mm balloon is adanced, after exchange to the bifurcation of the CBD and noted to readily pull through what feels likel a soft distal CBD stricture, and does not eject stone or sludge despite 4 pull-throughs. -Brushings are obtained of the distal CBD stricture. -A 10mm x 40mm fully-covered metal WallFlex biliary stent is deployed with waisting in its mid portion consitent with demonstrated stricture. -Good bile and contrast efflux is noted after stetn placement    Recommendations:    -Keep NPO, then sips of clears tonight.  -  NO aspirin for 10 days  - You will receive a letter about the biopsy results in about 3 days.  You may be asked to call your doctor's office for the results.  -Check CMP, Lipase, CBC tomorrow  -IVF  -Will need to remove stent in 3-6 months  -Findings d/w Pt's wife and     Discharge Disposition:  To room following recovery in PACU    Veronica Lock MD

## 2017-10-18 NOTE — PERIOP NOTES
TRANSFER - IN REPORT:    Verbal report received from [facility] on Deisy Nuñez  being received from 2184 for ordered procedure      Report consisted of patients Situation, Background, Assessment and   Recommendations(SBAR). Information from the following report(s) SBAR, ED Summary, Procedure Summary, Intake/Output and MAR was reviewed with the receiving nurse. Opportunity for questions and clarification was provided. Assessment completed upon patients arrival to unit and care assumed.

## 2017-10-18 NOTE — ANESTHESIA POSTPROCEDURE EVALUATION
Post-Anesthesia Evaluation and Assessment    Patient: Gary Huston MRN: [id number]  SSN: [id number]    YOB: 1935  Age: 80 y.o. Sex: male       Cardiovascular Function/Vital Signs  Visit Vitals    /84    Pulse (!) 102    Temp 36.2 °C (97.2 °F)    Resp 26    Ht 5' 3\" (1.6 m)    Wt 74.4 kg (164 lb)    SpO2 94%    BMI 29.05 kg/m2       Patient is status post general anesthesia for Procedure(s):  ENDOSCOPIC RETROGRADE CHOLANGIOPANCREATOGRAPHY WITH BIE DUCT BRUSHINGS AND STENT PLACEMENT. Nausea/Vomiting: None    Postoperative hydration reviewed and adequate. Pain:  Pain Scale 1: Numeric (0 - 10) (10/18/17 1630)  Pain Intensity 1: 0 (10/18/17 1630)   Managed    Neurological Status:   Neuro (WDL): Exceptions to WDL (10/18/17 1604)  Neuro  Neurologic State: Drowsy (10/18/17 1604)  Orientation Level: Oriented to person;Oriented to place;Oriented to situation (10/18/17 1604)  Cognition: Appropriate decision making; Appropriate for age attention/concentration; Appropriate safety awareness; Follows commands (10/18/17 1604)  Speech: Clear (10/18/17 1604)  LUE Motor Response: Purposeful (10/18/17 1604)  LLE Motor Response: Purposeful (10/18/17 1604)  RUE Motor Response: Purposeful (10/18/17 1604)  RLE Motor Response: Purposeful (10/18/17 1604)   At baseline    Mental Status and Level of Consciousness: Arousable    Pulmonary Status:   O2 Device: Nasal cannula (10/18/17 1640)   Adequate oxygenation and airway patent    Complications related to anesthesia: None    Post-anesthesia assessment completed.  No concerns    Signed By: Bladimir Nova MD     October 18, 2017

## 2017-10-18 NOTE — PROGRESS NOTES
Nutrition Services      Nutrition Screen:  Wt Readings from Last 10 Encounters:   10/16/17 74.4 kg (164 lb)   10/13/17 74.8 kg (164 lb 14.5 oz)   12/12/16 75.8 kg (167 lb 3.2 oz)   08/17/14 68.4 kg (150 lb 12.7 oz)   07/17/14 68.5 kg (151 lb)     Body mass index is 29.05 kg/(m^2). Supplements:                        _____ ordered ______  declined. __ __  Pt is nutritionally stable at this time, will rescreen in 7 days. __ __    Pt is at nutritional risk and will be rescreened in  days. _x __  Pt is at moderate or high nutritional risk, will refer to RD for assessment.        Ester Worley  Dietetic Technician, Registered

## 2017-10-18 NOTE — H&P
See prior Consultation Note. The patient was reexamined. No interval change in the last 30 days. Proceed with procedure(s) with GA as clinically indicated.

## 2017-10-18 NOTE — PROGRESS NOTES
Bedside shift change report given to Jocy Ceron (oncoming nurse) by Carmen Hendrickson (offgoing nurse). Report included the following information SBAR, Kardex, OR Summary, Procedure Summary, Intake/Output, MAR, Accordion and Recent Results     ERCP with stent placed today.

## 2017-10-18 NOTE — PROGRESS NOTES
Admit Date: 10/13/2017  POD 2 Days Post-Op  Status/Post:  Procedure(s):  CHOLECYSTECTOMY LAPAROSCOPIC WITH GRAMS WITH COMMON BILE DUCT EXPLORATION    Assessment:     Principal Problem:    Acute pancreatitis (10/13/2017)    Active Problems:    Gallstones (2014)        Plan/Recommendations/Medical Decision Making:       Pt a little distended, otherwise stable  jennifer some PO yesterday  Lipase down, Jorge up    May benefit from ERCP. Timing to be discussed with Dr. Lm Cowart      -------------------------------------------------------------------------------------------------------------------      Subjective:     Patient has no new complaints. no nausea  Positive Flatus  Positive Bowel Movement      Objective:     Blood pressure 187/82, pulse (!) 57, temperature 98.3 °F (36.8 °C), resp. rate 18, height 5' 3\" (1.6 m), weight 74.4 kg (164 lb), SpO2 91 %. Temp (24hrs), Av.2 °F (36.8 °C), Min:97.6 °F (36.4 °C), Max:98.6 °F (37 °C)      Physical Exam:   Abdominal exam: soft  non-distended  appropriatly tender.   Wound: clean, dry, no drainage    Labs:   Recent Results (from the past 24 hour(s))   GLUCOSE, POC    Collection Time: 10/17/17  7:58 AM   Result Value Ref Range    Glucose (POC) 123 (H) 65 - 100 mg/dL    Performed by Darci Schwartz (PCT)    GLUCOSE, POC    Collection Time: 10/17/17 11:12 AM   Result Value Ref Range    Glucose (POC) 121 (H) 65 - 100 mg/dL    Performed by Darci Schwartz (PCT)    GLUCOSE, POC    Collection Time: 10/17/17  3:08 PM   Result Value Ref Range    Glucose (POC) 116 (H) 65 - 100 mg/dL    Performed by Darci Schwartz (PCT)    GLUCOSE, POC    Collection Time: 10/17/17  8:58 PM   Result Value Ref Range    Glucose (POC) 118 (H) 65 - 100 mg/dL    Performed by Ab Coppola    CBC WITH AUTOMATED DIFF    Collection Time: 10/18/17  3:51 AM   Result Value Ref Range    WBC 6.3 4.1 - 11.1 K/uL    RBC 4.38 4.10 - 5.70 M/uL    HGB 13.0 12.1 - 17.0 g/dL    HCT 38.6 36.6 - 50.3 %    MCV 88.1 80.0 - 99.0 FL    MCH 29.7 26.0 - 34.0 PG    MCHC 33.7 30.0 - 36.5 g/dL    RDW 13.0 11.5 - 14.5 %    PLATELET 031 (L) 883 - 400 K/uL    NEUTROPHILS 74 32 - 75 %    LYMPHOCYTES 16 12 - 49 %    MONOCYTES 8 5 - 13 %    EOSINOPHILS 2 0 - 7 %    BASOPHILS 0 0 - 1 %    ABS. NEUTROPHILS 4.7 1.8 - 8.0 K/UL    ABS. LYMPHOCYTES 1.0 0.8 - 3.5 K/UL    ABS. MONOCYTES 0.5 0.0 - 1.0 K/UL    ABS. EOSINOPHILS 0.1 0.0 - 0.4 K/UL    ABS. BASOPHILS 0.0 0.0 - 0.1 K/UL   METABOLIC PANEL, COMPREHENSIVE    Collection Time: 10/18/17  3:51 AM   Result Value Ref Range    Sodium 139 136 - 145 mmol/L    Potassium 3.4 (L) 3.5 - 5.1 mmol/L    Chloride 106 97 - 108 mmol/L    CO2 28 21 - 32 mmol/L    Anion gap 5 5 - 15 mmol/L    Glucose 93 65 - 100 mg/dL    BUN 10 6 - 20 MG/DL    Creatinine 1.05 0.70 - 1.30 MG/DL    BUN/Creatinine ratio 10 (L) 12 - 20      GFR est AA >60 >60 ml/min/1.73m2    GFR est non-AA >60 >60 ml/min/1.73m2    Calcium 8.0 (L) 8.5 - 10.1 MG/DL    Bilirubin, total 3.7 (H) 0.2 - 1.0 MG/DL    ALT (SGPT) 125 (H) 12 - 78 U/L    AST (SGOT) 101 (H) 15 - 37 U/L    Alk.  phosphatase 291 (H) 45 - 117 U/L    Protein, total 5.7 (L) 6.4 - 8.2 g/dL    Albumin 2.6 (L) 3.5 - 5.0 g/dL    Globulin 3.1 2.0 - 4.0 g/dL    A-G Ratio 0.8 (L) 1.1 - 2.2     LIPASE    Collection Time: 10/18/17  3:51 AM   Result Value Ref Range    Lipase 358 73 - 393 U/L   GLUCOSE, POC    Collection Time: 10/18/17  7:29 AM   Result Value Ref Range    Glucose (POC) 93 65 - 100 mg/dL    Performed by Edgard Cardoza (PCT)        Data Review

## 2017-10-18 NOTE — PROGRESS NOTES
12:00 AM Report received from [facility].     Patient has been NPO since midnight for possible ERCP this AM.

## 2017-10-18 NOTE — PERIOP NOTES
TRANSFER - OUT REPORT:    Verbal report given to [facility], RN(name) on Flora Le  being transferred to [facility](unit) for routine progression of care       Report consisted of patients Situation, Background, Assessment and   Recommendations(SBAR). Information from the following report(s) SBAR, OR Summary, Procedure Summary, Intake/Output and MAR was reviewed with the receiving nurse. Opportunity for questions and clarification was provided.       Patient transported with:   O2 @ 3 liters  Tech

## 2017-10-18 NOTE — PROGRESS NOTES
Interdisciplinary Rounds were completed on this patient. Rounds included nursing, clinical care leader, pharmacy, and case management. Patient was doing well without problems. Patient had the following concerns: none. Goals for the day will include: mobilize and ERCP today.

## 2017-10-19 VITALS
BODY MASS INDEX: 29.06 KG/M2 | TEMPERATURE: 97.6 F | HEIGHT: 63 IN | WEIGHT: 164 LBS | DIASTOLIC BLOOD PRESSURE: 76 MMHG | OXYGEN SATURATION: 93 % | SYSTOLIC BLOOD PRESSURE: 166 MMHG | RESPIRATION RATE: 16 BRPM | HEART RATE: 73 BPM

## 2017-10-19 LAB
ALBUMIN SERPL-MCNC: 2.5 G/DL (ref 3.5–5)
ALBUMIN/GLOB SERPL: 0.7 {RATIO} (ref 1.1–2.2)
ALP SERPL-CCNC: 293 U/L (ref 45–117)
ALT SERPL-CCNC: 96 U/L (ref 12–78)
ANION GAP SERPL CALC-SCNC: 9 MMOL/L (ref 5–15)
AST SERPL-CCNC: 63 U/L (ref 15–37)
BILIRUB SERPL-MCNC: 1.8 MG/DL (ref 0.2–1)
BUN SERPL-MCNC: 13 MG/DL (ref 6–20)
BUN/CREAT SERPL: 14 (ref 12–20)
CALCIUM SERPL-MCNC: 8.1 MG/DL (ref 8.5–10.1)
CHLORIDE SERPL-SCNC: 106 MMOL/L (ref 97–108)
CO2 SERPL-SCNC: 23 MMOL/L (ref 21–32)
CREAT SERPL-MCNC: 0.93 MG/DL (ref 0.7–1.3)
ERYTHROCYTE [DISTWIDTH] IN BLOOD BY AUTOMATED COUNT: 13.2 % (ref 11.5–14.5)
GLOBULIN SER CALC-MCNC: 3.4 G/DL (ref 2–4)
GLUCOSE BLD STRIP.AUTO-MCNC: 100 MG/DL (ref 65–100)
GLUCOSE SERPL-MCNC: 101 MG/DL (ref 65–100)
HCT VFR BLD AUTO: 37 % (ref 36.6–50.3)
HGB BLD-MCNC: 12.5 G/DL (ref 12.1–17)
LIPASE SERPL-CCNC: 777 U/L (ref 73–393)
MCH RBC QN AUTO: 29.8 PG (ref 26–34)
MCHC RBC AUTO-ENTMCNC: 33.8 G/DL (ref 30–36.5)
MCV RBC AUTO: 88.3 FL (ref 80–99)
PLATELET # BLD AUTO: 196 K/UL (ref 150–400)
POTASSIUM SERPL-SCNC: 4 MMOL/L (ref 3.5–5.1)
PROT SERPL-MCNC: 5.9 G/DL (ref 6.4–8.2)
RBC # BLD AUTO: 4.19 M/UL (ref 4.1–5.7)
SERVICE CMNT-IMP: NORMAL
SODIUM SERPL-SCNC: 138 MMOL/L (ref 136–145)
WBC # BLD AUTO: 7.6 K/UL (ref 4.1–11.1)

## 2017-10-19 PROCEDURE — 36415 COLL VENOUS BLD VENIPUNCTURE: CPT | Performed by: INTERNAL MEDICINE

## 2017-10-19 PROCEDURE — 85027 COMPLETE CBC AUTOMATED: CPT | Performed by: INTERNAL MEDICINE

## 2017-10-19 PROCEDURE — 74011250637 HC RX REV CODE- 250/637: Performed by: SURGERY

## 2017-10-19 PROCEDURE — 83690 ASSAY OF LIPASE: CPT | Performed by: INTERNAL MEDICINE

## 2017-10-19 PROCEDURE — 80053 COMPREHEN METABOLIC PANEL: CPT | Performed by: INTERNAL MEDICINE

## 2017-10-19 PROCEDURE — 74011250637 HC RX REV CODE- 250/637: Performed by: HOSPITALIST

## 2017-10-19 PROCEDURE — 74011250636 HC RX REV CODE- 250/636: Performed by: HOSPITALIST

## 2017-10-19 PROCEDURE — 77010033678 HC OXYGEN DAILY

## 2017-10-19 PROCEDURE — 82962 GLUCOSE BLOOD TEST: CPT

## 2017-10-19 RX ORDER — DOCUSATE SODIUM 100 MG/1
100 CAPSULE, LIQUID FILLED ORAL
Qty: 60 CAP | Refills: 2 | Status: SHIPPED | OUTPATIENT
Start: 2017-10-19 | End: 2017-10-19

## 2017-10-19 RX ORDER — HYDROCODONE BITARTRATE AND ACETAMINOPHEN 5; 325 MG/1; MG/1
1 TABLET ORAL
Qty: 10 TAB | Refills: 0 | Status: SHIPPED | OUTPATIENT
Start: 2017-10-19 | End: 2017-11-30

## 2017-10-19 RX ORDER — ONDANSETRON 4 MG/1
4 TABLET, ORALLY DISINTEGRATING ORAL
Qty: 10 TAB | Refills: 0 | Status: SHIPPED | OUTPATIENT
Start: 2017-10-19 | End: 2017-11-30

## 2017-10-19 RX ORDER — DOCUSATE SODIUM 100 MG/1
100 CAPSULE, LIQUID FILLED ORAL
Qty: 30 CAP | Refills: 0 | Status: SHIPPED | OUTPATIENT
Start: 2017-10-19 | End: 2020-11-25

## 2017-10-19 RX ADMIN — DOCUSATE SODIUM 100 MG: 100 CAPSULE, LIQUID FILLED ORAL at 08:41

## 2017-10-19 RX ADMIN — VITAMIN D, TAB 1000IU (100/BT) 1000 UNITS: 25 TAB at 08:42

## 2017-10-19 RX ADMIN — HYDRALAZINE HYDROCHLORIDE 10 MG: 20 INJECTION INTRAMUSCULAR; INTRAVENOUS at 08:42

## 2017-10-19 RX ADMIN — Medication 10 ML: at 06:40

## 2017-10-19 NOTE — ROUTINE PROCESS
follow up appointments scheduled and placed on AVS.   Ra De Leon, [address] [place] [address]  [phone]

## 2017-10-19 NOTE — PROGRESS NOTES
Pt will discharge back to [facility] today. Pt resides with spouse. Pt will be transported via [facility]. CM provided second IM letter and placed copy on bedside chart. There were no additional discharge needs at this time. Care Management Interventions  PCP Verified by CM: Yes (Dr. Brenna Feldman )  Mode of Transport at Discharge:  Other (see comment) [facility] )  Transition of Care Consult (CM Consult): Discharge Planning (CM to assist as needed )  Discharge Durable Medical Equipment: No  Health Maintenance Reviewed: Yes  Physical Therapy Consult: No  Occupational Therapy Consult: No  Speech Therapy Consult: No  Current Support Network: Lives with Spouse, Other (Pt resides with spouse in a retirenment community )  Confirm Follow Up Transport: Other (see comment) ([facility] )  Plan discussed with Pt/Family/Caregiver: Yes  Discharge Location  Discharge Placement: Home with family assistance    MAX Blanco, [address] [facility]   [phone]

## 2017-10-19 NOTE — DISCHARGE SUMMARY
Hospitalist Discharge Summary     Patient ID:  Aries Choudhary  [id number]  6 y.o.  1935    PCP on record: Meghan Lopez MD    Admit date: 10/13/2017  Discharge date and time: 10/19/2017      DISCHARGE DIAGNOSIS:    Gallstone pancreatitis, POA lipase >3000  Elevated LFTs and bilirubin, POA, secondary to above  S/p laparoscopic cholecystectomy with IOC and CBD exploration 10/16 with Dr. Maxx Chance stricture s/p ERCP with brushings and stent placement with 10/18 with Dr. Estrellita Argueta    HTN  vitamin D deficiency  Hypophosphatemia  Hypokalemia      CONSULTATIONS:  IP CONSULT TO GASTROENTEROLOGY  IP CONSULT TO GENERAL SURGERY    Excerpted HPI from H&P of Heather Gibbs MD:  Aries Choudhary is a 80 y.o.  male from [address] home independent living who presents with bilateral lower abdominal pain, 6 to 8/10, crampy and like \"dagger\", started a few hours after dinner of spaghetti and meatballs, associated with severe chills (shaking in arms and legs) and diarrhea. Abdominal pain relieved by diarrhea. Denies any blood in stool. +nausea, no vomiting. No further diarrhea since arrived to ER. Pain relieved with morphine 4mg x 1 dose in ER and currently minimal pain. No fever, no weight loss. Appetite good. +abdominal bloating today.     Has hx of gallstone and seen Dr. Gene Garcia in 8/2014 with plan for lap samir and liver biopsy but reported that he was given some medication which treated abdominal pain and he did not have surgery after all.     In ER, labs noted for lipase >3000, , , alk phos 184, t. Bili 2.1. Limited abd US RUQ showed liver is normal in appearance without evidence of mass lesion or biliary  dilatation. Gallstones are noted. There is no gallbladder wall thickening or  sonographic Mckeon's sign. Common bile duct is normal in size.  The right kidney  is normal in size and appearance without evidence of mass lesion,  hydronephrosis, or calcification.  The pancreas is not visualized due to bowel  gas. No free fluid.       Denies any prior hx of pancreatitis. No alcohol use.    ______________________________________________________________________  DISCHARGE SUMMARY/HOSPITAL COURSE:  for full details see H&P, daily progress notes, labs, consult notes. Gallstone pancreatitis, POA lipase >3000  Elevated LFTs and bilirubin, POA, secondary to above  S/p laparoscopic cholecystectomy with IOC and CBD exploration 10/16 with Dr. Alfonso Helms stricture s/p ERCP with brushings and stent placement with 10/18 with Dr. Lm Cowart  -MRI \"Cholelithiasis. Common duct is normal caliber. No stones are identified  within the duct. There is a small amount of nonspecific fluid adjacent to the  duodenum/gallbladder neck\"  -CBD stone on IOC  -Iipase slightly up following ERCP, bili coming down. -GI and surgery following, okay to DC this afternoon if tolerating PO.  -To follow with Surgery, Dr. Blanquita Steele  -To follow in 1 month with GI, Dr. Lm Cowart. Follow up brushings an discuss stent removal in 3-6 months. HTN-resume home amlodipine and lisinopril on DC  vitamin D deficiency-Continue Vitamin D  Hypophosphatemia- resolved  Hypokalemia-resolved     Body mass index is 29.23 kg/(m^2). _______________________________________________________________________  Patient seen and examined by me on discharge day. Pertinent Findings:  Gen:    Not in distress  Chest: Clear lungs  CVS:   Regular rhythm. No edema  Abd:  Soft, not distended, not tender,   Neuro:  Alert, oriented x 3, no focal deficits  _______________________________________________________________________  DISCHARGE MEDICATIONS:   Current Discharge Medication List      START taking these medications    Details   ondansetron (ZOFRAN ODT) 4 mg disintegrating tablet Take 1 Tab by mouth every six (6) hours as needed.   Qty: 10 Tab, Refills: 0      HYDROcodone-acetaminophen (NORCO) 5-325 mg per tablet Take 1 Tab by mouth every eight (8) hours as needed. Max Daily Amount: 3 Tabs. Qty: 10 Tab, Refills: 0      docusate sodium (COLACE) 100 mg capsule Take 1 Cap by mouth two (2) times daily as needed for Constipation. Qty: 30 Cap, Refills: 0         CONTINUE these medications which have NOT CHANGED    Details   montelukast (SINGULAIR) 10 mg tablet Take 10 mg by mouth daily. albuterol (PROVENTIL VENTOLIN) 2.5 mg /3 mL (0.083 %) nebulizer solution 2.5 mg by Nebulization route every six (6) hours as needed for Wheezing. Inhale 3 mL by nebulizer every six (6) hours AS NEEDED for COPD while awake      amLODIPine (NORVASC) 5 mg tablet Take 5 mg by mouth daily. lisinopril (PRINIVIL, ZESTRIL) 20 mg tablet Take 20 mg by mouth daily. loratadine (CLARITIN) 10 mg tablet Take 10 mg by mouth daily. cholecalciferol (VITAMIN D3) 1,000 unit cap Take 1,000 Units by mouth daily. ibuprofen (MOTRIN) 600 mg tablet Take 600 mg by mouth two (2) times daily as needed for Pain (Knee pain). guaiFENesin ER (MUCINEX) 600 mg ER tablet Take 600 mg by mouth daily as needed for Congestion. albuterol (VENTOLIN HFA) 90 mcg/actuation inhaler Take 2 Puffs by inhalation every four (4) hours as needed for Wheezing or Shortness of Breath (Bronchitis). diclofenac (VOLTAREN) 1 % gel Apply  to affected area two (2) times daily as needed. Apply a thin layer to the foot/knee two (2) times daily as needed for pain      fluticasone (FLONASE) 50 mcg/actuation nasal spray 1 [place] by Both Nostrils route daily as needed for Rhinitis or Allergies. My Recommended Diet, Activity, Wound Care, and follow-up labs are listed in the patient's Discharge Insturctions which I have personally completed and reviewed.     _______________________________________________________________________  DISPOSITION:    Home with Family:    Home with HH/PT/OT/RN:    SNF/LTC:    JOEL:    OTHER: longterm       Condition at Discharge: Stable  _______________________________________________________________________  Follow up with:   PCP : Gisela Burnett MD  Follow-up Information     Follow up With Details Delos Dubin, MD Go on 11/20/2017 Surgery follow up at [facility] [address] [place] [address]      Gisela Burnett MD  Patient needs referral from PCP to see Dr. Navid Orozco [address] [place] [address]  [phone]      Rachael Mehta MD In 2 weeks  [address] [place] [address]  [phone]                Total time in minutes spent coordinating this discharge (includes going over instructions, follow-up, prescriptions, and preparing report for sign off to her PCP) :  40 minutes    Signed:  Amalia Buitrago MD

## 2017-10-19 NOTE — ROUTINE PROCESS
Report Ethan Ang LPN at the [address]. Nurse had no further questions at this time. Pt given all discharge instructions. Time made for clarity and concerns. Pt denied having further questions for MD or RN. Peripheral IV access removed. Pt taken by volunteer in wheelchair to meet messonic home car.  All belongings with patient

## 2017-10-19 NOTE — PROGRESS NOTES
GI Progress Note Glory Borders)  NAME:Wilber Baltazar :1935 XSS:[id number]   ATTG: Rachelle Self MD  PCP: Roberth Guardado MD  Date/Time:  10/19/2017 9823  Assessment:   · GS pancreatitis-  lipase minimally elevated following ERCP  · Biliary stricture- cytology brushings pending   · Elevated LFTs- improving  · Abdominal pain- resolved      Plan:   · Allow diet  · Will need f/u with me in next month with discussion re: stent removal in 3-6mo   Consider for d/c home today  Subjective:   Discussed with RN events overnight. No pain or F. Hungry. Complaint Y/N Description   Abdominal Pain n    Hematemesis n    Hematochezia n    Melena n    Constipation n    Diarrhea n    Dyspepsia n    Dysphagia n    Jaundiced n    Nausea/vomiting n      Review of Systems:  Symptom Y/N Comments  Symptom Y/N Comments   Fever/Chills n   Chest Pain n    Cough n   Headaches n    Sputum n   Joint Pain n    SOB/ADRIAN n   Pruritis/Rash n    Tolerating Diet y sips  Other       Could NOT obtain due to:      Objective:   VITALS:   Last 24hrs VS reviewed since prior progress note. Most recent are:  Visit Vitals    /80 (BP 1 Location: Right arm)    Pulse 63    Temp 98 °F (36.7 °C)    Resp 26    Ht 5' 3\" (1.6 m)    Wt 74.4 kg (164 lb)    SpO2 93%    BMI 29.05 kg/m2       Intake/Output Summary (Last 24 hours) at 10/19/17 0946  Last data filed at 10/19/17 7856   Gross per 24 hour   Intake              940 ml   Output                0 ml   Net              940 ml     PHYSICAL EXAM:  General: WD, WN. Alert, cooperative, no acute distress    HEENT: NC, Atraumatic. PERRL. Anicteric sclerae. Lungs:  CTA Bilaterally. No Wheezing/Rhonchi/Rales. Heart:  Regular  rhythm,  No murmur/Rub/Gallops  Abdomen: Soft, NT, Non distended, +BS  Extremities: No c/c/e  Neurologic:  CN 2-12 gi, A/O X 3. No acute neurological distress   Psych:   Good insight. Not anxious nor agitated.     Lab and Radiology Data Reviewed: (see below)  [facility] 10/16/17: demonstrates a filling defect in the distal CBD. A balloon catheter was passed. The last image demonstrates some runoff of contrast into the small bowel. Medications Reviewed: (see below)  PMH/SH reviewed - no change compared to H&P  ________________________________________________________________________  Total time spent with patient: 15 minutes ________________________________________________________________________  Care Plan discussed with:  Patient y   Family     RN               Consultant:  lon Mendoza MD     Procedures: see electronic medical records for all procedures/Xrays and details which were not copied into this note but were reviewed prior to creation of Plan. LABS:  Recent Labs      10/19/17   0330  10/18/17   0351   WBC  7.6  6.3   HGB  12.5  13.0   HCT  37.0  38.6   PLT  196  139*     Recent Labs      10/19/17   0330  10/18/17   0351  10/17/17   0600   NA  138  139  142   K  4.0  3.4*  3.7   CL  106  106  107   CO2  23  28  26   BUN  13  10  11   CREA  0.93  1.05  1.05   GLU  101*  93  106*   CA  8.1*  8.0*  8.4*   MG   --    --   1.9   PHOS   --    --   3.6     Recent Labs      10/19/17   0330  10/18/17   0351  10/17/17   0600   SGOT  63*  101*  128*   AP  293*  291*  251*   TP  5.9*  5.7*  5.7*   ALB  2.5*  2.6*  2.6*   GLOB  3.4  3.1  3.1   LPSE  777*  358  2976*     No results for input(s): INR, PTP, APTT in the last 72 hours. No lab exists for component: INREXT, INREXT   No results for input(s): FE, TIBC, PSAT, FERR in the last 72 hours. No results found for: FOL, RBCF  No results for input(s): PH, PCO2, PO2 in the last 72 hours. No results for input(s): CPK, CKMB in the last 72 hours.     No lab exists for component: TROPONINI  Lab Results   Component Value Date/Time    Color DARK YELLOW 10/13/2017 06:12 AM    Appearance CLEAR 10/13/2017 06:12 AM    Specific gravity 1.015 10/13/2017 06:12 AM    pH (UA) 5.5 10/13/2017 06:12 AM    Protein NEGATIVE  10/13/2017 06:12 AM Glucose NEGATIVE  10/13/2017 06:12 AM    Ketone NEGATIVE  10/13/2017 06:12 AM    Bilirubin NEGATIVE  10/13/2017 06:12 AM    Urobilinogen 2.0 10/13/2017 06:12 AM    Nitrites NEGATIVE  10/13/2017 06:12 AM    Leukocyte Esterase NEGATIVE  10/13/2017 06:12 AM    Epithelial cells FEW 10/13/2017 06:12 AM    Bacteria NEGATIVE  10/13/2017 06:12 AM    WBC 0-4 10/13/2017 06:12 AM    RBC 0-5 10/13/2017 06:12 AM       MEDICATIONS:  Current Facility-Administered Medications   Medication Dose Route Frequency    HYDROmorphone (PF) (DILAUDID) injection 0.5-1 mg  0.5-1 mg IntraVENous Q2H PRN    insulin lispro (HUMALOG) injection   SubCUTAneous AC&HS    0.9% sodium chloride infusion  100 mL/hr IntraVENous CONTINUOUS    acetaminophen (TYLENOL) tablet 650 mg  650 mg Oral Q4H PRN    HYDROcodone-acetaminophen (NORCO) 5-325 mg per tablet 1-2 Tab  1-2 Tab Oral Q4H PRN    ondansetron (ZOFRAN) injection 4 mg  4 mg IntraVENous Q4H PRN    docusate sodium (COLACE) capsule 100 mg  100 mg Oral BID    sodium chloride (NS) flush 5-10 mL  5-10 mL IntraVENous Q8H    sodium chloride (NS) flush 5-10 mL  5-10 mL IntraVENous PRN    acetaminophen (TYLENOL) tablet 650 mg  650 mg Oral Q6H PRN    ondansetron (ZOFRAN ODT) tablet 4 mg  4 mg Oral Q6H PRN    glucose chewable tablet 16 g  4 Tab Oral PRN    dextrose (D50W) injection syrg 12.5-25 g  12.5-25 g IntraVENous PRN    glucagon (GLUCAGEN) injection 1 mg  1 mg IntraMUSCular PRN    hydrALAZINE (APRESOLINE) 20 mg/mL injection 10 mg  10 mg IntraVENous Q6H PRN    albuterol (PROVENTIL VENTOLIN) nebulizer solution 2.5 mg  2.5 mg Nebulization Q6H PRN    cholecalciferol (VITAMIN D3) tablet 1,000 Units  1,000 Units Oral DAILY    influenza vaccine 2017-18 (3 yrs+)(PF) (FLUZONE QUAD/FLUARIX QUAD) injection 0.5 mL  0.5 mL IntraMUSCular PRIOR TO DISCHARGE

## 2017-10-19 NOTE — PROGRESS NOTES
General Surgery End of Shift Nursing Note    Bedside shift change report given to Rosalba Dodd (oncoming nurse) by JOCELYN BONILLA (offgoing nurse). Report included the following information SBAR, Intake/Output, MAR and Accordion. Shift worked:   7p-7a   Significant changes during shift:    Bili and liver function improving, cont on ivf, oob to BR, voiding, VSS   Non-emergent issues for physician to address:        Number times ambulated in hallway past shift: 0, ambulated to BR x4    Number of times OOB to chair past shift: 0    Pain Management:  Current medication: norco  Patient states pain is manageable on current pain medication: YES    GI:    Current diet:  DIET NPO    Tolerating current diet: YES  Passing flatus: YES  Last Bowel Movement: 10/16/17   Appearance:     Respiratory:    Incentive Spirometer at bedside: YES  Patient instructed on use: YES    Patient Safety:    Falls Score: 3  Bed Alarm On? Yes  Sitter?  No    Susanne A Abdiaziz

## 2017-10-19 NOTE — PROGRESS NOTES
Admit Date: 10/13/2017  POD 1 Day Post-Op  Status/Post:  Procedure(s):  ENDOSCOPIC RETROGRADE CHOLANGIOPANCREATOGRAPHY WITH BIE DUCT BRUSHINGS AND STENT PLACEMENT    Assessment:     Principal Problem:    Acute pancreatitis (10/13/2017)    Active Problems:    Gallstones (2014)        Plan/Recommendations/Medical Decision Making:     Feeling well  eleazar coming down    Agree with DC home this afternoon if tolerates po    Discussed with wife.      -------------------------------------------------------------------------------------------------------------------      Subjective:     Patient has no new complaints. no nausea      Objective:     Blood pressure 178/80, pulse 63, temperature 98 °F (36.7 °C), resp. rate 26, height 5' 3\" (1.6 m), weight 74.4 kg (164 lb), SpO2 93 %. Temp (24hrs), Av.8 °F (36.6 °C), Min:97.2 °F (36.2 °C), Max:98.7 °F (37.1 °C)      Physical Exam:   Abdominal exam: soft  non-distended  appropriatly tender.   Wound: clean, dry, no drainage    Labs:   Recent Results (from the past 24 hour(s))   GLUCOSE, POC    Collection Time: 10/18/17 11:51 AM   Result Value Ref Range    Glucose (POC) 79 65 - 100 mg/dL    Performed by InCytusson (PCT)    GLUCOSE, POC    Collection Time: 10/18/17 12:35 PM   Result Value Ref Range    Glucose (POC) 78 65 - 100 mg/dL    Performed by InCytusson (PCT)    GLUCOSE, POC    Collection Time: 10/18/17  1:30 PM   Result Value Ref Range    Glucose (POC) 112 (H) 65 - 100 mg/dL    Performed by InCytusson (PCT)    GLUCOSE, POC    Collection Time: 10/18/17  2:30 PM   Result Value Ref Range    Glucose (POC) 80 65 - 100 mg/dL    Performed by Manpreet Palomino    GLUCOSE, POC    Collection Time: 10/18/17  5:41 PM   Result Value Ref Range    Glucose (POC) 86 65 - 100 mg/dL    Performed by Vinicius Esparza    GLUCOSE, POC    Collection Time: 10/18/17  8:57 PM   Result Value Ref Range    Glucose (POC) 90 65 - 100 mg/dL    Performed by Chris Mckenzie (PCT)    CBC W/O DIFF    Collection Time: 10/19/17  3:30 AM   Result Value Ref Range    WBC 7.6 4.1 - 11.1 K/uL    RBC 4.19 4. 10 - 5.70 M/uL    HGB 12.5 12.1 - 17.0 g/dL    HCT 37.0 36.6 - 50.3 %    MCV 88.3 80.0 - 99.0 FL    MCH 29.8 26.0 - 34.0 PG    MCHC 33.8 30.0 - 36.5 g/dL    RDW 13.2 11.5 - 14.5 %    PLATELET 293 494 - 397 K/uL   METABOLIC PANEL, COMPREHENSIVE    Collection Time: 10/19/17  3:30 AM   Result Value Ref Range    Sodium 138 136 - 145 mmol/L    Potassium 4.0 3.5 - 5.1 mmol/L    Chloride 106 97 - 108 mmol/L    CO2 23 21 - 32 mmol/L    Anion gap 9 5 - 15 mmol/L    Glucose 101 (H) 65 - 100 mg/dL    BUN 13 6 - 20 MG/DL    Creatinine 0.93 0.70 - 1.30 MG/DL    BUN/Creatinine ratio 14 12 - 20      GFR est AA >60 >60 ml/min/1.73m2    GFR est non-AA >60 >60 ml/min/1.73m2    Calcium 8.1 (L) 8.5 - 10.1 MG/DL    Bilirubin, total 1.8 (H) 0.2 - 1.0 MG/DL    ALT (SGPT) 96 (H) 12 - 78 U/L    AST (SGOT) 63 (H) 15 - 37 U/L    Alk.  phosphatase 293 (H) 45 - 117 U/L    Protein, total 5.9 (L) 6.4 - 8.2 g/dL    Albumin 2.5 (L) 3.5 - 5.0 g/dL    Globulin 3.4 2.0 - 4.0 g/dL    A-G Ratio 0.7 (L) 1.1 - 2.2     LIPASE    Collection Time: 10/19/17  3:30 AM   Result Value Ref Range    Lipase 777 (H) 73 - 393 U/L   GLUCOSE, POC    Collection Time: 10/19/17  8:00 AM   Result Value Ref Range    Glucose (POC) 100 65 - 100 mg/dL    Performed by Zara Rendon (PCT)        Data Review

## 2017-10-19 NOTE — PROGRESS NOTES
CM attempted to contact Erick Yanez at [facility] ([phone]) but was unable to reach due to the line being busy. CM will continue to follow-up and attempt at a later time in order to arrange transportation for the pt this afternoon for discharge.     Mariam Lozano, MAX, [address] [place] [address]   [phone]

## 2017-10-19 NOTE — PROGRESS NOTES
Problem: Falls - Risk of  Goal: *Absence of Falls  Document Valeria Fall Risk and appropriate interventions in the flowsheet.    Outcome: Progressing Towards Goal  Fall Risk Interventions:  Mobility Interventions: Bed/chair exit alarm, Patient to call before getting OOB         Medication Interventions: Patient to call before getting OOB, Bed/chair exit alarm, Teach patient to arise slowly    Elimination Interventions: Bed/chair exit alarm, Call light in reach, Patient to call for help with toileting needs

## 2017-11-06 ENCOUNTER — OFFICE VISIT (OUTPATIENT)
Dept: SURGERY | Age: 82
End: 2017-11-06

## 2017-11-06 VITALS
OXYGEN SATURATION: 96 % | HEIGHT: 63 IN | TEMPERATURE: 96.5 F | BODY MASS INDEX: 27.29 KG/M2 | DIASTOLIC BLOOD PRESSURE: 51 MMHG | SYSTOLIC BLOOD PRESSURE: 136 MMHG | WEIGHT: 154 LBS

## 2017-11-06 DIAGNOSIS — Z09 POSTOPERATIVE EXAMINATION: Primary | ICD-10-CM

## 2017-11-06 NOTE — PROGRESS NOTES
Chief Complaint   Patient presents with    Surgical Follow-up     PO  laparoscopic cholecystectomy with intraoperative cholangiogram 10/13/17   ERCP with stent    Path:  mild chronic cholecystitis   Benign bile duct    Tolerating PO  Pain controlled  Taking no pain meds  complete resolution of preoperative symptoms  Appetite coming back      Physical Exam:   Abdominal exam: soft  non-distended  Non- tender. Wound: clean, dry, no drainage    Doing well  Continue unrestricted activity.       Follow-up: Dr. Gwen Millard for stent removal 3-6 months      Lamond Krabbe MD FACS

## 2017-11-06 NOTE — MR AVS SNAPSHOT
Visit Information Date & Time Provider Department Dept. Phone Encounter #  
 11/6/2017  2:00 PM Shirley Hamilton MD [facility] [id number] Your Appointments 11/30/2017  1:30 PM  
Follow Up with MD Jordan Yeager Diabetes and Endocrinology [facility]) Appt Note: f/u visit [facility] [address] [id number] [address] [place] [address] Road Upcoming Health Maintenance Date Due DTaP/Tdap/Td series (1 - Tdap) 5/22/1956 ZOSTER VACCINE AGE 60> 3/22/1995 GLAUCOMA SCREENING Q2Y 5/22/2000 Pneumococcal 65+ Low/Medium Risk (1 of 2 - PCV13) 5/22/2000 MEDICARE YEARLY EXAM 5/22/2000 INFLUENZA AGE 9 TO ADULT 8/1/2017 Allergies as of 11/6/2017  Review Complete On: 11/6/2017 By: Shirley Hamilton MD  
 No Known Allergies Current Immunizations  Never Reviewed No immunizations on file. Not reviewed this visit You Were Diagnosed With   
  
 Codes Comments Postoperative examination    -  Primary ICD-10-CM: H23 ICD-9-CM: V67.00 Vitals BP Temp Height(growth percentile) Weight(growth percentile) SpO2 BMI  
 136/51 (BP 1 Location: Right arm, BP Patient Position: Sitting) 96.5 °F (35.8 °C) 5' 3\" (1.6 m) 154 lb (69.9 kg) 96% 27.28 kg/m2 Smoking Status Never Smoker Vitals History BMI and BSA Data Body Mass Index Body Surface Area  
 27.28 kg/m 2 1.76 m 2 Your Updated Medication List  
  
   
This list is accurate as of: 11/6/17  3:38 PM.  Always use your most recent med list. amLODIPine 5 mg tablet Commonly known as:  Adriana Blade Take 5 mg by mouth daily. docusate sodium 100 mg capsule Commonly known as:  Milady Lara Take 1 Cap by mouth two (2) times daily as needed for Constipation. FLONASE 50 mcg/actuation nasal spray Generic drug:  fluticasone 1 [place] by Both Nostrils route daily as needed for Rhinitis or Allergies. HYDROcodone-acetaminophen 5-325 mg per tablet Commonly known as:  Mele Fuchs Take 1 Tab by mouth every eight (8) hours as needed. Max Daily Amount: 3 Tabs. ibuprofen 600 mg tablet Commonly known as:  MOTRIN Take 600 mg by mouth two (2) times daily as needed for Pain (Knee pain). lisinopril 20 mg tablet Commonly known as:  Vera Shames Take 20 mg by mouth daily. loratadine 10 mg tablet Commonly known as:  Panda Seal Take 10 mg by mouth daily. montelukast 10 mg tablet Commonly known as:  SINGULAIR Take 10 mg by mouth daily. MUCINEX 600 mg ER tablet Generic drug:  guaiFENesin ER Take 600 mg by mouth daily as needed for Congestion. ondansetron 4 mg disintegrating tablet Commonly known as:  ZOFRAN ODT Take 1 Tab by mouth every six (6) hours as needed. * VENTOLIN HFA 90 mcg/actuation inhaler Generic drug:  albuterol Take 2 Puffs by inhalation every four (4) hours as needed for Wheezing or Shortness of Breath (Bronchitis). * albuterol 2.5 mg /3 mL (0.083 %) nebulizer solution Commonly known as:  PROVENTIL VENTOLIN  
2.5 mg by Nebulization route every six (6) hours as needed for Wheezing. Inhale 3 mL by nebulizer every six (6) hours AS NEEDED for COPD while awake VITAMIN D3 1,000 unit Cap Generic drug:  cholecalciferol Take 1,000 Units by mouth daily. VOLTAREN 1 % Gel Generic drug:  diclofenac Apply  to affected area two (2) times daily as needed. Apply a thin layer to the foot/knee two (2) times daily as needed for pain * Notice: This list has 2 medication(s) that are the same as other medications prescribed for you. Read the directions carefully, and ask your doctor or other care provider to review them with you. Introducing [facility]! [facility] introduces [employer] patient portal. Now you can access parts of your medical record, email your doctor's office, and request medication refills online. 1. In your internet browser, go to [email or web address] 2. Click on the First Time User? Click Here link in the Sign In box. You will see the New Member Sign Up page. 3. Enter your [employer] Access Code exactly as it appears below. You will not need to use this code after youve completed the sign-up process. If you do not sign up before the expiration date, you must request a new code. · [employer] Access Code: [email or web address] Expires: 1/11/2018  4:59 AM 
 
4. Enter the last four digits of your Social Security Number (xxxx) and Date of Birth (mm/dd/yyyy) as indicated and click Submit. You will be taken to the next sign-up page. 5. Create a [employer] ID. This will be your [employer] login ID and cannot be changed, so think of one that is secure and easy to remember. 6. Create a [employer] password. You can change your password at any time. 7. Enter your Password Reset Question and Answer. This can be used at a later time if you forget your password. 8. Enter your e-mail address. You will receive e-mail notification when new information is available in [address] 9. Click Sign Up. You can now view and download portions of your medical record. 10. Click the Download Summary menu link to download a portable copy of your medical information. If you have questions, please visit the Frequently Asked Questions section of the [employer] website. Remember, [employer] is NOT to be used for urgent needs. For medical emergencies, dial 911. Now available from your iPhone and Android! Please provide this summary of care documentation to your next provider. Your primary care clinician is listed as Joe Linares. If you have any questions after today's visit, please call [phone].

## 2017-11-30 ENCOUNTER — TELEPHONE (OUTPATIENT)
Dept: ENDOCRINOLOGY | Age: 82
End: 2017-11-30

## 2017-11-30 ENCOUNTER — OFFICE VISIT (OUTPATIENT)
Dept: ENDOCRINOLOGY | Age: 82
End: 2017-11-30

## 2017-11-30 VITALS
BODY MASS INDEX: 27.85 KG/M2 | SYSTOLIC BLOOD PRESSURE: 133 MMHG | HEIGHT: 63 IN | HEART RATE: 55 BPM | DIASTOLIC BLOOD PRESSURE: 58 MMHG | WEIGHT: 157.2 LBS

## 2017-11-30 DIAGNOSIS — R74.8 ELEVATED ALKALINE PHOSPHATASE LEVEL: ICD-10-CM

## 2017-11-30 DIAGNOSIS — E21.3 HYPERPARATHYROIDISM (HCC): Primary | ICD-10-CM

## 2017-11-30 DIAGNOSIS — E83.51 HYPOCALCEMIA: ICD-10-CM

## 2017-11-30 RX ORDER — AMLODIPINE BESYLATE 10 MG/1
TABLET ORAL DAILY
COMMUNITY

## 2017-11-30 NOTE — PROGRESS NOTES
Chief Complaint   Patient presents with    Other     elevated PTH    Other     pcp confirmed and all scripts neede  to [facility]     History of Present Illness: Carol Doyle is a 80 y.o. male here for follow up of elevated PTH. Weight down 10 lbs since last visit in 12/16. Since then was admitted in 10/16 for gallstone pancreatitis and under cholecystectomy with Dr. Blanquita Steele and had a biliary stent placed by Dr. Lm Cowart that will be taken out in January. I was confused as to why he came today as I had previously seen him in 12/16 and did the evaluation below and everything came back normal and told him that I didn't need to see him back and cancelled his 6 month appt for 6/17. I called Dr. Oh Sam' office to find out the reason for the visit and his nurse, Chele Robison, told me to call the [facility] as they didn't keep any charts for these patients at his private office. I spoke with Shaylee Araujo at the [facility] and she reviewed his chart and couldn't find any reason why he was supposed to come and see me but is supposed to f/u with Dr. Blanquita Steele and Dr. Lm Cowart so I apologized to the patient for any inconvenience and told him to call back for another appointment if there was some reason Dr. Oh Sam needs him to see me. Current Outpatient Prescriptions   Medication Sig    amLODIPine (NORVASC) 10 mg tablet Take  by mouth daily.  docusate sodium (COLACE) 100 mg capsule Take 1 Cap by mouth two (2) times daily as needed for Constipation.  montelukast (SINGULAIR) 10 mg tablet Take 10 mg by mouth daily.  lisinopril (PRINIVIL, ZESTRIL) 20 mg tablet Take 20 mg by mouth daily.  cholecalciferol (VITAMIN D3) 1,000 unit cap Take 1,000 Units by mouth daily.  ibuprofen (MOTRIN) 600 mg tablet Take 600 mg by mouth two (2) times daily as needed for Pain (Knee pain).  fluticasone (FLONASE) 50 mcg/actuation nasal spray 1 [place] by Both Nostrils route daily as needed for Rhinitis or Allergies.      No current facility-administered medications for this visit. No Known Allergies     Review of Systems: Not asked today    Physical Examination:  Blood pressure 133/58, pulse (!) 55, height 5' 3\" (1.6 m), weight 157 lb 3.2 oz (71.3 kg). - General: pleasant, no distress, good eye contact   - Full exam not performed  - Psychiatric: normal mood and affect    Data Reviewed:   Component      Latest Ref Rng & Units 10/19/2017 10/18/2017           3:30 AM  3:51 AM   Sodium      136 - 145 mmol/L 138 139   Potassium      3.5 - 5.1 mmol/L 4.0 3.4 (L)   Chloride      97 - 108 mmol/L 106 106   CO2      21 - 32 mmol/L 23 28   Anion gap      5 - 15 mmol/L 9 5   Glucose      65 - 100 mg/dL 101 (H) 93   BUN      6 - 20 MG/DL 13 10   Creatinine      0.70 - 1.30 MG/DL 0.93 1.05   BUN/Creatinine ratio      12 - 20   14 10 (L)   GFR est AA      >60 ml/min/1.73m2 >60 >60   GFR est non-AA      >60 ml/min/1.73m2 >60 >60   Calcium      8.5 - 10.1 MG/DL 8.1 (L) 8.0 (L)   Bilirubin, total      0.2 - 1.0 MG/DL 1.8 (H) 3.7 (H)   ALT (SGPT)      12 - 78 U/L 96 (H) 125 (H)   AST      15 - 37 U/L 63 (H) 101 (H)   Alk. phosphatase      45 - 117 U/L 293 (H) 291 (H)   Protein, total      6.4 - 8.2 g/dL 5.9 (L) 5.7 (L)   Albumin      3.5 - 5.0 g/dL 2.5 (L) 2.6 (L)   Globulin      2.0 - 4.0 g/dL 3.4 3.1   A-G Ratio      1.1 - 2.2   0.7 (L) 0.8 (L)       Assessment/Plan:     1. Hyperparathyroidism, unspecified ([facility] 75.): in 4/16, had a slightly low calcium of 8.26 (8.5-10. 8) with a high PTH of 97.5 (13-75) and an elevated alk phos of 117 () but a normal ionized calcium of 5 (4.8-5.6).   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      2. Elevated alkaline phosphatase level:  In 6/14 there are labs in the computer from prior PCP, Dr. Daniel Michelle, that showed an elevated alk phos level of 156 and had isoenzymes that showed 84% liver fraction and 16% bone fraction. His calcium was normal at 9.1 and TSH was normal at 2.82 in 6/14. He was seen by Dr. Antione Bhardwaj in 6/14 for evaluation of possible cholecystectomy after being found to have gallstones but never had this surgery. Alk phos was 177 in 8/14 as above. [facility] and in 4/16, had an elevated alk phos of 117 (). Repeat was 140 in 12/16. It's possible some of this was from gallbladder disease as he developed gallstone pancreatitis in 10/17 and is now s/p cholecystectomy with biliary stent placement and this will be followed up by Dr. Eufemia Akhtar and Dr. Elvis Franco.  - follow on cmp     3. Hypocalcemia: level was normal at 9.1 in 6/14 and 9.3 in 8/14 but down to 8.26 in 4/16 though his ionized calcium was normal.  Repeat was normal at 9.4 in 12/16. This may have been due to underlying vitamin D deficiency. Level was low at 8.1 in 10/17 due to pancreatitis but likely should come back to normal now that he has had surgery. - check vitamin D 25-OH level today      We spent 15 minutes of face to face time together and > 50% of the time was spent in coordination of care with the [facility] hospital. Patient Instructions   1) It appears you were sent back to me in error so I apologize for any inconvenience. Your vitamin D and calcium and PTH (parathyroid hormone) were normal on repeat last year and you didn't need to come back and see me. If there is some other reason that Dr. Jose Edwards needs me to see you, please call back for another appointment and ensure we know what he needs us to see you for. Follow-up Disposition:  Return if symptoms worsen or fail to improve.     Copy sent to:  Dr. Prema Bhardwaj via [facility]

## 2017-11-30 NOTE — PATIENT INSTRUCTIONS
1) It appears you were sent back to me in error so I apologize for any inconvenience. Your vitamin D and calcium and PTH (parathyroid hormone) were normal on repeat last year and you didn't need to come back and see me. If there is some other reason that Dr. Anabelle Ignacio needs me to see you, please call back for another appointment and ensure we know what he needs us to see you for.

## 2017-11-30 NOTE — TELEPHONE ENCOUNTER
Please call Dr. Fawad Figueroa' office to confirm receipt of this note. If they didn't get this, please manually fax. Thanks.

## 2017-11-30 NOTE — MR AVS SNAPSHOT
Visit Information Date & Time Provider Department Dept. Phone Encounter #  
 11/30/2017  1:30 PM Jenn Kevin, [address] [place] [address] Diabetes and Endocrinology [phone] [id number] Follow-up Instructions Return if symptoms worsen or fail to improve. Upcoming Health Maintenance Date Due DTaP/Tdap/Td series (1 - Tdap) 5/22/1956 ZOSTER VACCINE AGE 60> 3/22/1995 GLAUCOMA SCREENING Q2Y 5/22/2000 Pneumococcal 65+ Low/Medium Risk (1 of 2 - PCV13) 5/22/2000 MEDICARE YEARLY EXAM 5/22/2000 Influenza Age 5 to Adult 8/1/2017 Allergies as of 11/30/2017  Review Complete On: 11/30/2017 By: Jenn Kevin MD  
 No Known Allergies Current Immunizations  Never Reviewed No immunizations on file. Not reviewed this visit Vitals BP Pulse Height(growth percentile) Weight(growth percentile) BMI Smoking Status 133/58 (!) 55 5' 3\" (1.6 m) 157 lb 3.2 oz (71.3 kg) 27.85 kg/m2 Never Smoker Vitals History BMI and BSA Data Body Mass Index Body Surface Area  
 27.85 kg/m 2 1.78 m 2 Your Updated Medication List  
  
   
This list is accurate as of: 11/30/17  2:20 PM.  Always use your most recent med list. amLODIPine 10 mg tablet Commonly known as:  Tomasa Campuzano Take  by mouth daily. docusate sodium 100 mg capsule Commonly known as:  David Call Take 1 Cap by mouth two (2) times daily as needed for Constipation. FLONASE 50 mcg/actuation nasal spray Generic drug:  fluticasone 1 [place] by Both Nostrils route daily as needed for Rhinitis or Allergies. ibuprofen 600 mg tablet Commonly known as:  MOTRIN Take 600 mg by mouth two (2) times daily as needed for Pain (Knee pain). lisinopril 20 mg tablet Commonly known as:  Alexis Winkler Take 20 mg by mouth daily. montelukast 10 mg tablet Commonly known as:  SINGULAIR Take 10 mg by mouth daily. VITAMIN D3 1,000 unit Cap Generic drug:  cholecalciferol Take 1,000 Units by mouth daily. Follow-up Instructions Return if symptoms worsen or fail to improve. To-Do List   
 01/02/2018 8:00 AM  
  Appointment with [facility] ROOM P1 at [address] ([phone]) Patient Instructions 1) It appears you were sent back to me in error so I apologize for any inconvenience. Your vitamin D and calcium and PTH (parathyroid hormone) were normal on repeat last year and you didn't need to come back and see me. If there is some other reason that Dr. Oh Sam needs me to see you, please call back for another appointment and ensure we know what he needs us to see you for. Introducing [facility]! Geoffrey Champion introduces [employer] patient portal. Now you can access parts of your medical record, email your doctor's office, and request medication refills online. 1. In your internet browser, go to [email or web address] 2. Click on the First Time User? Click Here link in the Sign In box. You will see the New Member Sign Up page. 3. Enter your [employer] Access Code exactly as it appears below. You will not need to use this code after youve completed the sign-up process. If you do not sign up before the expiration date, you must request a new code. · [employer] Access Code: [email or web address] Expires: 1/11/2018  4:59 AM 
 
4. Enter the last four digits of your Social Security Number (xxxx) and Date of Birth (mm/dd/yyyy) as indicated and click Submit. You will be taken to the next sign-up page. 5. Create a [employer] ID. This will be your [employer] login ID and cannot be changed, so think of one that is secure and easy to remember. 6. Create a [employer] password. You can change your password at any time. 7. Enter your Password Reset Question and Answer. This can be used at a later time if you forget your password. 8. Enter your e-mail address. You will receive e-mail notification when new information is available in [address] 9. Click Sign Up. You can now view and download portions of your medical record. 10. Click the Download Summary menu link to download a portable copy of your medical information. If you have questions, please visit the Frequently Asked Questions section of the [employer] website. Remember, [employer] is NOT to be used for urgent needs. For medical emergencies, dial 911. Now available from your iPhone and Android! Please provide this summary of care documentation to your next provider. Your primary care clinician is listed as Sam Lung. If you have any questions after today's visit, please call [phone].

## 2017-12-29 NOTE — PERIOP NOTES
Spoke to [facility] in Dr. Katie Castrejon office. Patient has labs and ekg from 10/2017. I asked if they need to be repeated per Dr. Elodia Metzger. Will cancel PAT appointment and make patient a phone assessment. [facility] called and notified.

## 2018-01-08 RX ORDER — EPINEPHRINE 0.1 MG/ML
1 INJECTION INTRACARDIAC; INTRAVENOUS
Status: CANCELLED | OUTPATIENT
Start: 2018-01-08 | End: 2018-01-08

## 2018-01-08 RX ORDER — FENTANYL CITRATE 50 UG/ML
25 INJECTION, SOLUTION INTRAMUSCULAR; INTRAVENOUS
Status: CANCELLED | OUTPATIENT
Start: 2018-01-08 | End: 2018-01-08

## 2018-01-08 RX ORDER — FLUMAZENIL 0.1 MG/ML
0.2 INJECTION INTRAVENOUS
Status: CANCELLED | OUTPATIENT
Start: 2018-01-08 | End: 2018-01-08

## 2018-01-08 RX ORDER — HYDROCODONE BITARTRATE AND ACETAMINOPHEN 5; 325 MG/1; MG/1
TABLET ORAL
COMMUNITY
End: 2020-11-25

## 2018-01-08 RX ORDER — BENZONATATE 100 MG/1
100 CAPSULE ORAL
COMMUNITY
End: 2020-11-25

## 2018-01-08 RX ORDER — MIDAZOLAM HYDROCHLORIDE 1 MG/ML
.25-5 INJECTION, SOLUTION INTRAMUSCULAR; INTRAVENOUS
Status: CANCELLED | OUTPATIENT
Start: 2018-01-08 | End: 2018-01-08

## 2018-01-08 RX ORDER — NALOXONE HYDROCHLORIDE 0.4 MG/ML
0.4 INJECTION, SOLUTION INTRAMUSCULAR; INTRAVENOUS; SUBCUTANEOUS
Status: CANCELLED | OUTPATIENT
Start: 2018-01-08 | End: 2018-01-08

## 2018-01-08 RX ORDER — ATROPINE SULFATE 0.1 MG/ML
0.5 INJECTION INTRAVENOUS
Status: CANCELLED | OUTPATIENT
Start: 2018-01-08 | End: 2018-01-08

## 2018-01-08 RX ORDER — ALBUTEROL SULFATE 90 UG/1
AEROSOL, METERED RESPIRATORY (INHALATION)
COMMUNITY

## 2018-01-08 RX ORDER — ALBUTEROL SULFATE 0.83 MG/ML
SOLUTION RESPIRATORY (INHALATION) ONCE
COMMUNITY

## 2018-01-08 RX ORDER — DEXTROMETHORPHAN/PSEUDOEPHED 2.5-7.5/.8
1.2 DROPS ORAL
Status: CANCELLED | OUTPATIENT
Start: 2018-01-08

## 2018-01-08 RX ORDER — ONDANSETRON 4 MG/1
4 TABLET, ORALLY DISINTEGRATING ORAL
COMMUNITY

## 2018-01-08 RX ORDER — CEFAZOLIN SODIUM IN 0.9 % NACL 2 G/100 ML
2 PLASTIC BAG, INJECTION (ML) INTRAVENOUS ONCE
Status: CANCELLED | OUTPATIENT
Start: 2018-01-09 | End: 2018-01-09

## 2018-01-08 NOTE — PERIOP NOTES
[facility]  Preoperative Instructions        Surgery Date 1/9/18          Time of Arrival 7:00am    1. On the day of your surgery, please report to the Surgical Services Registration Desk and sign in at your designated time. The Surgery Center is located to the right of the Emergency Room. 2. You must have someone with you to drive you home. You should not drive a car for 24 hours following surgery. Please make arrangements for a friend or family member to stay with you for the first 24 hours after your surgery. 3. Do not have anything to eat or drink (including water, gum, mints, coffee, juice) after midnight 1/8/18 . ? This may not apply to medications prescribed by your physician. ?(Please note below the special instructions with medications to take the morning of your procedure.)    4. We recommend you do not drink any alcoholic beverages for 24 hours before and after your surgery. 5. Contact your surgeons office for instructions on the following medications: non-steroidal anti-inflammatory drugs (i.e. Advil, Aleve), vitamins, and supplements. (Some surgeons will want you to stop these medications prior to surgery and others may allow you to take them)  **If you are currently taking Plavix, Coumadin, Aspirin and/or other blood-thinning agents, contact your surgeon for instructions. ** Your surgeon will partner with the physician prescribing these medications to determine if it is safe to stop or if you need to continue taking. Please do not stop taking these medications without instructions from your surgeon    6. Wear comfortable clothes. Wear glasses instead of contacts. Do not bring any money or jewelry. Please bring picture ID, insurance card, and any prearranged co-payment or hospital payment. Do not wear make-up, particularly mascara the morning of your surgery. Do not wear nail polish, particularly if you are having foot /hand surgery.   Wear your hair loose or down, no ponytails, buns, damon pins or clips. All body piercings must be removed. Please shower with antibacterial soap for three consecutive days before and on the morning of surgery, but do not apply any lotions, powders or deodorants after the shower on the day of surgery. Please use a fresh towels after each shower. Please sleep in clean clothes and change bed linens the night before surgery. Please do not shave for 48 hours prior to surgery. Shaving of the face is acceptable. 7. You should understand that if you do not follow these instructions your surgery may be cancelled. If your physical condition changes (I.e. fever, cold or flu) please contact your surgeon as soon as possible. 8. It is important that you be on time. If a situation occurs where you may be late, please call [phone] (OR Holding Area). 9. If you have any questions and or problems, please call [phone] (Pre-admission Testing). 10. Your surgery time may be subject to change. You will receive a phone call the evening prior if your time changes. 11.  If having outpatient surgery, you must have someone to drive you here, stay with you during the duration of your stay, and to drive you home at time of discharge. 12.   In an effort to improve the efficiency, privacy, and safety for all of our Pre-op patients visitors are not allowed in the Holding area. Once you arrive and are registered your family/visitors will be asked to remain in the waiting room. The Pre-op staff will get you from the Surgical Waiting Area and will explain to you and your family/visitors that the Pre-op phase is beginning. The staff will answer any questions and provide instructions for tracking of the patient, by use of the existing tracking number and color-coded status board in the waiting room.   At this time the staff will also ask for your designated spokesperson information in the event that the physician or staff need to provide an update or obtain any pertinent information. The designated spokesperson will be notified if the physician needs to speak to family during the pre-operative phase. If at any time your family/visitors has questions or concerns they may approach the volunteer desk in the waiting area for assistance. Special Instructions:May use inhaler as needed. MEDICATIONS TO TAKE THE MORNING OF SURGERY WITH A SIP OF WATER:Amlodipine. I understand a pre-operative phone call will be made to verify my surgery time. In the event that I am not available, I give permission for a message to be left on my answering service and/or with another person?  Yes          ___________________      __________   _________    (Signature of Patient)             (Witness)                (Date and Time)

## 2018-01-08 NOTE — PERIOP NOTES
Spoke w Mauricio Nicholson (pt's caregiver) at [facility]. Pt's MAR requested to be faxed. Mauricio Nicholson advised pt does sign his own consents and [facility] will be transporting the patient. Requested to review pt's assessment or family member to call to do so. Mauricio Nicholson advised the assessment can only be completed by the nursing supervisor and she is unavailable at this time. Supervisor number requested by KATHY and was advised by Mauricio Nicholson she could not provide number but if questions are faxed she would give it to the supervisor. Mauricio Nicholson will fax [facility] and KATHY will fax return instructions.

## 2018-01-09 ENCOUNTER — APPOINTMENT (OUTPATIENT)
Dept: GENERAL RADIOLOGY | Age: 83
End: 2018-01-09
Attending: INTERNAL MEDICINE
Payer: MEDICARE

## 2018-01-09 ENCOUNTER — ANESTHESIA (OUTPATIENT)
Dept: SURGERY | Age: 83
End: 2018-01-09
Payer: MEDICARE

## 2018-01-09 ENCOUNTER — HOSPITAL ENCOUNTER (OUTPATIENT)
Age: 83
Setting detail: OUTPATIENT SURGERY
Discharge: HOME | End: 2018-01-09
Attending: INTERNAL MEDICINE | Admitting: INTERNAL MEDICINE
Payer: MEDICARE

## 2018-01-09 ENCOUNTER — ANESTHESIA EVENT (OUTPATIENT)
Dept: SURGERY | Age: 83
End: 2018-01-09
Payer: MEDICARE

## 2018-01-09 VITALS
HEIGHT: 63 IN | DIASTOLIC BLOOD PRESSURE: 59 MMHG | RESPIRATION RATE: 16 BRPM | OXYGEN SATURATION: 93 % | TEMPERATURE: 97.6 F | WEIGHT: 158.07 LBS | HEART RATE: 57 BPM | BODY MASS INDEX: 28.01 KG/M2 | SYSTOLIC BLOOD PRESSURE: 160 MMHG

## 2018-01-09 LAB
ABO + RH BLD: NORMAL
BLOOD GROUP ANTIBODIES SERPL: NORMAL
SPECIMEN EXP DATE BLD: NORMAL

## 2018-01-09 PROCEDURE — 74011636320 HC RX REV CODE- 636/320: Performed by: INTERNAL MEDICINE

## 2018-01-09 PROCEDURE — 77030018836 HC SOL IRR NACL ICUM -A: Performed by: INTERNAL MEDICINE

## 2018-01-09 PROCEDURE — 74011250636 HC RX REV CODE- 250/636

## 2018-01-09 PROCEDURE — 77030009038 HC CATH BILI STN RTVR BSC -C: Performed by: INTERNAL MEDICINE

## 2018-01-09 PROCEDURE — 86900 BLOOD TYPING SEROLOGIC ABO: CPT | Performed by: ANESTHESIOLOGY

## 2018-01-09 PROCEDURE — 77030026438 HC STYL ET INTUB CARD -A: Performed by: NURSE ANESTHETIST, CERTIFIED REGISTERED

## 2018-01-09 PROCEDURE — 77030032490 HC SLV COMPR SCD KNE COVD -B: Performed by: INTERNAL MEDICINE

## 2018-01-09 PROCEDURE — 77030011640 HC PAD GRND REM COVD -A: Performed by: INTERNAL MEDICINE

## 2018-01-09 PROCEDURE — 77030008684 HC TU ET CUF COVD -B: Performed by: NURSE ANESTHETIST, CERTIFIED REGISTERED

## 2018-01-09 PROCEDURE — 36415 COLL VENOUS BLD VENIPUNCTURE: CPT | Performed by: ANESTHESIOLOGY

## 2018-01-09 PROCEDURE — C1769 GUIDE WIRE: HCPCS | Performed by: INTERNAL MEDICINE

## 2018-01-09 PROCEDURE — 76210000020 HC REC RM PH II FIRST 0.5 HR: Performed by: INTERNAL MEDICINE

## 2018-01-09 PROCEDURE — 77030020782 HC GWN BAIR PAWS FLX 3M -B

## 2018-01-09 PROCEDURE — 77030010104 HC SEAL PRT ENDOSC BYRN -B: Performed by: INTERNAL MEDICINE

## 2018-01-09 PROCEDURE — 77030020268 HC MISC GENERAL SUPPLY: Performed by: INTERNAL MEDICINE

## 2018-01-09 PROCEDURE — 77030018846 HC SOL IRR STRL H20 ICUM -A: Performed by: INTERNAL MEDICINE

## 2018-01-09 PROCEDURE — 74011250636 HC RX REV CODE- 250/636: Performed by: ANESTHESIOLOGY

## 2018-01-09 PROCEDURE — 76010000138 HC OR TIME 0.5 TO 1 HR: Performed by: INTERNAL MEDICINE

## 2018-01-09 PROCEDURE — 74011000250 HC RX REV CODE- 250

## 2018-01-09 PROCEDURE — 76210000006 HC OR PH I REC 0.5 TO 1 HR: Performed by: INTERNAL MEDICINE

## 2018-01-09 PROCEDURE — 76060000032 HC ANESTHESIA 0.5 TO 1 HR: Performed by: INTERNAL MEDICINE

## 2018-01-09 PROCEDURE — 74330 X-RAY BILE/PANC ENDOSCOPY: CPT

## 2018-01-09 PROCEDURE — 77030007288 HC DEV LOK BILI BSC -A: Performed by: INTERNAL MEDICINE

## 2018-01-09 RX ORDER — SODIUM CHLORIDE, SODIUM LACTATE, POTASSIUM CHLORIDE, CALCIUM CHLORIDE 600; 310; 30; 20 MG/100ML; MG/100ML; MG/100ML; MG/100ML
25 INJECTION, SOLUTION INTRAVENOUS CONTINUOUS
Status: DISCONTINUED | OUTPATIENT
Start: 2018-01-09 | End: 2018-01-09 | Stop reason: HOSPADM

## 2018-01-09 RX ORDER — DEXAMETHASONE SODIUM PHOSPHATE 4 MG/ML
INJECTION, SOLUTION INTRA-ARTICULAR; INTRALESIONAL; INTRAMUSCULAR; INTRAVENOUS; SOFT TISSUE AS NEEDED
Status: DISCONTINUED | OUTPATIENT
Start: 2018-01-09 | End: 2018-01-09 | Stop reason: HOSPADM

## 2018-01-09 RX ORDER — SODIUM CHLORIDE 0.9 % (FLUSH) 0.9 %
5-10 SYRINGE (ML) INJECTION EVERY 8 HOURS
Status: DISCONTINUED | OUTPATIENT
Start: 2018-01-09 | End: 2018-01-09 | Stop reason: HOSPADM

## 2018-01-09 RX ORDER — FENTANYL CITRATE 50 UG/ML
INJECTION, SOLUTION INTRAMUSCULAR; INTRAVENOUS AS NEEDED
Status: DISCONTINUED | OUTPATIENT
Start: 2018-01-09 | End: 2018-01-09 | Stop reason: HOSPADM

## 2018-01-09 RX ORDER — FENTANYL CITRATE 50 UG/ML
25 INJECTION, SOLUTION INTRAMUSCULAR; INTRAVENOUS
Status: CANCELLED | OUTPATIENT
Start: 2018-01-09

## 2018-01-09 RX ORDER — DIPHENHYDRAMINE HYDROCHLORIDE 50 MG/ML
12.5 INJECTION, SOLUTION INTRAMUSCULAR; INTRAVENOUS AS NEEDED
Status: CANCELLED | OUTPATIENT
Start: 2018-01-09 | End: 2018-01-09

## 2018-01-09 RX ORDER — SODIUM CHLORIDE 9 MG/ML
75 INJECTION, SOLUTION INTRAVENOUS CONTINUOUS
Status: DISPENSED | OUTPATIENT
Start: 2018-01-09 | End: 2018-01-09

## 2018-01-09 RX ORDER — HYDROMORPHONE HYDROCHLORIDE 1 MG/ML
0.2 INJECTION, SOLUTION INTRAMUSCULAR; INTRAVENOUS; SUBCUTANEOUS
Status: CANCELLED | OUTPATIENT
Start: 2018-01-09

## 2018-01-09 RX ORDER — SODIUM CHLORIDE, SODIUM LACTATE, POTASSIUM CHLORIDE, CALCIUM CHLORIDE 600; 310; 30; 20 MG/100ML; MG/100ML; MG/100ML; MG/100ML
25 INJECTION, SOLUTION INTRAVENOUS CONTINUOUS
Status: CANCELLED | OUTPATIENT
Start: 2018-01-09

## 2018-01-09 RX ORDER — SUCCINYLCHOLINE CHLORIDE 20 MG/ML
INJECTION INTRAMUSCULAR; INTRAVENOUS AS NEEDED
Status: DISCONTINUED | OUTPATIENT
Start: 2018-01-09 | End: 2018-01-09 | Stop reason: HOSPADM

## 2018-01-09 RX ORDER — LIDOCAINE HYDROCHLORIDE 10 MG/ML
0.1 INJECTION, SOLUTION EPIDURAL; INFILTRATION; INTRACAUDAL; PERINEURAL AS NEEDED
Status: DISCONTINUED | OUTPATIENT
Start: 2018-01-09 | End: 2018-01-09 | Stop reason: HOSPADM

## 2018-01-09 RX ORDER — SODIUM CHLORIDE 0.9 % (FLUSH) 0.9 %
5-10 SYRINGE (ML) INJECTION AS NEEDED
Status: ACTIVE | OUTPATIENT
Start: 2018-01-09 | End: 2018-01-09

## 2018-01-09 RX ORDER — SODIUM CHLORIDE 0.9 % (FLUSH) 0.9 %
5-10 SYRINGE (ML) INJECTION AS NEEDED
Status: DISCONTINUED | OUTPATIENT
Start: 2018-01-09 | End: 2018-01-09 | Stop reason: HOSPADM

## 2018-01-09 RX ORDER — CEFAZOLIN SODIUM IN 0.9 % NACL 2 G/100 ML
PLASTIC BAG, INJECTION (ML) INTRAVENOUS AS NEEDED
Status: DISCONTINUED | OUTPATIENT
Start: 2018-01-09 | End: 2018-01-09 | Stop reason: HOSPADM

## 2018-01-09 RX ORDER — ONDANSETRON 2 MG/ML
INJECTION INTRAMUSCULAR; INTRAVENOUS AS NEEDED
Status: DISCONTINUED | OUTPATIENT
Start: 2018-01-09 | End: 2018-01-09 | Stop reason: HOSPADM

## 2018-01-09 RX ORDER — SODIUM CHLORIDE 0.9 % (FLUSH) 0.9 %
5-10 SYRINGE (ML) INJECTION AS NEEDED
Status: CANCELLED | OUTPATIENT
Start: 2018-01-09

## 2018-01-09 RX ORDER — LIDOCAINE HYDROCHLORIDE 20 MG/ML
INJECTION, SOLUTION EPIDURAL; INFILTRATION; INTRACAUDAL; PERINEURAL AS NEEDED
Status: DISCONTINUED | OUTPATIENT
Start: 2018-01-09 | End: 2018-01-09 | Stop reason: HOSPADM

## 2018-01-09 RX ORDER — PROPOFOL 10 MG/ML
INJECTION, EMULSION INTRAVENOUS AS NEEDED
Status: DISCONTINUED | OUTPATIENT
Start: 2018-01-09 | End: 2018-01-09 | Stop reason: HOSPADM

## 2018-01-09 RX ADMIN — ONDANSETRON 4 MG: 2 INJECTION INTRAMUSCULAR; INTRAVENOUS at 10:39

## 2018-01-09 RX ADMIN — FENTANYL CITRATE 50 MCG: 50 INJECTION, SOLUTION INTRAMUSCULAR; INTRAVENOUS at 10:24

## 2018-01-09 RX ADMIN — SODIUM CHLORIDE, SODIUM LACTATE, POTASSIUM CHLORIDE, AND CALCIUM CHLORIDE 25 ML/HR: 600; 310; 30; 20 INJECTION, SOLUTION INTRAVENOUS at 09:14

## 2018-01-09 RX ADMIN — SUCCINYLCHOLINE CHLORIDE 140 MG: 20 INJECTION INTRAMUSCULAR; INTRAVENOUS at 10:24

## 2018-01-09 RX ADMIN — LIDOCAINE HYDROCHLORIDE 60 MG: 20 INJECTION, SOLUTION EPIDURAL; INFILTRATION; INTRACAUDAL; PERINEURAL at 10:24

## 2018-01-09 RX ADMIN — Medication 2 G: at 10:28

## 2018-01-09 RX ADMIN — DEXAMETHASONE SODIUM PHOSPHATE 8 MG: 4 INJECTION, SOLUTION INTRA-ARTICULAR; INTRALESIONAL; INTRAMUSCULAR; INTRAVENOUS; SOFT TISSUE at 10:39

## 2018-01-09 RX ADMIN — FENTANYL CITRATE 25 MCG: 50 INJECTION, SOLUTION INTRAMUSCULAR; INTRAVENOUS at 10:17

## 2018-01-09 RX ADMIN — PROPOFOL 100 MG: 10 INJECTION, EMULSION INTRAVENOUS at 10:24

## 2018-01-09 RX ADMIN — FENTANYL CITRATE 25 MCG: 50 INJECTION, SOLUTION INTRAMUSCULAR; INTRAVENOUS at 10:45

## 2018-01-09 NOTE — PERIOP NOTES
For dc home. Vswnl. Denies pain, nausea. Went over [employer] instructions w/pt including f/up. Verbalized understanding. dc'd home.

## 2018-01-09 NOTE — PERIOP NOTES
07:55= pt had not arrived yet and was scheduled to be here at 07:00; [place], [address], called facility and person she spoke to had stated that he was still at facility at 07:00, and they thought he was supposed to leave at 07:30 via their transport. She will contact Buffy Tejada, [email or web address], to inform. 08:10= per [place], [address] Florentino called from Registration to state that pt was \"dropped off at Outpatient Registration and left; he is very confused and doesn't seem to know what's going on. \" BERNABE Treviño, called Elyse Westward to  pt and take to [facility] to get registered. BERNABE Treviño Charge informed Crystal Nance Charge. 08:30= pt arrived to room 17 in Pre Op.    09:05= labs (type and screen) drawn from new IV site in [facility] and sent to lab for eval.    09:12= security called to secure wallet with money, license and watch.

## 2018-01-09 NOTE — ANESTHESIA POSTPROCEDURE EVALUATION
Post-Anesthesia Evaluation and Assessment    Patient: Leslye Quinones MRN: [id number]  SSN: [id number]    YOB: 1935  Age: 80 y.o. Sex: male       Cardiovascular Function/Vital Signs  Visit Vitals    /63 (BP 1 Location: Left arm, BP Patient Position: At rest)    Pulse 64    Temp 36.4 °C (97.6 °F)    Resp 20    Ht 5' 3\" (1.6 m)    Wt 71.7 kg (158 lb 1.1 oz)    SpO2 92%    BMI 28 kg/m2       Patient is status post general anesthesia for Procedure(s):  ENDOSCOPIC RETROGRADE CHOLANGIOPANCREATOGRAPHY STENT REMOVAL. Nausea/Vomiting: None    Postoperative hydration reviewed and adequate. Pain:  Pain Scale 1: Numeric (0 - 10) (01/09/18 1120)  Pain Intensity 1: 0 (01/09/18 1120)   Managed    Neurological Status:   Neuro (WDL): Exceptions to WDL (01/09/18 1110)  Neuro  Neurologic State: Drowsy; Eyes open to voice (01/09/18 1110)  Orientation Level: Oriented X4 (01/09/18 1110)  Cognition: Follows commands (01/09/18 1110)  Speech: Clear (01/09/18 1110)  LUE Motor Response: Purposeful (01/09/18 1110)  LLE Motor Response: Purposeful;Weak (01/09/18 1110)  RUE Motor Response: Purposeful (01/09/18 1110)  RLE Motor Response: Purposeful;Weak (01/09/18 1110)   At baseline    Mental Status and Level of Consciousness: Arousable    Pulmonary Status:   O2 Device: Nasal cannula (01/09/18 1130)   Adequate oxygenation and airway patent    Complications related to anesthesia: None    Post-anesthesia assessment completed.  No concerns    Signed By: Andrew Cooper MD     January 9, 2018

## 2018-01-09 NOTE — H&P
Gastroenterology Outpatient History and Physical    Patient: Hannah Haider    Physician: Lindsay Alejandre MD    Chief Complaint: distal biliary stricture, abnl biliary radiograph  History of Present Illness: 81yo M with distal biliary stricture as seen on ERCP and abnl biliary radiograph. Treated with ERCP and stent  10/17/17 with no obstructive sx present at this time. History:  Past Medical History:   Diagnosis Date    Allergic rhinitis     Arthritis of left knee     Elevated alkaline phosphatase level     Essential hypertension, benign     Gallstone     Gout     Hearing loss     Musculoskeletal disorder       Past Surgical History:   Procedure Laterality Date    HX CHOLECYSTECTOMY  10/13/2017    laparoscopic cholecystectomy with intraoperative cholangiogram    HX HEENT  05/27/2014    cataract    HX HEENT  05/13/2014    cataract    HX ORTHOPAEDIC      left knee cartilage repair    HX OTHER SURGICAL      surgery on lungs as baby    IN ERCP STENT PLACEMENT BILIARY/PANCREATIC DUCT  10/18/2017         IN ERCP W/SPHINCTEROTOMY/PAPILLOTOMY  10/18/2017           Social History     Social History    Marital status:      Spouse name: N/A    Number of children: N/A    Years of education: N/A     Social History Main Topics    Smoking status: Never Smoker    Smokeless tobacco: Never Used    Alcohol use No    Drug use: No    Sexual activity: Not Asked     Other Topics Concern    None     Social History Narrative    Lives in [place] with wife. No children. 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 Raised horses and dogs and played golf. Likes to [employer]       Family History   Problem Relation Age of Onset   [address] [facility] Cancer Mother     Alzheimer Father       Patient Active Problem List   Diagnosis Code    Gallstones K80.20    Elevated alkaline phosphatase level R74.8    Hyperparathyroidism ([address] E21.3    Hypocalcemia E83.51    Acute pancreatitis K85.90       Allergies: No Known Allergies  Medications:   Prior to Admission medications    Medication Sig Start Date End Date Taking? Authorizing Provider   albuterol (PROVENTIL VENTOLIN) 2.5 mg /3 mL (0.083 %) nebulizer solution by Nebulization route once. Yes Historical Provider   benzonatate (TESSALON) 100 mg capsule Take 100 mg by mouth three (3) times daily as needed for Cough. Yes Historical Provider   HYDROcodone-acetaminophen (NORCO) 5-325 mg per tablet Take  by mouth. Yes Historical Provider   ondansetron (ZOFRAN ODT) 4 mg disintegrating tablet Take 4 mg by mouth every eight (8) hours as needed for Nausea. Yes Historical Provider   GUAIFENESIN/DEXTROMETHORPHAN (Q-TUSSIN DM PO) Take 10 mL by mouth. Q 4 hrs PRN for cough   Yes Historical Provider   albuterol (VENTOLIN HFA) 90 mcg/actuation inhaler Take  by inhalation. Yes Historical Provider   amLODIPine (NORVASC) 10 mg tablet Take  by mouth daily. Yes Historical Provider   docusate sodium (COLACE) 100 mg capsule Take 1 Cap by mouth two (2) times daily as needed for Constipation. 10/19/17  Yes Gaurav Godwin MD   montelukast (SINGULAIR) 10 mg tablet Take 10 mg by mouth daily. Yes Historical Provider   lisinopril (PRINIVIL, ZESTRIL) 20 mg tablet Take 20 mg by mouth daily. Yes Historical Provider   cholecalciferol (VITAMIN D3) 1,000 unit cap Take 1,000 Units by mouth daily. Yes Historical Provider   ibuprofen (MOTRIN) 600 mg tablet Take 600 mg by mouth two (2) times daily as needed for Pain (Knee pain). Yes Historical Provider   fluticasone (FLONASE) 50 mcg/actuation nasal spray 1 [place] by Both Nostrils route daily as needed for Rhinitis or Allergies. Yes Historical Provider     Physical Exam:   Vital Signs: Blood pressure 192/71, pulse (!) 53, temperature 97.7 °F (36.5 °C), resp. rate 18, height 5' 3\" (1.6 m), weight 71.7 kg (158 lb 1.1 oz), SpO2 98 %.   General: well developed, well nourished   HEENT: unremarkable   Heart: regular rhythm no mumur    Lungs: clear   Abdominal:  benign   Neurological: unremarkable   Extremities: no edema     Findings/Diagnosis: distal biliary stricture, abnl biliary radiograph    Plan of Care/Planned Procedure: ERCP with GA    Signed:  Montserrat Pollock MD 1/9/2018

## 2018-01-09 NOTE — PROCEDURES
NAME:  Bobbi Patterson   :   1935   MRN:   [id number]     [facility]  Date/Time:  2018 10:59 AM    Procedure Type:   ERCP with biliary stent removal     Indications: biliary stricture  Pre-operative Diagnosis: see indication above  Post-operative Diagnosis:  See findings below  : Jennifer Aguillon MD  Referring Provider:    Larisa Oreilly MD    Exam:  Airway: clear, no airway problems anticipated  Heart: RRR, without gallops or rubs  Lungs: clear bilaterally without wheezes, crackles, or rhonchi  Abdomen: soft, nontender, nondistended, bowel sounds present  Mental Status: awake, alert and oriented to person, place and time    Sedation:  General anesthesia  Procedure Details:  After informed consent was obtained with all risks and benefits of procedure explained, the patient was taken to the fluoroscopy suite and placed in the prone position. Upon sequential sedation as per above, the Olympus duodenoscope [id number]   was inserted via the mouthpeice and carefully advanced to the second portion of the duodenum. The quality of visualization was adequate. The duodenoscope was withdrawn into a short position. Findings:   Endoscopic: -normal esophagus, stomach, and duodenum  Ampulla:Evidence of prior sphincterotomy with protruding metal biliary stent   Cholangiogram: -Mild biliary ductal dilatation to 10mm with smooth tapering of distal 2cm CBD segment toward  level of the ampulla, without obvious associated stone or filling defect  Pancreatogram:not performed     Specimen Removed:  None  Complications: None. EBL:  None.     Interventions:    Pancreatic: none  Biliary: After removal of previously placed metal biliary stent using snare device, we demonstrated evidence of prior sphincterotomy with ready bile efflux. Prompt wire-guided cannulation of CBD achieved through 9-12mm biliary balloon with position in CBD confirmed fluoroscopically.   Contrast injection shows mild biliary ductal dilatation to 10mm with smooth tapering of distal 2cm CBD segment toward  level of the ampulla, without obvious associated stone or filling defect, with appearance most supportive of benign papillary stenosis. The  12mm balloon is advanced to the bifurcation of the CBD and is repeatedly easily pulled through this region of papillary stenosis and sphincterotomy. No stone or sludge is ejected. Good bile and contrast efflux is noted. The patient is noted to tolerate the procedure well.     Impression:    -Normal esophagus, stomach, and duodenum  -Previously placed metal biliary stent removed. 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 12mm balloon is repeatedly easily pulled through this region of papillary stenosis and sphincterotomy. No stone or sludge is ejected. Good bile and contrast efflux is noted. Recommendations:    -Keep NPO x 2hrs, then resume prior diet.    -Resume normal medication(s).         Discharge Disposition:  Home following recovery in [place]

## 2018-01-09 NOTE — IP AVS SNAPSHOT
[address] [facility] 
[phone] Patient: Carlos Boston MRN: [id number] NZD:1/17/0488 A check christiana indicates which time of day the medication should be taken. My Medications CONTINUE taking these medications Instructions Each Dose to Equal  
 Morning Noon Evening Bedtime * albuterol 2.5 mg /3 mL (0.083 %) nebulizer solution Commonly known as:  PROVENTIL VENTOLIN Your last dose was: Your next dose is:    
   
   
 by Nebulization route once. * VENTOLIN HFA 90 mcg/actuation inhaler Generic drug:  albuterol Your last dose was: Your next dose is: Take  by inhalation. amLODIPine 10 mg tablet Commonly known as:  [place] Sandhoff Your last dose was: Your next dose is: Take  by mouth daily. benzonatate 100 mg capsule Commonly known as:  TESSALON Your last dose was: Your next dose is: Take 100 mg by mouth three (3) times daily as needed for Cough. 100 mg  
    
   
   
   
  
 docusate sodium 100 mg capsule Commonly known as:  Marilin Melara Your last dose was: Your next dose is: Take 1 Cap by mouth two (2) times daily as needed for Constipation. 100 mg FLONASE 50 mcg/actuation nasal spray Generic drug:  fluticasone Your last dose was: Your next dose is:    
   
   
 1 Spray by Both Nostrils route daily as needed for Rhinitis or Allergies. 1 Spray HYDROcodone-acetaminophen 5-325 mg per tablet Commonly known as:  Mine Mcgraw Your last dose was: Your next dose is: Take  by mouth. ibuprofen 600 mg tablet Commonly known as:  MOTRIN Your last dose was: Your next dose is: Take 600 mg by mouth two (2) times daily as needed for Pain (Knee pain). 600 mg  
    
   
   
   
  
 lisinopril 20 mg tablet Commonly known as:  Molina Tavon Your last dose was: Your next dose is: Take 20 mg by mouth daily. 20 mg  
    
   
   
   
  
 montelukast 10 mg tablet Commonly known as:  SINGULAIR Your last dose was: Your next dose is: Take 10 mg by mouth daily. 10 mg  
    
   
   
   
  
 ondansetron 4 mg disintegrating tablet Commonly known as:  ZOFRAN ODT Your last dose was: Your next dose is: Take 4 mg by mouth every eight (8) hours as needed for Nausea. 4 mg  
    
   
   
   
  
 Q-TUSSIN DM PO Your last dose was: Your next dose is: Take 10 mL by mouth. Q 4 hrs PRN for cough 10 mL  
    
   
   
   
  
 VITAMIN D3 1,000 unit Cap Generic drug:  cholecalciferol Your last dose was: Your next dose is: Take 1,000 Units by mouth daily. 1000 Units * Notice: This list has 2 medication(s) that are the same as other medications prescribed for you. Read the directions carefully, and ask your doctor or other care provider to review them with you.

## 2018-01-09 NOTE — PERIOP NOTES
Handoff Report from Operating Room to PACU    Report received from Brody Parker RN and Evelia Bonner regarding Hannah Puls. Surgeon(s):  Lindsay Alejandre MD  And Procedure(s) (LRB):  ENDOSCOPIC RETROGRADE CHOLANGIOPANCREATOGRAPHY STENT REMOVAL (N/A)  confirmed   with allergies discussed. Anesthesia type, drugs, patient history, complications, estimated blood loss, vital signs, intake and output, and last pain medication, lines and temperature were reviewed.

## 2018-01-09 NOTE — IP AVS SNAPSHOT
[address] [facility] 
[phone] Patient: Alexus Gonzales MRN: [id number] KFW:3/51/4050 About your hospitalization You were admitted on:  January 9, 2018 You last received care in the:  [facility] PACU You were discharged on:  January 9, 2018 Why you were hospitalized Your primary diagnosis was:  Not on File Follow-up Information Follow up With Details Comments Contact Info Taiwo Borden MD   [facility] 
[facility] [id number] 
[phone] Discharge Orders None A check christiana indicates which time of day the medication should be taken. My Medications CONTINUE taking these medications Instructions Each Dose to Equal  
 Morning Noon Evening Bedtime * albuterol 2.5 mg /3 mL (0.083 %) nebulizer solution Commonly known as:  PROVENTIL VENTOLIN Your last dose was: Your next dose is:    
   
   
 by Nebulization route once. * VENTOLIN HFA 90 mcg/actuation inhaler Generic drug:  albuterol Your last dose was: Your next dose is: Take  by inhalation. amLODIPine 10 mg tablet Commonly known as:  Jong Lames Your last dose was: Your next dose is: Take  by mouth daily. benzonatate 100 mg capsule Commonly known as:  TESSALON Your last dose was: Your next dose is: Take 100 mg by mouth three (3) times daily as needed for Cough. 100 mg  
    
   
   
   
  
 docusate sodium 100 mg capsule Commonly known as:  Rolando Peeling Your last dose was: Your next dose is: Take 1 Cap by mouth two (2) times daily as needed for Constipation. 100 mg FLONASE 50 mcg/actuation nasal spray Generic drug:  fluticasone Your last dose was: Your next dose is: 1 [place] by Both Nostrils route daily as needed for Rhinitis or Allergies. 1 Spray HYDROcodone-acetaminophen 5-325 mg per tablet Commonly known as:  Eliceo Rudd Your last dose was: Your next dose is: Take  by mouth. ibuprofen 600 mg tablet Commonly known as:  MOTRIN Your last dose was: Your next dose is: Take 600 mg by mouth two (2) times daily as needed for Pain (Knee pain). 600 mg  
    
   
   
   
  
 lisinopril 20 mg tablet Commonly known as:  [place] Kalata Your last dose was: Your next dose is: Take 20 mg by mouth daily. 20 mg  
    
   
   
   
  
 montelukast 10 mg tablet Commonly known as:  SINGULAIR Your last dose was: Your next dose is: Take 10 mg by mouth daily. 10 mg  
    
   
   
   
  
 ondansetron 4 mg disintegrating tablet Commonly known as:  ZOFRAN ODT Your last dose was: Your next dose is: Take 4 mg by mouth every eight (8) hours as needed for Nausea. 4 mg  
    
   
   
   
  
 Q-TUSSIN DM PO Your last dose was: Your next dose is: Take 10 mL by mouth. Q 4 hrs PRN for cough 10 mL  
    
   
   
   
  
 VITAMIN D3 1,000 unit Cap Generic drug:  cholecalciferol Your last dose was: Your next dose is: Take 1,000 Units by mouth daily. 1000 Units * Notice: This list has 2 medication(s) that are the same as other medications prescribed for you. Read the directions carefully, and ask your doctor or other care provider to review them with you. Discharge Instructions Margoth Pope [id number] 
1935 ERCP DISCHARGE INSTRUCTIONS Discomfort: 
Sore throat- throat lozenges or warm salt water gargle 
redness at IV site- apply warm compress to area; if redness or soreness persist- contact your physician Gaseous discomfort- walking, belching will help relieve any discomfort You may not operate a vehicle for 12 hours You may not engage in an occupation involving machinery or appliances for rest of today You may not drink alcoholic beverages for at least 12 hours Avoid making any critical decisions for at least 24 hour DIET You may have minimal sips at this time-- do not eat or drink for two hours. You may eat and drink after 1pm today You may resume your regular diet  however -  remember your colon is empty and a heavy meal will produce gas. Avoid these foods:  vegetables, fried / greasy foods, carbonated drinks MEDICATIONS: 
 
 
 
ACTIVITY You may resume your normal daily activities until tomorrow AM; 
Spend the remainder of the day resting -  avoid any strenuous activity. CALL M.D. ANY SIGN OF Increasing pain, nausea, vomiting Abdominal distension (swelling) New increased bleeding (oral or rectal) Fever (chills) Pain in chest area Bloody discharge from nose or mouth Shortness of breath IMPRESSION: 
-Normal esophagus, stomach, and duodenum 
-Previously placed metal biliary stent removed. 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 12mm balloon is repeatedly easily pulled through this region of papillary stenosis and sphincterotomy. No stone or sludge is ejected. Good bile and contrast efflux is noted. Follow-up Instructions: 
 Call Dr. Kimberlyn Lane if questions arise regarding your procedure Telephone # [phone] No follow-up office visit required Sandra Healy MD 
 
  
DISCHARGE SUMMARY from Nurse PATIENT INSTRUCTIONS: 
 
After general anesthesia or intravenous sedation, for 24 hours or while taking prescription Narcotics: · Limit your activities · Do not drive and operate hazardous machinery · Do not make important personal or business decisions · Do  not drink alcoholic beverages · If you have not urinated within 8 hours after discharge, please contact your surgeon on call. Report the following to your surgeon: 
· Excessive pain, swelling, redness or odor of or around the surgical area · Temperature over 100.5 · Nausea and vomiting lasting longer than 4 hours or if unable to take medications · Any signs of decreased circulation or nerve impairment to extremity: change in color, persistent  numbness, tingling, coldness or increase pain · Any questions What to do at Home: *  Please give a list of your current medications to your Primary Care Provider. *  Please update this list whenever your medications are discontinued, doses are 
    changed, or new medications (including over-the-counter products) are added. *  Please carry medication information at all times in case of emergency situations. These are general instructions for a healthy lifestyle: No smoking/ No tobacco products/ Avoid exposure to second hand smoke Surgeon General's Warning:  Quitting smoking now greatly reduces serious risk to your health. Obesity, smoking, and sedentary lifestyle greatly increases your risk for illness A healthy diet, regular physical exercise & weight monitoring are important for maintaining a healthy lifestyle You may be retaining fluid if you have a history of heart failure or if you experience any of the following symptoms:  Weight gain of 3 pounds or more overnight or 5 pounds in a week, increased swelling in our hands or feet or shortness of breath while lying flat in bed. Please call your doctor as soon as you notice any of these symptoms; do not wait until your next office visit. Recognize signs and symptoms of STROKE: 
 
 
? soup 
? broth 
?  toast  
? crackers ? applesauce 
? bananas  
? mashed potatoes, 
? soft or scrambled eggs 
? oatmeal 
?  jello It is important to eat when taking your pain medication. This will help to prevent nausea. If possible, please try to time your meals with your medications. It is very important to stay hydrated following surgery. Sip fluids frequently while awake. Avoid acidic drinks such as citrus juices and soda for 24 hours. Carbonated beverages may cause bloating and gas. Acceptable fluids include: 
 
? water (flavor packets may add variety) ? coffee or tea (in moderation) ? Gatorade ? Andra Figures ? apple juice 
? cranberry juice You are encouraged to cough and deep breathe every hour when awake. This will help to prevent respiratory complications following anesthesia. You may want to hug a pillow when coughing and sneezing to add additional support to the surgical area and to decrease discomfort if you had abdominal or chest surgery. If you are discharged home with support stockings, you may remove them after 24 hours. Support stockings are used to help prevent blood clots in the legs following surgery. Please take time to review all of your Home Care Instructions and Medication Information sheets provided in your discharge packet. If you have any questions, please contact your surgeons office. Thank you. Introducing [facility]! [facility] introduces [employer] ([employer]) patient portal. Now you can access parts of your medical record, email your doctor's office, and request medication refills online. 1. In your internet browser, go to [email or web address] 2. Click on the First Time User? Click Here link in the Sign In box. You will see the New Member Sign Up page. 3. Enter your [employer] ([employer]) Access Code exactly as it appears below. You will not need to use this code after youve completed the sign-up process. If you do not sign up before the expiration date, you must request a new code. · [employer] ([employer]) Access Code: [email or web address] Expires: 1/11/2018  4:59 AM 
 
 4. Enter the last four digits of your Social Security Number (xxxx) and Date of Birth (mm/dd/yyyy) as indicated and click Submit. You will be taken to the next sign-up page. 5. Create a [employer] ID. This will be your [employer] login ID and cannot be changed, so think of one that is secure and easy to remember. 6. Create a [employer] password. You can change your password at any time. 7. Enter your Password Reset Question and Answer. This can be used at a later time if you forget your password. 8. Enter your e-mail address. You will receive e-mail notification when new information is available in [address] 9. Click Sign Up. You can now view and download portions of your medical record. 10. Click the Download Summary menu link to download a portable copy of your medical information. If you have questions, please visit the Frequently Asked Questions section of the [employer] website. Remember, [employer] is NOT to be used for urgent needs. For medical emergencies, dial 911. Now available from your iPhone and Android! Providers Seen During Your Hospitalization Provider Specialty Primary office phone Devorah Disla MD Gastroenterology [phone] Your Primary Care Physician (PCP) Primary Care Physician Office Phone Office Fax Fabiana Rosenberg [phone] You are allergic to the following No active allergies Recent Documentation Height Weight BMI Smoking Status 1.6 m 71.7 kg 28 kg/m2 Never Smoker Emergency Contacts Name Discharge Info Relation Home Work Mobile Vida Palm DISCHARGE CAREGIVER [3] Spouse [3] [phone] Patient Belongings The following personal items are in your possession at time of discharge: 
  Dental Appliances: None  Visual Aid: None   Hearing Aids/Status:  At home  Home Medications: None   Jewelry: Watch  Clothing: Jacket/Coat, Pants, Undergarments, Footwear, Shirt, [employer], Socks    Other Valuables: Wallet  Personal Items Sent to Safe: wallet, watch sent to security Please provide this summary of care documentation to your next provider. Signatures-by signing, you are acknowledging that this After Visit Summary has been reviewed with you and you have received a copy. Patient Signature:  ____________________________________________________________ Date:  ____________________________________________________________  
  
[place] Current Provider Signature:  ____________________________________________________________ Date:  ____________________________________________________________

## 2018-01-09 NOTE — ANESTHESIA PREPROCEDURE EVALUATION
Anesthetic History   No history of anesthetic complications            Review of Systems / Medical History  Patient summary reviewed, nursing notes reviewed and pertinent labs reviewed    Pulmonary  Within defined limits                 Neuro/Psych   Within defined limits           Cardiovascular    Hypertension              Exercise tolerance: >4 METS  Comments: Hx bradycardia     GI/Hepatic/Renal               Comments: Retained Gallstone  Hx pancreatitis Endo/Other        Arthritis    Comments: Hx Hyperparathyroidism   Other Findings   Comments: Biliary stricture    Hearing loss   Gout   Allergic rhinitis  Musculoskeletal disorder          Physical Exam    Airway  Mallampati: II  TM Distance: 4 - 6 cm  Neck ROM: decreased range of motion   Mouth opening: Normal     Cardiovascular  Regular rate and rhythm,  S1 and S2 normal,  no murmur, click, rub, or gallop             Dental      Comments: Multiple missing teeth.  Chipped R upper incisor   Pulmonary  Breath sounds clear to auscultation               Abdominal  GI exam deferred       Other Findings            Anesthetic Plan    ASA: 2  Anesthesia type: general    Monitoring Plan: BIS      Induction: Intravenous  Anesthetic plan and risks discussed with: Patient

## 2018-01-09 NOTE — DISCHARGE INSTRUCTIONS
Naveen Lynch  [id number]  1935    ERCP DISCHARGE INSTRUCTIONS  Discomfort:  Sore throat- throat lozenges or warm salt water gargle  redness at IV site- apply warm compress to area; if redness or soreness persist- contact your physician  Gaseous discomfort- walking, belching will help relieve any discomfort  You may not operate a vehicle for 12 hours  You may not engage in an occupation involving machinery or appliances for rest of today  You may not drink alcoholic beverages for at least 12 hours  Avoid making any critical decisions for at least 24 hour  DIET  You may have minimal sips at this time-- do not eat or drink for two hours. You may eat and drink after 1pm today  You may resume your regular diet - however -  remember your colon is empty and a heavy meal will produce gas. Avoid these foods:  vegetables, fried / greasy foods, carbonated drinks    MEDICATIONS:        ACTIVITY  You may resume your normal daily activities until tomorrow AM;  Spend the remainder of the day resting -  avoid any strenuous activity. CALL M.D. ANY SIGN OF   Increasing pain, nausea, vomiting  Abdominal distension (swelling)  New increased bleeding (oral or rectal)  Fever (chills)  Pain in chest area  Bloody discharge from nose or mouth  Shortness of breath    IMPRESSION:  -Normal esophagus, stomach, and duodenum  -Previously placed metal biliary stent removed. 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 12mm balloon is repeatedly easily pulled through this region of papillary stenosis and sphincterotomy. No stone or sludge is ejected. Good bile and contrast efflux is noted.     Follow-up Instructions:   Call Dr. Jamie Parks if questions arise regarding your procedure   Telephone # [phone]  No follow-up office visit required    [facility], MD       DISCHARGE SUMMARY from Nurse    PATIENT INSTRUCTIONS:    After general anesthesia or intravenous sedation, for 24 hours or while taking prescription Narcotics:  · Limit your activities  · Do not drive and operate hazardous machinery  · Do not make important personal or business decisions  · Do  not drink alcoholic beverages  · If you have not urinated within 8 hours after discharge, please contact your surgeon on call. Report the following to your surgeon:  · Excessive pain, swelling, redness or odor of or around the surgical area  · Temperature over 100.5  · Nausea and vomiting lasting longer than 4 hours or if unable to take medications  · Any signs of decreased circulation or nerve impairment to extremity: change in color, persistent  numbness, tingling, coldness or increase pain  · Any questions    What to do at Home:    *  Please give a list of your current medications to your Primary Care Provider. *  Please update this list whenever your medications are discontinued, doses are      changed, or new medications (including over-the-counter products) are added. *  Please carry medication information at all times in case of emergency situations. These are general instructions for a healthy lifestyle:    No smoking/ No tobacco products/ Avoid exposure to second hand smoke  Surgeon General's Warning:  Quitting smoking now greatly reduces serious risk to your health. Obesity, smoking, and sedentary lifestyle greatly increases your risk for illness    A healthy diet, regular physical exercise & weight monitoring are important for maintaining a healthy lifestyle    You may be retaining fluid if you have a history of heart failure or if you experience any of the following symptoms:  Weight gain of 3 pounds or more overnight or 5 pounds in a week, increased swelling in our hands or feet or shortness of breath while lying flat in bed.   Please call your doctor as soon as you notice any of these symptoms; do not wait until your next office visit. Recognize signs and symptoms of STROKE:    F-face looks uneven    A-arms unable to move or move unevenly    S-speech slurred or non-existent    T-time-call 911 as soon as signs and symptoms begin-DO NOT go       Back to bed or wait to see if you get better-TIME IS BRAIN. Warning Signs of HEART ATTACK     Call 911 if you have these symptoms:   Chest discomfort. Most heart attacks involve discomfort in the center of the chest that lasts more than a few minutes, or that goes away and comes back. It can feel like uncomfortable pressure, squeezing, fullness, or pain.  Discomfort in other areas of the upper body. Symptoms can include pain or discomfort in one or both arms, the back, neck, jaw, or stomach.  Shortness of breath with or without chest discomfort.  Other signs may include breaking out in a cold sweat, nausea, or lightheadedness. Don't wait more than five minutes to call 911 - MINUTES MATTER! Fast action can save your life. Calling 911 is almost always the fastest way to get lifesaving treatment. Emergency Medical Services staff can begin treatment when they arrive -- up to an hour sooner than if someone gets to the hospital by car. The discharge information has been reviewed with the patient and caregiver. The patient and caregiver verbalized understanding. Discharge medications reviewed with the patient and caregiver and appropriate educational materials and side effects teaching were provided. ___________________________________________________________________________________________________________________________________    A common side effect of anesthesia following surgery is nausea and/or vomiting. In order to decrease symptoms, it is wise to avoid foods that are high in fat, greasy foods, milk products, and spicy foods for the first 24 hours.     Acceptable foods for the first 24 hours following surgery include but are not limited to:     soup   broth    toast    crackers    applesauce    bananas    mashed potatoes,   soft or scrambled eggs   oatmeal    jello    It is important to eat when taking your pain medication. This will help to prevent nausea. If possible, please try to time your meals with your medications. It is very important to stay hydrated following surgery. Sip fluids frequently while awake. Avoid acidic drinks such as citrus juices and soda for 24 hours. Carbonated beverages may cause bloating and gas. Acceptable fluids include:    - water (flavor packets may add variety)  - coffee or tea (in moderation)  - Gatorade  - Bethel-aid  - apple juice  - cranberry juice    You are encouraged to cough and deep breathe every hour when awake. This will help to prevent respiratory complications following anesthesia. You may want to hug a pillow when coughing and sneezing to add additional support to the surgical area and to decrease discomfort if you had abdominal or chest surgery. If you are discharged home with support stockings, you may remove them after 24 hours. Support stockings are used to help prevent blood clots in the legs following surgery. Please take time to review all of your Home Care Instructions and Medication Information sheets provided in your discharge packet. If you have any questions, please contact your surgeons office. Thank you.

## 2018-10-16 NOTE — PROGRESS NOTES
Cardiology Hospitalist Progress Note    NAME: Keely Teran   :  1935   MRN:  [id number]       Assessment / Plan:  Gallstone pancreatitis, POA lipase >3000  Elevated LFTs and bilirubin, POA, secondary to above  S/p laparoscopic cholecystectomy with IOC and CBD exploration 10/16  -MRI \"Cholelithiasis. Common duct is normal caliber. No stones are identified  within the duct. There is a small amount of nonspecific fluid adjacent to the  duodenum/gallbladder neck\"  -CBD stone on IOC  -IV antiemetic, IV pain med. -lipase up to 2976 following surgery 10/16 now trending down. Tbili up today. -GI and surgery following, recs appreciated. -NPO, on IVF, going for ERCP today. HTN  -Continue holding BP meds, IV hydralazine prn    vitamin D deficiency  -Continue Vitamin D    Hypophosphatemia, resolved    Hypokalemia  -replete    Body mass index is 29.23 kg/(m^2).     Code status: Full  Prophylaxis: SCD's  Recommended Disposition: Home w/Family     Subjective:     Chief Complaint / Reason for Physician Visit  Tolerating PO. Abd pain last night resolved. No BM + flatus. Discussed with RN events overnight. Review of Systems:  Symptom Y/N Comments  Symptom Y/N Comments   Fever/Chills n   Chest Pain n    Poor Appetite    Edema     Cough n   Abdominal Pain y    Sputum    Joint Pain     SOB/ADRIAN n   Pruritis/Rash     Nausea/vomit n   Tolerating PT/OT     Diarrhea    Tolerating Diet y    Constipation    Other       Could NOT obtain due to:      Objective:     VITALS:   Last 24hrs VS reviewed since prior progress note.  Most recent are:  Patient Vitals for the past 24 hrs:   Temp Pulse Resp BP SpO2   10/18/17 1348 - (!) 59 18 175/67 94 %   10/18/17 1146 - 67 18 183/66 97 %   10/18/17 0809 - 60 17 (!) 202/85 94 %   10/18/17 0400 98.3 °F (36.8 °C) (!) 57 18 187/82 91 %   10/17/17 2340 98.4 °F (36.9 °C) 60 16 184/83 92 %   10/17/17 1900 98.3 °F (36.8 °C) 60 16 192/88 93 %   10/17/17 1600 97.6 °F (36.4 °C) 63 16 185/85 92 % Intake/Output Summary (Last 24 hours) at 10/18/17 1454  Last data filed at 10/18/17 0000   Gross per 24 hour   Intake           508.33 ml   Output                0 ml   Net           508.33 ml        PHYSICAL EXAM:  General:                    WD, WN. Alert, cooperative, no acute distress    EENT:                       EOMI. Anicteric sclerae. MMM  Resp:                        CTA bilaterally, no wheezing or rales.  No accessory muscle use  CV:                            Regular  rhythm,  No edema  GI:                              Soft, non distended,nontender, BS+  Neurologic:                Alert and oriented X 3, normal speech,  No focal deficits  Psych:                       Good insight. Not anxious nor agitated  Skin:                           No rashes.  No jaundice    Reviewed most current lab test results and cultures  YES  Reviewed most current radiology test results   YES  Review and summation of old records today    NO  Reviewed patient's current orders and MAR    YES  PMH/SH reviewed - no change compared to H&P  ________________________________________________________________________  Care Plan discussed with:    Comments   Patient y    Family      RN y    Care Manager     Consultant                        Multidiciplinary team rounds were held today with , nursing, pharmacist and clinical coordinator. Patient's plan of care was discussed; medications were reviewed and discharge planning was addressed.      ________________________________________________________________________  Total NON critical care TIME:  20   Minutes    Total CRITICAL CARE TIME Spent:   Minutes non procedure based      Comments   >50% of visit spent in counseling and coordination of care     ________________________________________________________________________  Lydia Bustillos MD     Procedures: see electronic medical records for all procedures/Xrays and details which were not copied into this note but were reviewed prior to creation of Plan. LABS:  I reviewed today's most current labs and imaging studies.   Pertinent labs include:  Recent Labs      10/18/17   0351  10/17/17   0600  10/16/17   0329   WBC  6.3  6.4  5.2   HGB  13.0  12.6  12.8   HCT  38.6  37.7  36.8   PLT  139*  156  146*     Recent Labs      10/18/17   0351  10/17/17   0600  10/16/17   0329   NA  139  142  140   K  3.4*  3.7  3.3*   CL  106  107  106   CO2  28  26  26   GLU  93  106*  104*   BUN  10  11  13   CREA  1.05  1.05  0.99   CA  8.0*  8.4*  8.7   MG   --   1.9  1.9   PHOS   --   3.6  2.7   ALB  2.6*  2.6*  2.9*   TBILI  3.7*  2.9*  1.1*   SGOT  101*  128*  56*   ALT  125*  157*  148*       Signed: Cedric Brand MD

## 2020-11-25 ENCOUNTER — OFFICE VISIT (OUTPATIENT)
Dept: SURGERY | Age: 85
End: 2020-11-25
Payer: MEDICARE

## 2020-11-25 VITALS
BODY MASS INDEX: 26.58 KG/M2 | HEIGHT: 63 IN | RESPIRATION RATE: 17 BRPM | DIASTOLIC BLOOD PRESSURE: 62 MMHG | HEART RATE: 58 BPM | WEIGHT: 150 LBS | TEMPERATURE: 97.5 F | SYSTOLIC BLOOD PRESSURE: 135 MMHG | OXYGEN SATURATION: 97 %

## 2020-11-25 DIAGNOSIS — R10.33 PERIUMBILICAL ABDOMINAL PAIN: Primary | ICD-10-CM

## 2020-11-25 DIAGNOSIS — R10.33 PERIUMBILICAL ABDOMINAL PAIN: ICD-10-CM

## 2020-11-25 DIAGNOSIS — M54.50 LOW BACK PAIN, UNSPECIFIED BACK PAIN LATERALITY, UNSPECIFIED CHRONICITY, UNSPECIFIED WHETHER SCIATICA PRESENT: ICD-10-CM

## 2020-11-25 PROCEDURE — G8427 DOCREV CUR MEDS BY ELIG CLIN: HCPCS | Performed by: SURGERY

## 2020-11-25 PROCEDURE — G8432 DEP SCR NOT DOC, RNG: HCPCS | Performed by: SURGERY

## 2020-11-25 PROCEDURE — 99204 OFFICE O/P NEW MOD 45 MIN: CPT | Performed by: SURGERY

## 2020-11-25 PROCEDURE — 1101F PT FALLS ASSESS-DOCD LE1/YR: CPT | Performed by: SURGERY

## 2020-11-25 PROCEDURE — G8419 CALC BMI OUT NRM PARAM NOF/U: HCPCS | Performed by: SURGERY

## 2020-11-25 PROCEDURE — G8536 NO DOC ELDER MAL SCRN: HCPCS | Performed by: SURGERY

## 2020-11-25 RX ORDER — COLCHICINE 0.6 MG/1
TABLET, FILM COATED ORAL
COMMUNITY
Start: 2020-11-19

## 2020-11-25 NOTE — PROGRESS NOTES
HISTORY OF PRESENT ILLNESS  Chiquita Stauffer is a 80 y.o. male. I performed a lap samir in 2017, > 3 years ago. POSTOPERATIVE DIAGNOSES   1. Gallstone pancreatitis. 2. Chronic cholecystitis. 3. Choledocholithiasis. He was admitted for 2 days to allow his pancreatitis to settle down, and then Dr. Ca Brewer performed an ERCP with stent placement    He had his stent removed 1/2018, and at the time Dr. Ca Brewer noted:  Mild biliary ductal dilatation to 10mm with smooth tapering of distal 2cm CBD segment toward  level of the ampulla, without obvious associated stone or filling defect    He had another ultrasound last week. I do not have access to the images. Report suggest possible stone in gallbladder bed and \"common bile duct stones\". Common bile duct is measured at only 4.9 mm. Ultrasound was performed due to abdominal pain. Currently c/o pain mostly lower back  Worse when he gets up from the toilet, stiff  Eases up when he walks. ____________________________________________________________________________  Patient presents with:  Abdominal Pain: Seen at the request of Dr. Shaila Orosco for evaluation of gallstones    /62 (BP 1 Location: Left arm, BP Patient Position: Sitting)   Pulse (!) 58   Temp 97.5 °F (36.4 °C) (Temporal)   Resp 17   Ht 5' 3\" (1.6 m)   Wt 68 kg (150 lb)   SpO2 97%   BMI 26.57 kg/m²   Past Medical History:  No date:  Allergic rhinitis  No date: Arthritis of left knee  No date: Elevated alkaline phosphatase level  No date: Essential hypertension, benign  No date: Gallstone  No date: Gout  No date: Hearing loss  No date: Musculoskeletal disorder  Past Surgical History:  1/9/2018: ERCP REMOVE FOREIGN BODY/STENT BILIARY/PANC DUCT      Comment:     10/13/2017: HX CHOLECYSTECTOMY      Comment:  laparoscopic cholecystectomy with intraoperative                cholangiogram  05/27/2014: [facility] HEENT      Comment:  cataract  05/13/2014: HX HEENT      Comment:  cataract  No date: HX ORTHOPAEDIC      Comment:  left knee cartilage repair  No date: HX OTHER SURGICAL      Comment:  surgery on lungs as baby  10/18/2017: [facility] ERCP STENT PLACEMENT BILIARY/PANCREATIC DUCT      Comment:     10/18/2017: [place] ERCP W/SPHINCTEROTOMY/PAPILLOTOMY      Comment:     Social History    Socioeconomic History      Marital status:       Spouse name: Not on file      Number of children: Not on file      Years of education: Not on file      Highest education level: Not on file    Tobacco Use      Smoking status: Never Smoker      Smokeless tobacco: Never Used    Substance and Sexual Activity      Alcohol use: No      Drug use: No    Social History Narrative      Lives in [place] with wife. No children. 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 Raised horses and dogs and played golf. Likes to [employer] Review of patient's family history indicates:  Problem: Cancer      Relation: Mother          Age of Onset: (Not Specified)  Problem: Alzheimer      Relation: Father          Age of Onset: (Not Specified)    Current Outpatient Medications:  Colcrys 0.6 mg tablet,   albuterol (PROVENTIL VENTOLIN) 2.5 mg /3 mL (0.083 %) nebulizer solution, by Nebulization route once. ondansetron (ZOFRAN ODT) 4 mg disintegrating tablet, Take 4 mg by mouth every eight (8) hours as needed for Nausea. albuterol (VENTOLIN HFA) 90 mcg/actuation inhaler, Take  by inhalation. amLODIPine (NORVASC) 10 mg tablet, Take  by mouth daily. montelukast (SINGULAIR) 10 mg tablet, Take 10 mg by mouth daily. lisinopril (PRINIVIL, ZESTRIL) 20 mg tablet, Take 20 mg by mouth daily. cholecalciferol (VITAMIN D3) 1,000 unit cap, Take 1,000 Units by mouth daily. ibuprofen (MOTRIN) 600 mg tablet, Take 600 mg by mouth two (2) times daily as needed for Pain (Knee pain). fluticasone (FLONASE) 50 mcg/actuation nasal spray, 1 [place] by Both Nostrils route daily as needed for Rhinitis or Allergies.     No current facility-administered medications for this visit. Allergies: No Known Allergies  _____________________________________________________________________________              Abdominal Pain   The history is provided by the patient. This is a new problem. The current episode started more than 1 week ago. Associated symptoms include abdominal pain. Pertinent negatives include no chest pain, no headaches and no shortness of breath. Exacerbated by: sitting still. Relieved by: walking. The treatment provided no relief. Review of Systems   Constitutional: Negative for chills, fever and weight loss. HENT: Negative for ear pain. Eyes: Negative for pain. Respiratory: Negative for shortness of breath. Cardiovascular: Negative for chest pain. Gastrointestinal: Positive for abdominal pain. Negative for blood in stool. Genitourinary: Negative for hematuria. Musculoskeletal: Negative for joint pain. Skin: Negative for rash. Neurological: Negative for dizziness, focal weakness, seizures and headaches. Endo/Heme/Allergies: Does not bruise/bleed easily. Psychiatric/Behavioral: The patient does not have insomnia. Physical Exam  Constitutional:       General: He is not in acute distress. Appearance: He is well-developed. HENT:      Head: Normocephalic. Mouth/Throat:      Pharynx: No oropharyngeal exudate. Eyes:      General: No scleral icterus. Pupils: Pupils are equal, round, and reactive to light. Neck:      Musculoskeletal: Neck supple. Trachea: No tracheal deviation. Cardiovascular:      Rate and Rhythm: Normal rate and regular rhythm. Heart sounds: Normal heart sounds. Pulmonary:      Effort: Pulmonary effort is normal.      Breath sounds: Normal breath sounds. No wheezing. Abdominal:      General: There is no distension. Palpations: Abdomen is soft. There is no mass. Tenderness: There is no abdominal tenderness.       Hernia: No hernia is present. Musculoskeletal: Normal range of motion. Lymphadenopathy:      Cervical: No cervical adenopathy. Skin:     General: Skin is warm. Findings: No erythema or rash. Comments: Tender overlying the paraspinous muscles of the lower lumbar region. Neurological:      Mental Status: He is alert and oriented to person, place, and time. Psychiatric:         Behavior: Behavior normal.         ASSESSMENT and PLAN    ICD-10-CM ICD-9-CM    1. Periumbilical abdominal pain  R10.33 789.05 MRI ABD W MRCP WO CONT   2. Low back pain, unspecified back pain laterality, unspecified chronicity, unspecified whether sciatica present  M54.5 724.2       Reports having some mild abdominal pain. His primary complaint today sounds more musculoskeletal with paraspinous pain and tenderness while sitting in certain positions. With regard to the common bile duct stones noted on recent imaging,   Will try to get his recent labs. We will also evaluate with a MRCP. His common bile duct was clear in 2018. Is quite usual to develop, bile duct stones after cholecystectomy. Difficult to say if any of his vague abdominal complaints are related. Further management after the MRCP. If MRCP confirms stones we will ask Dr. Neptali Olivarez to consider repeat ERCP. Expressed understanding of our discussion and is agreeable with the plan. Thank you for this consult.

## 2020-11-25 NOTE — PROGRESS NOTES
dentified pt with two pt identifiers(name and ). Reviewed record in preparation for visit and have obtained necessary documentation. All patient medications has been reviewed. Chief Complaint   Patient presents with    Abdominal Pain     Seen at the request of Dr. Cecil Anderson for evaluation of gallstones       Health Maintenance Due   Topic    DTaP/Tdap/Td series (1 - Tdap)    Shingrix Vaccine Age 50> (1 of 2)    GLAUCOMA SCREENING Q2Y     Pneumococcal 65+ years (1 of 1 - PPSV23)    Medicare Yearly Exam     Flu Vaccine (1)       Vitals:    20 1030   BP: 135/62   Pulse: (!) 58   Resp: 17   Temp: 97.5 °F (36.4 °C)   TempSrc: Temporal   SpO2: 97%   Weight: 68 kg (150 lb)   Height: 5' 3\" (1.6 m)   PainSc:   0 - No pain       4. Have you been to the ER, urgent care clinic since your last visit? Hospitalized since your last visit? No    5. Have you seen or consulted any other health care providers outside of the [address] [place] [address] since your last visit? Include any pap smears or colon screening. No    6. Do you have an Advanced Directive/ Living Will in place? NO  If yes, do we have a copy on file NO  If no, would you like information NO    Patient is accompanied by self I have received verbal consent from Joel Dowling to discuss any/all medical information while they are present in the room.

## 2020-12-11 ENCOUNTER — TELEPHONE (OUTPATIENT)
Dept: SURGERY | Age: 85
End: 2020-12-11

## 2020-12-11 ENCOUNTER — HOSPITAL ENCOUNTER (OUTPATIENT)
Dept: MRI IMAGING | Age: 85
Discharge: HOME | End: 2020-12-11
Attending: SURGERY
Payer: MEDICARE

## 2020-12-11 PROCEDURE — 74181 MRI ABDOMEN W/O CONTRAST: CPT

## 2020-12-11 NOTE — TELEPHONE ENCOUNTER
Let him know the MRCP looks ok. There are no stones in the duct.    His back pain is likely musculoskeletal.

## 2020-12-17 ENCOUNTER — OFFICE VISIT (OUTPATIENT)
Dept: SURGERY | Age: 85
End: 2020-12-17
Payer: MEDICARE

## 2020-12-17 VITALS
RESPIRATION RATE: 16 BRPM | OXYGEN SATURATION: 98 % | HEIGHT: 63 IN | HEART RATE: 65 BPM | BODY MASS INDEX: 27.11 KG/M2 | SYSTOLIC BLOOD PRESSURE: 149 MMHG | DIASTOLIC BLOOD PRESSURE: 77 MMHG | WEIGHT: 153 LBS | TEMPERATURE: 97.3 F

## 2020-12-17 DIAGNOSIS — M54.50 LOW BACK PAIN, UNSPECIFIED BACK PAIN LATERALITY, UNSPECIFIED CHRONICITY, UNSPECIFIED WHETHER SCIATICA PRESENT: Primary | ICD-10-CM

## 2020-12-17 PROCEDURE — G8427 DOCREV CUR MEDS BY ELIG CLIN: HCPCS | Performed by: SURGERY

## 2020-12-17 PROCEDURE — 99213 OFFICE O/P EST LOW 20 MIN: CPT | Performed by: SURGERY

## 2020-12-17 PROCEDURE — G8419 CALC BMI OUT NRM PARAM NOF/U: HCPCS | Performed by: SURGERY

## 2020-12-17 PROCEDURE — G8432 DEP SCR NOT DOC, RNG: HCPCS | Performed by: SURGERY

## 2020-12-17 PROCEDURE — 1101F PT FALLS ASSESS-DOCD LE1/YR: CPT | Performed by: SURGERY

## 2020-12-17 PROCEDURE — G8536 NO DOC ELDER MAL SCRN: HCPCS | Performed by: SURGERY

## 2020-12-17 NOTE — PROGRESS NOTES
Identified pt with two pt identifiers(name and ). Reviewed record in preparation for visit and have obtained necessary documentation. All patient medications has been reviewed. Chief Complaint   Patient presents with    Follow-up     gallstones       Health Maintenance Due   Topic    DTaP/Tdap/Td series (1 - Tdap)    Shingrix Vaccine Age 49> (1 of 2)    GLAUCOMA SCREENING Q2Y     Pneumococcal 65+ years (1 of 1 - PPSV23)    Medicare Yearly Exam     Flu Vaccine (1)       Vitals:    20 1016   BP: (!) 149/77   Pulse: 65   Resp: 16   Temp: 97.3 °F (36.3 °C)   TempSrc: Temporal   SpO2: 98%   Weight: 69.4 kg (153 lb)   Height: 5' 3\" (1.6 m)   PainSc:   0 - No pain       4. Have you been to the ER, urgent care clinic since your last visit? Hospitalized since your last visit? No    5. Have you seen or consulted any other health care providers outside of the [address] [place] [address] since your last visit? Include any pap smears or colon screening. No      Patient is accompanied by self I have received verbal consent from Juwan Morel to discuss any/all medical information while they are present in the room.

## 2020-12-17 NOTE — Clinical Note
12/17/2020 Patient: Uzma Manriquez YOB: 1935 Date of Visit: 12/17/2020 Marija Ram MD 
[facility] [phone] [address] Fax: [phone] Dear Marija Ram MD, Thank you for referring Mr. Marlene Kemp to [address] for evaluation. My notes for this consultation are attached. If you have questions, please do not hesitate to call me. I look forward to following your patient along with you. Sincerely, Taty Salgado MD

## 2020-12-17 NOTE — PROGRESS NOTES
Chief Complaint   Patient presents with    Follow-up     gallstones       I performed a lap samir in 2017, > 3 years ago. POSTOPERATIVE DIAGNOSES   1. Gallstone pancreatitis. 2. Chronic cholecystitis. 3. Choledocholithiasis. He was admitted for 2 days post to allow his pancreatitis to settle down, and then Dr. Jordan Marie performed an ERCP with stent placement     He had his stent removed 1/2018, and at the time Dr. Jordan Marie noted:  Mild biliary ductal dilatation to 10mm with smooth tapering of distal 2cm CBD segment toward  level of the ampulla, without obvious associated stone or filling defect     He had another ultrasound last month. I did not have access to the images. Report suggest possible stone in gallbladder bed and \"common bile duct stones\". Common bile duct is measured at only 4.9 mm.     His pain sounded musculoskeletal and not related to his abdomen, so we got an MRI to rule out any abdominal pathology. We called the nursing home last week to let them know the MRI did not show any evidence of common bile duct stones. His biliary anatomy is normal.  He has a small duodenal diverticulum which may explain the possible reading on the ultrasound. Patient here today to discuss results further. His symptoms continue to be primarily his lower back. Worse when he sits in 1 position such as on the toilet. Feels better once he walks and loosens up. No abdominal complaints. Tolerating p.o. Bowel movements normal.    We reviewed the MRI again and provided him a copy. His bile duct was explored when Dr. Jordan Marie placed the stent and without any abdominal complaints I do not think any further intervention is necessary. Patient expressed understanding of our discussion and agreeable with the plan. Follow-up as needed. I had an extensive and thorough discussion with Patricia Newman regarding current diagnosis and treatment recommendations.   Total face-to-face time spent with him was 20 minutes with the majority spent with counseling and coordination of care.

## 2021-03-10 ENCOUNTER — HOSPITAL ENCOUNTER (EMERGENCY)
Age: 86
Discharge: HOME | End: 2021-03-10
Attending: EMERGENCY MEDICINE
Payer: MEDICARE

## 2021-03-10 VITALS
WEIGHT: 150 LBS | HEIGHT: 63 IN | OXYGEN SATURATION: 97 % | DIASTOLIC BLOOD PRESSURE: 70 MMHG | BODY MASS INDEX: 26.58 KG/M2 | SYSTOLIC BLOOD PRESSURE: 141 MMHG | HEART RATE: 81 BPM | RESPIRATION RATE: 16 BRPM | TEMPERATURE: 98.3 F

## 2021-03-10 DIAGNOSIS — M10.9 ACUTE GOUT OF RIGHT ELBOW, UNSPECIFIED CAUSE: ICD-10-CM

## 2021-03-10 DIAGNOSIS — M10.9 ACUTE GOUT OF LEFT ANKLE, UNSPECIFIED CAUSE: ICD-10-CM

## 2021-03-10 DIAGNOSIS — M10.9 ACUTE GOUT OF LEFT ELBOW, UNSPECIFIED CAUSE: Primary | ICD-10-CM

## 2021-03-10 PROCEDURE — 99282 EMERGENCY DEPT VISIT SF MDM: CPT

## 2021-03-10 RX ORDER — HYDROCODONE BITARTRATE AND ACETAMINOPHEN 5; 325 MG/1; MG/1
1 TABLET ORAL
Qty: 10 TAB | Refills: 0 | Status: SHIPPED | OUTPATIENT
Start: 2021-03-10 | End: 2021-03-10 | Stop reason: SDUPTHER

## 2021-03-10 RX ORDER — HYDROCODONE BITARTRATE AND ACETAMINOPHEN 5; 325 MG/1; MG/1
1 TABLET ORAL
Qty: 10 TAB | Refills: 0 | Status: SHIPPED | OUTPATIENT
Start: 2021-03-10 | End: 2021-03-13

## 2021-03-10 NOTE — ED PROVIDER NOTES
EMERGENCY DEPARTMENT HISTORY AND PHYSICAL EXAM      Date: 3/10/2021  Patient Name: Nikki Davenport    History of Presenting Illness     Chief Complaint   Patient presents with    Ankle Pain     pt stopped his gout medicine 2 weeks ago and has been experiencing pain in his left ankle and bilateral elbows    Elbow Pain       History Provided By: Patient and Caregiver    HPI: Nikki Davenport, 80 y.o. male presents to the ED with cc of pain from gout. The patient has a history of gout involving his elbows and his left ankle. States that he stopped taking his colchicine 2 weeks ago. He has had a flareup of pain today which is 10 out of 10. He received colchicine and Ultram this morning at his facility, and it brought his pain down to about 8 out of 10. He was sent here for further evaluation. He denies trauma, fever, cough, chest pain, shortness of breath. He denies numbness or tingling. There are no other complaints, changes, or physical findings at this time. PCP: Kyler Johnson MD    No current facility-administered medications on file prior to encounter. Current Outpatient Medications on File Prior to Encounter   Medication Sig Dispense Refill    Colcrys 0.6 mg tablet       albuterol (PROVENTIL VENTOLIN) 2.5 mg /3 mL (0.083 %) nebulizer solution by Nebulization route once.  ondansetron (ZOFRAN ODT) 4 mg disintegrating tablet Take 4 mg by mouth every eight (8) hours as needed for Nausea.  albuterol (VENTOLIN HFA) 90 mcg/actuation inhaler Take  by inhalation.  amLODIPine (NORVASC) 10 mg tablet Take  by mouth daily.  montelukast (SINGULAIR) 10 mg tablet Take 10 mg by mouth daily.  lisinopril (PRINIVIL, ZESTRIL) 20 mg tablet Take 20 mg by mouth daily.  cholecalciferol (VITAMIN D3) 1,000 unit cap Take 1,000 Units by mouth daily.  ibuprofen (MOTRIN) 600 mg tablet Take 600 mg by mouth two (2) times daily as needed for Pain (Knee pain).       fluticasone (FLONASE) 50 mcg/actuation nasal spray 1 [place] by Both Nostrils route daily as needed for Rhinitis or Allergies. Past History     Past Medical History:  Past Medical History:   Diagnosis Date    Allergic rhinitis     Arthritis of left knee     Elevated alkaline phosphatase level     Essential hypertension, benign     Gallstone     Gout     Hearing loss     Musculoskeletal disorder        Past Surgical History:  Past Surgical History:   Procedure Laterality Date    ERCP REMOVE FOREIGN BODY/STENT BILIARY/PANC DUCT  1/9/2018         HX CHOLECYSTECTOMY  10/13/2017    laparoscopic cholecystectomy with intraoperative cholangiogram    HX HEENT  05/27/2014    cataract    HX HEENT  05/13/2014    cataract    HX ORTHOPAEDIC      left knee cartilage repair    HX OTHER SURGICAL      surgery on lungs as baby    ID ERCP STENT PLACEMENT BILIARY/PANCREATIC DUCT  10/18/2017         ID ERCP W/SPHINCTEROTOMY/PAPILLOTOMY  10/18/2017            Family History:  Family History   Problem Relation Age of Onset    Cancer Mother     Alzheimer Father        Social History:  Social History     Tobacco Use    Smoking status: Never Smoker    Smokeless tobacco: Never Used   Substance Use Topics    Alcohol use: No    Drug use: No       Allergies:  No Known Allergies      Review of Systems   Review of Systems   Constitutional: Negative for fever. HENT: Negative for congestion. Eyes: Negative. Respiratory: Negative for shortness of breath. Cardiovascular: Negative for chest pain. Gastrointestinal: Negative for abdominal pain. Endocrine: Negative for heat intolerance. Genitourinary: Negative for dysuria. Musculoskeletal: Negative for back pain. Skin: Negative for rash. Allergic/Immunologic: Negative for immunocompromised state. Neurological: Negative for dizziness. Hematological: Does not bruise/bleed easily. Psychiatric/Behavioral: Negative. All other systems reviewed and are negative.       Physical Exam   Physical Exam  Vitals signs and nursing note reviewed. Constitutional:       General: He is not in acute distress. Appearance: He is well-developed. HENT:      Head: Normocephalic and atraumatic. Neck:      Musculoskeletal: Normal range of motion. Cardiovascular:      Rate and Rhythm: Normal rate and regular rhythm. Heart sounds: Normal heart sounds. Pulmonary:      Effort: Pulmonary effort is normal.      Breath sounds: Normal breath sounds. Abdominal:      General: Bowel sounds are normal.      Palpations: Abdomen is soft. Musculoskeletal:         General: Swelling and tenderness present. Comments: Bilateral elbow tenderness, mild edema noted to the right elbow and left ankle. Slight decreased range of motion right elbow   Skin:     General: Skin is warm and dry. Neurological:      General: No focal deficit present. Mental Status: He is alert and oriented to person, place, and time. Coordination: Coordination normal.   Psychiatric:         Mood and Affect: Mood normal.         Behavior: Behavior normal.         Diagnostic Study Results     Labs -   No results found for this or any previous visit (from the past 12 hour(s)). Radiologic Studies -   No orders to display     CT Results  (Last 48 hours)    None        CXR Results  (Last 48 hours)    None          Medical Decision Making   I am the first provider for this patient. I reviewed the vital signs, available nursing notes, past medical history, past surgical history, family history and social history. Vital Signs-Reviewed the patient's vital signs. Patient Vitals for the past 12 hrs:   Temp Pulse Resp BP SpO2   03/10/21 1459 98.3 °F (36.8 °C) 81 16 (!) 141/70 97 %         Records Reviewed: Nursing Notes and Old Medical Records    Provider Notes (Medical Decision Making):   Gout flare    ED Course:   Initial assessment performed.  The patients presenting problems have been discussed, and they are in agreement with the care plan formulated and outlined with them. I have encouraged them to ask questions as they arise throughout their visit. Progress note:    Patient is feeling better. His results were reviewed. He is advised to follow-up and return to ER if worse      Critical Care Time:   0    Disposition:  home    DISCHARGE PLAN:  1. Discharge Medication List as of 3/10/2021  4:28 PM      CONTINUE these medications which have CHANGED    Details   HYDROcodone-acetaminophen (Norco) 5-325 mg per tablet Take 1 Tab by mouth every six (6) hours as needed for Pain for up to 3 days. Max Daily Amount: 4 Tabs., Normal, Disp-10 Tab, R-0         CONTINUE these medications which have NOT CHANGED    Details   Colcrys 0.6 mg tablet Historical Med, MARIA D      albuterol (PROVENTIL VENTOLIN) 2.5 mg /3 mL (0.083 %) nebulizer solution by Nebulization route once., Historical Med      ondansetron (ZOFRAN ODT) 4 mg disintegrating tablet Take 4 mg by mouth every eight (8) hours as needed for Nausea., Historical Med      albuterol (VENTOLIN HFA) 90 mcg/actuation inhaler Take  by inhalation. , Historical Med      amLODIPine (NORVASC) 10 mg tablet Take  by mouth daily. , Historical Med      montelukast (SINGULAIR) 10 mg tablet Take 10 mg by mouth daily. , Historical Med      lisinopril (PRINIVIL, ZESTRIL) 20 mg tablet Take 20 mg by mouth daily. , Historical Med      cholecalciferol (VITAMIN D3) 1,000 unit cap Take 1,000 Units by mouth daily. , Historical Med      ibuprofen (MOTRIN) 600 mg tablet Take 600 mg by mouth two (2) times daily as needed for Pain (Knee pain). , Historical Med      fluticasone (FLONASE) 50 mcg/actuation nasal spray 1 [place] by Both Nostrils route daily as needed for Rhinitis or Allergies. , Historical Med           2.    Follow-up Information     Follow up With Specialties Details Why Contact Info    Ty Benedict MD Internal Medicine  As needed [facility]  [address] [phone] \[facility] [place] [facility]\"" EMERGENCY DEPT Emergency Medicine  If symptoms worsen [address]  [address] [facility]  [phone]        3. Return to ED if worse     Diagnosis     Clinical Impression:   1. Acute gout of left elbow, unspecified cause    2. Acute gout of right elbow, unspecified cause    3. Acute gout of left ankle, unspecified cause        Attestations:    Lela Meadows MD    Please note that this dictation was completed with [employer], the computer voice recognition software. Quite often unanticipated grammatical, syntax, homophones, and other interpretive errors are inadvertently transcribed by the computer software. Please disregard these errors. Please excuse any errors that have escaped final proofreading. Thank you.

## 2021-08-30 ENCOUNTER — HOSPITAL ENCOUNTER (OUTPATIENT)
Dept: LAB | Age: 86
Discharge: HOME | End: 2021-08-30

## 2022-03-20 PROBLEM — K85.90 ACUTE PANCREATITIS: Status: ACTIVE | Noted: 2017-10-13

## 2023-05-11 RX ORDER — COLCHICINE 0.6 MG/1
TABLET ORAL
COMMUNITY
Start: 2020-11-19

## 2023-05-11 RX ORDER — ONDANSETRON 4 MG/1
TABLET, ORALLY DISINTEGRATING ORAL EVERY 8 HOURS PRN
COMMUNITY

## 2023-05-11 RX ORDER — FLUTICASONE PROPIONATE 50 MCG
1 SPRAY, SUSPENSION (ML) NASAL DAILY PRN
COMMUNITY

## 2023-05-11 RX ORDER — MONTELUKAST SODIUM 10 MG/1
10 TABLET ORAL DAILY
COMMUNITY

## 2023-05-11 RX ORDER — AMLODIPINE BESYLATE 10 MG/1
TABLET ORAL DAILY
COMMUNITY

## 2023-05-11 RX ORDER — LISINOPRIL 20 MG/1
20 TABLET ORAL DAILY
COMMUNITY

## 2023-05-11 RX ORDER — IBUPROFEN 600 MG/1
TABLET ORAL 2 TIMES DAILY PRN
COMMUNITY

## 2023-05-11 RX ORDER — ALBUTEROL SULFATE 90 UG/1
AEROSOL, METERED RESPIRATORY (INHALATION)
COMMUNITY

## 2023-05-11 RX ORDER — ALBUTEROL SULFATE 2.5 MG/3ML
SOLUTION RESPIRATORY (INHALATION) ONCE
COMMUNITY

## 2023-06-29 LAB
ALBUMIN SERPL-MCNC: 3.5 G/DL (ref 3.5–5)
ALBUMIN/GLOB SERPL: 1.5 (ref 1.1–2.2)
ALP SERPL-CCNC: 119 U/L (ref 45–117)
ALT SERPL-CCNC: 22 U/L (ref 12–78)
ANION GAP SERPL CALC-SCNC: 8 MMOL/L (ref 5–15)
AST SERPL-CCNC: 22 U/L (ref 15–37)
BASOPHILS # BLD: 0 K/UL (ref 0–0.1)
BASOPHILS NFR BLD: 0 % (ref 0–1)
BILIRUB SERPL-MCNC: 0.9 MG/DL (ref 0.2–1)
BUN SERPL-MCNC: 14 MG/DL (ref 6–20)
BUN/CREAT SERPL: 11 (ref 12–20)
CALCIUM SERPL-MCNC: 9.1 MG/DL (ref 8.5–10.1)
CHLORIDE SERPL-SCNC: 108 MMOL/L (ref 97–108)
CO2 SERPL-SCNC: 26 MMOL/L (ref 21–32)
CREAT SERPL-MCNC: 1.3 MG/DL (ref 0.7–1.3)
DIFFERENTIAL METHOD BLD: ABNORMAL
EOSINOPHIL # BLD: 0 K/UL (ref 0–0.4)
EOSINOPHIL NFR BLD: 0 % (ref 0–7)
ERYTHROCYTE [DISTWIDTH] IN BLOOD BY AUTOMATED COUNT: 13.7 % (ref 11.5–14.5)
GLOBULIN SER CALC-MCNC: 2.3 G/DL (ref 2–4)
GLUCOSE SERPL-MCNC: 91 MG/DL (ref 65–100)
HCT VFR BLD AUTO: 40.5 % (ref 36.6–50.3)
HGB BLD-MCNC: 13.3 G/DL (ref 12.1–17)
IMM GRANULOCYTES # BLD AUTO: 0 K/UL (ref 0–0.04)
IMM GRANULOCYTES NFR BLD AUTO: 0 % (ref 0–0.5)
LYMPHOCYTES # BLD: 0.8 K/UL (ref 0.8–3.5)
LYMPHOCYTES NFR BLD: 18 % (ref 12–49)
MCH RBC QN AUTO: 29.8 PG (ref 26–34)
MCHC RBC AUTO-ENTMCNC: 32.8 G/DL (ref 30–36.5)
MCV RBC AUTO: 90.6 FL (ref 80–99)
MONOCYTES # BLD: 0.7 K/UL (ref 0–1)
MONOCYTES NFR BLD: 17 % (ref 5–13)
NEUTS SEG # BLD: 2.8 K/UL (ref 1.8–8)
NEUTS SEG NFR BLD: 65 % (ref 32–75)
NRBC # BLD: 0 K/UL (ref 0–0.01)
NRBC BLD-RTO: 0 PER 100 WBC
PLATELET # BLD AUTO: 124 K/UL (ref 150–400)
PMV BLD AUTO: 12 FL (ref 8.9–12.9)
POTASSIUM SERPL-SCNC: 3.6 MMOL/L (ref 3.5–5.1)
PROT SERPL-MCNC: 5.8 G/DL (ref 6.4–8.2)
RBC # BLD AUTO: 4.47 M/UL (ref 4.1–5.7)
RBC MORPH BLD: ABNORMAL
SODIUM SERPL-SCNC: 142 MMOL/L (ref 136–145)
WBC # BLD AUTO: 4.3 K/UL (ref 4.1–11.1)

## 2023-07-19 LAB
ALBUMIN SERPL-MCNC: 3.4 G/DL (ref 3.5–5)
ALBUMIN/GLOB SERPL: 1.4 (ref 1.1–2.2)
ALP SERPL-CCNC: 145 U/L (ref 45–117)
ALT SERPL-CCNC: 20 U/L (ref 12–78)
ANION GAP SERPL CALC-SCNC: 4 MMOL/L (ref 5–15)
AST SERPL-CCNC: 17 U/L (ref 15–37)
BASOPHILS # BLD: 0 K/UL (ref 0–0.1)
BASOPHILS NFR BLD: 0 % (ref 0–1)
BILIRUB SERPL-MCNC: 0.7 MG/DL (ref 0.2–1)
BUN SERPL-MCNC: 23 MG/DL (ref 6–20)
BUN/CREAT SERPL: 17 (ref 12–20)
CALCIUM SERPL-MCNC: 8.9 MG/DL (ref 8.5–10.1)
CHLORIDE SERPL-SCNC: 112 MMOL/L (ref 97–108)
CO2 SERPL-SCNC: 26 MMOL/L (ref 21–32)
CREAT SERPL-MCNC: 1.33 MG/DL (ref 0.7–1.3)
DIFFERENTIAL METHOD BLD: NORMAL
EOSINOPHIL # BLD: 0.3 K/UL (ref 0–0.4)
EOSINOPHIL NFR BLD: 6 % (ref 0–7)
ERYTHROCYTE [DISTWIDTH] IN BLOOD BY AUTOMATED COUNT: 13.5 % (ref 11.5–14.5)
GLOBULIN SER CALC-MCNC: 2.4 G/DL (ref 2–4)
GLUCOSE SERPL-MCNC: 95 MG/DL (ref 65–100)
HCT VFR BLD AUTO: 40 % (ref 36.6–50.3)
HGB BLD-MCNC: 12.8 G/DL (ref 12.1–17)
IMM GRANULOCYTES # BLD AUTO: 0 K/UL (ref 0–0.04)
IMM GRANULOCYTES NFR BLD AUTO: 0 % (ref 0–0.5)
LYMPHOCYTES # BLD: 1.3 K/UL (ref 0.8–3.5)
LYMPHOCYTES NFR BLD: 25 % (ref 12–49)
MCH RBC QN AUTO: 29.4 PG (ref 26–34)
MCHC RBC AUTO-ENTMCNC: 32 G/DL (ref 30–36.5)
MCV RBC AUTO: 91.7 FL (ref 80–99)
MONOCYTES # BLD: 0.6 K/UL (ref 0–1)
MONOCYTES NFR BLD: 10 % (ref 5–13)
NEUTS SEG # BLD: 3.1 K/UL (ref 1.8–8)
NEUTS SEG NFR BLD: 59 % (ref 32–75)
NRBC # BLD: 0 K/UL (ref 0–0.01)
NRBC BLD-RTO: 0 PER 100 WBC
PLATELET # BLD AUTO: 181 K/UL (ref 150–400)
PMV BLD AUTO: 11.9 FL (ref 8.9–12.9)
POTASSIUM SERPL-SCNC: 4.1 MMOL/L (ref 3.5–5.1)
PROT SERPL-MCNC: 5.8 G/DL (ref 6.4–8.2)
RBC # BLD AUTO: 4.36 M/UL (ref 4.1–5.7)
SODIUM SERPL-SCNC: 142 MMOL/L (ref 136–145)
WBC # BLD AUTO: 5.3 K/UL (ref 4.1–11.1)

## 2023-08-08 LAB
CK SERPL-CCNC: 90 U/L (ref 39–308)
COMMENT:: NORMAL
IRON SATN MFR SERPL: 26 % (ref 20–50)
IRON SERPL-MCNC: 68 UG/DL (ref 35–150)
SPECIMEN HOLD: NORMAL
TIBC SERPL-MCNC: 258 UG/DL (ref 250–450)

## 2023-08-10 LAB
FOLATE SERPL-MCNC: 4 NG/ML (ref 5–21)
VIT B12 SERPL-MCNC: 213 PG/ML (ref 193–986)

## 2023-11-22 LAB
ALBUMIN SERPL-MCNC: 3.3 G/DL (ref 3.5–5)
ALBUMIN/GLOB SERPL: 1.4 (ref 1.1–2.2)
ALP SERPL-CCNC: 134 U/L (ref 45–117)
ALT SERPL-CCNC: 19 U/L (ref 12–78)
ANION GAP SERPL CALC-SCNC: 4 MMOL/L (ref 5–15)
AST SERPL-CCNC: 12 U/L (ref 15–37)
BASOPHILS # BLD: 0 K/UL (ref 0–0.1)
BASOPHILS NFR BLD: 1 % (ref 0–1)
BILIRUB SERPL-MCNC: 0.8 MG/DL (ref 0.2–1)
BUN SERPL-MCNC: 18 MG/DL (ref 6–20)
BUN/CREAT SERPL: 16 (ref 12–20)
CALCIUM SERPL-MCNC: 9.1 MG/DL (ref 8.5–10.1)
CHLORIDE SERPL-SCNC: 115 MMOL/L (ref 97–108)
CO2 SERPL-SCNC: 25 MMOL/L (ref 21–32)
CREAT SERPL-MCNC: 1.13 MG/DL (ref 0.7–1.3)
DIFFERENTIAL METHOD BLD: NORMAL
EOSINOPHIL # BLD: 0.3 K/UL (ref 0–0.4)
EOSINOPHIL NFR BLD: 6 % (ref 0–7)
ERYTHROCYTE [DISTWIDTH] IN BLOOD BY AUTOMATED COUNT: 13.2 % (ref 11.5–14.5)
GLOBULIN SER CALC-MCNC: 2.3 G/DL (ref 2–4)
GLUCOSE SERPL-MCNC: 97 MG/DL (ref 65–100)
HCT VFR BLD AUTO: 37.6 % (ref 36.6–50.3)
HGB BLD-MCNC: 12.7 G/DL (ref 12.1–17)
IMM GRANULOCYTES # BLD AUTO: 0 K/UL (ref 0–0.04)
IMM GRANULOCYTES NFR BLD AUTO: 0 % (ref 0–0.5)
LYMPHOCYTES # BLD: 1.6 K/UL (ref 0.8–3.5)
LYMPHOCYTES NFR BLD: 30 % (ref 12–49)
MCH RBC QN AUTO: 30.4 PG (ref 26–34)
MCHC RBC AUTO-ENTMCNC: 33.8 G/DL (ref 30–36.5)
MCV RBC AUTO: 90 FL (ref 80–99)
MONOCYTES # BLD: 0.4 K/UL (ref 0–1)
MONOCYTES NFR BLD: 8 % (ref 5–13)
NEUTS SEG # BLD: 2.9 K/UL (ref 1.8–8)
NEUTS SEG NFR BLD: 55 % (ref 32–75)
NRBC # BLD: 0 K/UL (ref 0–0.01)
NRBC BLD-RTO: 0 PER 100 WBC
PLATELET # BLD AUTO: 162 K/UL (ref 150–400)
PMV BLD AUTO: 10.9 FL (ref 8.9–12.9)
POTASSIUM SERPL-SCNC: 3.9 MMOL/L (ref 3.5–5.1)
PROT SERPL-MCNC: 5.6 G/DL (ref 6.4–8.2)
RBC # BLD AUTO: 4.18 M/UL (ref 4.1–5.7)
SODIUM SERPL-SCNC: 144 MMOL/L (ref 136–145)
WBC # BLD AUTO: 5.3 K/UL (ref 4.1–11.1)

## 2024-03-20 LAB
ALBUMIN SERPL-MCNC: 3.3 G/DL (ref 3.5–5)
ALBUMIN/GLOB SERPL: 1.5 (ref 1.1–2.2)
ALP SERPL-CCNC: 147 U/L (ref 45–117)
ALT SERPL-CCNC: 21 U/L (ref 12–78)
ANION GAP SERPL CALC-SCNC: 6 MMOL/L (ref 5–15)
AST SERPL-CCNC: 16 U/L (ref 15–37)
BASOPHILS # BLD: 0 K/UL (ref 0–0.1)
BASOPHILS NFR BLD: 0 % (ref 0–1)
BILIRUB SERPL-MCNC: 0.8 MG/DL (ref 0.2–1)
BUN SERPL-MCNC: 16 MG/DL (ref 6–20)
BUN/CREAT SERPL: 14 (ref 12–20)
CALCIUM SERPL-MCNC: 9.3 MG/DL (ref 8.5–10.1)
CHLORIDE SERPL-SCNC: 114 MMOL/L (ref 97–108)
CO2 SERPL-SCNC: 25 MMOL/L (ref 21–32)
CREAT SERPL-MCNC: 1.16 MG/DL (ref 0.7–1.3)
DIFFERENTIAL METHOD BLD: ABNORMAL
EOSINOPHIL # BLD: 0.3 K/UL (ref 0–0.4)
EOSINOPHIL NFR BLD: 7 % (ref 0–7)
ERYTHROCYTE [DISTWIDTH] IN BLOOD BY AUTOMATED COUNT: 12.9 % (ref 11.5–14.5)
GLOBULIN SER CALC-MCNC: 2.2 G/DL (ref 2–4)
GLUCOSE SERPL-MCNC: 104 MG/DL (ref 65–100)
HCT VFR BLD AUTO: 38.8 % (ref 36.6–50.3)
HGB BLD-MCNC: 12.8 G/DL (ref 12.1–17)
IMM GRANULOCYTES # BLD AUTO: 0 K/UL (ref 0–0.04)
IMM GRANULOCYTES NFR BLD AUTO: 0 % (ref 0–0.5)
LYMPHOCYTES # BLD: 1.5 K/UL (ref 0.8–3.5)
LYMPHOCYTES NFR BLD: 33 % (ref 12–49)
MCH RBC QN AUTO: 30.9 PG (ref 26–34)
MCHC RBC AUTO-ENTMCNC: 33 G/DL (ref 30–36.5)
MCV RBC AUTO: 93.7 FL (ref 80–99)
MONOCYTES # BLD: 0.4 K/UL (ref 0–1)
MONOCYTES NFR BLD: 9 % (ref 5–13)
NEUTS SEG # BLD: 2.3 K/UL (ref 1.8–8)
NEUTS SEG NFR BLD: 51 % (ref 32–75)
NRBC # BLD: 0 K/UL (ref 0–0.01)
NRBC BLD-RTO: 0 PER 100 WBC
PLATELET # BLD AUTO: 145 K/UL (ref 150–400)
PMV BLD AUTO: 12.2 FL (ref 8.9–12.9)
POTASSIUM SERPL-SCNC: 4 MMOL/L (ref 3.5–5.1)
PROT SERPL-MCNC: 5.5 G/DL (ref 6.4–8.2)
RBC # BLD AUTO: 4.14 M/UL (ref 4.1–5.7)
SODIUM SERPL-SCNC: 145 MMOL/L (ref 136–145)
WBC # BLD AUTO: 4.6 K/UL (ref 4.1–11.1)

## 2024-07-17 LAB
ALBUMIN SERPL-MCNC: 3.5 G/DL (ref 3.5–5)
ALBUMIN/GLOB SERPL: 1.5 (ref 1.1–2.2)
ALP SERPL-CCNC: 151 U/L (ref 45–117)
ALT SERPL-CCNC: 22 U/L (ref 12–78)
ANION GAP SERPL CALC-SCNC: 5 MMOL/L (ref 5–15)
AST SERPL-CCNC: 17 U/L (ref 15–37)
BASOPHILS # BLD: 0 K/UL (ref 0–0.1)
BASOPHILS NFR BLD: 0 % (ref 0–1)
BILIRUB SERPL-MCNC: 0.8 MG/DL (ref 0.2–1)
BUN SERPL-MCNC: 20 MG/DL (ref 6–20)
BUN/CREAT SERPL: 16 (ref 12–20)
CALCIUM SERPL-MCNC: 9.3 MG/DL (ref 8.5–10.1)
CHLORIDE SERPL-SCNC: 116 MMOL/L (ref 97–108)
CO2 SERPL-SCNC: 25 MMOL/L (ref 21–32)
CREAT SERPL-MCNC: 1.24 MG/DL (ref 0.7–1.3)
DIFFERENTIAL METHOD BLD: ABNORMAL
EOSINOPHIL # BLD: 0.3 K/UL (ref 0–0.4)
EOSINOPHIL NFR BLD: 6 % (ref 0–7)
ERYTHROCYTE [DISTWIDTH] IN BLOOD BY AUTOMATED COUNT: 13 % (ref 11.5–14.5)
GLOBULIN SER CALC-MCNC: 2.3 G/DL (ref 2–4)
GLUCOSE SERPL-MCNC: 96 MG/DL (ref 65–100)
HCT VFR BLD AUTO: 36 % (ref 36.6–50.3)
HGB BLD-MCNC: 12.4 G/DL (ref 12.1–17)
IMM GRANULOCYTES # BLD AUTO: 0 K/UL (ref 0–0.04)
IMM GRANULOCYTES NFR BLD AUTO: 0 % (ref 0–0.5)
LYMPHOCYTES # BLD: 1.5 K/UL (ref 0.8–3.5)
LYMPHOCYTES NFR BLD: 33 % (ref 12–49)
MCH RBC QN AUTO: 30.5 PG (ref 26–34)
MCHC RBC AUTO-ENTMCNC: 34.4 G/DL (ref 30–36.5)
MCV RBC AUTO: 88.5 FL (ref 80–99)
MONOCYTES # BLD: 0.4 K/UL (ref 0–1)
MONOCYTES NFR BLD: 9 % (ref 5–13)
NEUTS SEG # BLD: 2.4 K/UL (ref 1.8–8)
NEUTS SEG NFR BLD: 52 % (ref 32–75)
NRBC # BLD: 0 K/UL (ref 0–0.01)
NRBC BLD-RTO: 0 PER 100 WBC
PLATELET # BLD AUTO: 154 K/UL (ref 150–400)
PMV BLD AUTO: 11.3 FL (ref 8.9–12.9)
POTASSIUM SERPL-SCNC: 3.8 MMOL/L (ref 3.5–5.1)
PROT SERPL-MCNC: 5.8 G/DL (ref 6.4–8.2)
RBC # BLD AUTO: 4.07 M/UL (ref 4.1–5.7)
SODIUM SERPL-SCNC: 146 MMOL/L (ref 136–145)
WBC # BLD AUTO: 4.7 K/UL (ref 4.1–11.1)

## 2024-09-05 LAB
AMYLASE SERPL-CCNC: 50 U/L (ref 25–115)
COMMENT:: NORMAL
GGT SERPL-CCNC: 58 U/L (ref 15–85)
LIPASE SERPL-CCNC: 23 U/L (ref 13–75)
SPECIMEN HOLD: NORMAL

## 2024-09-06 LAB — ELASTASE PANC STL-MCNT: 633 UG ELAST./G

## 2024-11-19 LAB
ALBUMIN SERPL-MCNC: 3.3 G/DL (ref 3.5–5)
ALBUMIN/GLOB SERPL: 1.4 (ref 1.1–2.2)
ALP SERPL-CCNC: 151 U/L (ref 45–117)
ALT SERPL-CCNC: 21 U/L (ref 12–78)
ANION GAP SERPL CALC-SCNC: 6 MMOL/L (ref 2–12)
AST SERPL-CCNC: 18 U/L (ref 15–37)
BASOPHILS # BLD: 0 K/UL (ref 0–0.1)
BASOPHILS NFR BLD: 1 % (ref 0–1)
BILIRUB SERPL-MCNC: 0.6 MG/DL (ref 0.2–1)
BUN SERPL-MCNC: 22 MG/DL (ref 6–20)
BUN/CREAT SERPL: 19 (ref 12–20)
CALCIUM SERPL-MCNC: 9.3 MG/DL (ref 8.5–10.1)
CHLORIDE SERPL-SCNC: 114 MMOL/L (ref 97–108)
CO2 SERPL-SCNC: 25 MMOL/L (ref 21–32)
CREAT SERPL-MCNC: 1.13 MG/DL (ref 0.7–1.3)
DIFFERENTIAL METHOD BLD: ABNORMAL
EOSINOPHIL # BLD: 0.3 K/UL (ref 0–0.4)
EOSINOPHIL NFR BLD: 5 % (ref 0–7)
ERYTHROCYTE [DISTWIDTH] IN BLOOD BY AUTOMATED COUNT: 12.8 % (ref 11.5–14.5)
GLOBULIN SER CALC-MCNC: 2.3 G/DL (ref 2–4)
GLUCOSE SERPL-MCNC: 94 MG/DL (ref 65–100)
HCT VFR BLD AUTO: 33.6 % (ref 36.6–50.3)
HGB BLD-MCNC: 11.1 G/DL (ref 12.1–17)
IMM GRANULOCYTES # BLD AUTO: 0 K/UL (ref 0–0.04)
IMM GRANULOCYTES NFR BLD AUTO: 0 % (ref 0–0.5)
LYMPHOCYTES # BLD: 1.5 K/UL (ref 0.8–3.5)
LYMPHOCYTES NFR BLD: 29 % (ref 12–49)
MCH RBC QN AUTO: 31 PG (ref 26–34)
MCHC RBC AUTO-ENTMCNC: 33 G/DL (ref 30–36.5)
MCV RBC AUTO: 93.9 FL (ref 80–99)
MONOCYTES # BLD: 0.6 K/UL (ref 0–1)
MONOCYTES NFR BLD: 11 % (ref 5–13)
NEUTS SEG # BLD: 2.7 K/UL (ref 1.8–8)
NEUTS SEG NFR BLD: 54 % (ref 32–75)
NRBC # BLD: 0 K/UL (ref 0–0.01)
NRBC BLD-RTO: 0 PER 100 WBC
PLATELET # BLD AUTO: 140 K/UL (ref 150–400)
PMV BLD AUTO: 11.9 FL (ref 8.9–12.9)
POTASSIUM SERPL-SCNC: 4 MMOL/L (ref 3.5–5.1)
PROT SERPL-MCNC: 5.6 G/DL (ref 6.4–8.2)
RBC # BLD AUTO: 3.58 M/UL (ref 4.1–5.7)
SODIUM SERPL-SCNC: 145 MMOL/L (ref 136–145)
WBC # BLD AUTO: 5.1 K/UL (ref 4.1–11.1)

## 2025-03-05 LAB
FLUAV RNA SPEC QL NAA+PROBE: NOT DETECTED
FLUBV RNA SPEC QL NAA+PROBE: NOT DETECTED
RSV RNA NPH QL NAA+PROBE: NOT DETECTED
SARS-COV-2 RNA RESP QL NAA+PROBE: NOT DETECTED
SOURCE: NORMAL
SOURCE: NORMAL

## 2025-03-15 ENCOUNTER — HOSPITAL ENCOUNTER (INPATIENT)
Facility: HOSPITAL | Age: 89
LOS: 3 days | Discharge: NURSING, REHAB OR LONG-TERM CARE | DRG: 392 | End: 2025-03-18
Attending: EMERGENCY MEDICINE | Admitting: INTERNAL MEDICINE
Payer: MEDICARE

## 2025-03-15 DIAGNOSIS — R19.7 ACUTE DIARRHEA: ICD-10-CM

## 2025-03-15 DIAGNOSIS — E86.0 ACUTE DEHYDRATION: Primary | ICD-10-CM

## 2025-03-15 PROBLEM — N17.9 AKI (ACUTE KIDNEY INJURY): Status: ACTIVE | Noted: 2025-03-15

## 2025-03-15 LAB
ALBUMIN SERPL-MCNC: 3.8 G/DL (ref 3.5–5)
ALBUMIN/GLOB SERPL: 1.3 (ref 1.1–2.2)
ALP SERPL-CCNC: 149 U/L (ref 45–117)
ALT SERPL-CCNC: 34 U/L (ref 12–78)
ANION GAP SERPL CALC-SCNC: 11 MMOL/L (ref 2–12)
APPEARANCE UR: CLEAR
AST SERPL-CCNC: 46 U/L (ref 15–37)
BACTERIA URNS QL MICRO: NEGATIVE /HPF
BASOPHILS # BLD: 0.02 K/UL (ref 0–0.1)
BASOPHILS NFR BLD: 0.3 % (ref 0–1)
BILIRUB SERPL-MCNC: 0.7 MG/DL (ref 0.2–1)
BILIRUB UR QL: NEGATIVE
BUN SERPL-MCNC: 59 MG/DL (ref 6–20)
BUN/CREAT SERPL: 25 (ref 12–20)
CALCIUM SERPL-MCNC: 9.6 MG/DL (ref 8.5–10.1)
CHLORIDE SERPL-SCNC: 110 MMOL/L (ref 97–108)
CO2 SERPL-SCNC: 17 MMOL/L (ref 21–32)
COLOR UR: ABNORMAL
CREAT SERPL-MCNC: 2.37 MG/DL (ref 0.7–1.3)
DIFFERENTIAL METHOD BLD: NORMAL
EOSINOPHIL # BLD: 0.03 K/UL (ref 0–0.4)
EOSINOPHIL NFR BLD: 0.4 % (ref 0–7)
EPITH CASTS URNS QL MICRO: ABNORMAL /LPF
ERYTHROCYTE [DISTWIDTH] IN BLOOD BY AUTOMATED COUNT: 13.4 % (ref 11.5–14.5)
GLOBULIN SER CALC-MCNC: 3 G/DL (ref 2–4)
GLUCOSE SERPL-MCNC: 96 MG/DL (ref 65–100)
GLUCOSE UR STRIP.AUTO-MCNC: NEGATIVE MG/DL
HCT VFR BLD AUTO: 40 % (ref 36.6–50.3)
HGB BLD-MCNC: 13 G/DL (ref 12.1–17)
HGB UR QL STRIP: NEGATIVE
HYALINE CASTS URNS QL MICRO: ABNORMAL /LPF (ref 0–5)
IMM GRANULOCYTES # BLD AUTO: 0.01 K/UL (ref 0–0.04)
IMM GRANULOCYTES NFR BLD AUTO: 0.1 % (ref 0–0.5)
KETONES UR QL STRIP.AUTO: 15 MG/DL
LEUKOCYTE ESTERASE UR QL STRIP.AUTO: NEGATIVE
LIPASE SERPL-CCNC: 20 U/L (ref 13–75)
LYMPHOCYTES # BLD: 1.7 K/UL (ref 0.8–3.5)
LYMPHOCYTES NFR BLD: 23.9 % (ref 12–49)
MAGNESIUM SERPL-MCNC: 2.2 MG/DL (ref 1.6–2.4)
MCH RBC QN AUTO: 30.7 PG (ref 26–34)
MCHC RBC AUTO-ENTMCNC: 32.5 G/DL (ref 30–36.5)
MCV RBC AUTO: 94.3 FL (ref 80–99)
MONOCYTES # BLD: 0.68 K/UL (ref 0–1)
MONOCYTES NFR BLD: 9.6 % (ref 5–13)
NEUTS SEG # BLD: 4.67 K/UL (ref 1.8–8)
NEUTS SEG NFR BLD: 65.7 % (ref 32–75)
NITRITE UR QL STRIP.AUTO: NEGATIVE
NRBC # BLD: 0 K/UL (ref 0–0.01)
NRBC BLD-RTO: 0 PER 100 WBC
PH UR STRIP: 5.5 (ref 5–8)
PLATELET # BLD AUTO: 177 K/UL (ref 150–400)
PMV BLD AUTO: 10.7 FL (ref 8.9–12.9)
POTASSIUM SERPL-SCNC: 4.5 MMOL/L (ref 3.5–5.1)
PROT SERPL-MCNC: 6.8 G/DL (ref 6.4–8.2)
PROT UR STRIP-MCNC: NEGATIVE MG/DL
RBC # BLD AUTO: 4.24 M/UL (ref 4.1–5.7)
RBC #/AREA URNS HPF: ABNORMAL /HPF (ref 0–5)
SODIUM SERPL-SCNC: 138 MMOL/L (ref 136–145)
SP GR UR REFRACTOMETRY: 1.01
URINE CULTURE IF INDICATED: ABNORMAL
UROBILINOGEN UR QL STRIP.AUTO: 0.2 EU/DL (ref 0.2–1)
WBC # BLD AUTO: 7.1 K/UL (ref 4.1–11.1)
WBC URNS QL MICRO: ABNORMAL /HPF (ref 0–4)

## 2025-03-15 PROCEDURE — 83690 ASSAY OF LIPASE: CPT

## 2025-03-15 PROCEDURE — 99285 EMERGENCY DEPT VISIT HI MDM: CPT

## 2025-03-15 PROCEDURE — 2500000003 HC RX 250 WO HCPCS: Performed by: INTERNAL MEDICINE

## 2025-03-15 PROCEDURE — 2580000003 HC RX 258: Performed by: EMERGENCY MEDICINE

## 2025-03-15 PROCEDURE — 96360 HYDRATION IV INFUSION INIT: CPT

## 2025-03-15 PROCEDURE — 83735 ASSAY OF MAGNESIUM: CPT

## 2025-03-15 PROCEDURE — 36415 COLL VENOUS BLD VENIPUNCTURE: CPT

## 2025-03-15 PROCEDURE — 85025 COMPLETE CBC W/AUTO DIFF WBC: CPT

## 2025-03-15 PROCEDURE — 81001 URINALYSIS AUTO W/SCOPE: CPT

## 2025-03-15 PROCEDURE — 96361 HYDRATE IV INFUSION ADD-ON: CPT

## 2025-03-15 PROCEDURE — 93005 ELECTROCARDIOGRAM TRACING: CPT | Performed by: EMERGENCY MEDICINE

## 2025-03-15 PROCEDURE — 2580000003 HC RX 258: Performed by: INTERNAL MEDICINE

## 2025-03-15 PROCEDURE — 80053 COMPREHEN METABOLIC PANEL: CPT

## 2025-03-15 PROCEDURE — 1100000003 HC PRIVATE W/ TELEMETRY

## 2025-03-15 RX ORDER — MONTELUKAST SODIUM 10 MG/1
10 TABLET ORAL DAILY
Status: CANCELLED | OUTPATIENT
Start: 2025-03-15

## 2025-03-15 RX ORDER — SODIUM CHLORIDE 9 MG/ML
INJECTION, SOLUTION INTRAVENOUS CONTINUOUS
Status: ACTIVE | OUTPATIENT
Start: 2025-03-15 | End: 2025-03-16

## 2025-03-15 RX ORDER — 0.9 % SODIUM CHLORIDE 0.9 %
500 INTRAVENOUS SOLUTION INTRAVENOUS ONCE
Status: COMPLETED | OUTPATIENT
Start: 2025-03-15 | End: 2025-03-15

## 2025-03-15 RX ORDER — POTASSIUM CHLORIDE 1500 MG/1
40 TABLET, EXTENDED RELEASE ORAL PRN
Status: DISCONTINUED | OUTPATIENT
Start: 2025-03-15 | End: 2025-03-18 | Stop reason: HOSPADM

## 2025-03-15 RX ORDER — ACETAMINOPHEN 650 MG/1
650 SUPPOSITORY RECTAL EVERY 6 HOURS PRN
Status: DISCONTINUED | OUTPATIENT
Start: 2025-03-15 | End: 2025-03-18 | Stop reason: HOSPADM

## 2025-03-15 RX ORDER — ENOXAPARIN SODIUM 100 MG/ML
30 INJECTION SUBCUTANEOUS DAILY
Status: DISCONTINUED | OUTPATIENT
Start: 2025-03-16 | End: 2025-03-17

## 2025-03-15 RX ORDER — SODIUM CHLORIDE 0.9 % (FLUSH) 0.9 %
5-40 SYRINGE (ML) INJECTION EVERY 12 HOURS SCHEDULED
Status: DISCONTINUED | OUTPATIENT
Start: 2025-03-15 | End: 2025-03-18 | Stop reason: HOSPADM

## 2025-03-15 RX ORDER — SODIUM CHLORIDE 9 MG/ML
INJECTION, SOLUTION INTRAVENOUS PRN
Status: DISCONTINUED | OUTPATIENT
Start: 2025-03-15 | End: 2025-03-18 | Stop reason: HOSPADM

## 2025-03-15 RX ORDER — ACETAMINOPHEN 325 MG/1
650 TABLET ORAL EVERY 6 HOURS PRN
Status: DISCONTINUED | OUTPATIENT
Start: 2025-03-15 | End: 2025-03-17 | Stop reason: SDUPTHER

## 2025-03-15 RX ORDER — ONDANSETRON 2 MG/ML
4 INJECTION INTRAMUSCULAR; INTRAVENOUS EVERY 4 HOURS PRN
Status: DISCONTINUED | OUTPATIENT
Start: 2025-03-15 | End: 2025-03-17 | Stop reason: SDUPTHER

## 2025-03-15 RX ORDER — MAGNESIUM SULFATE IN WATER 40 MG/ML
2000 INJECTION, SOLUTION INTRAVENOUS PRN
Status: DISCONTINUED | OUTPATIENT
Start: 2025-03-15 | End: 2025-03-18 | Stop reason: HOSPADM

## 2025-03-15 RX ORDER — SODIUM CHLORIDE 0.9 % (FLUSH) 0.9 %
5-40 SYRINGE (ML) INJECTION PRN
Status: DISCONTINUED | OUTPATIENT
Start: 2025-03-15 | End: 2025-03-18 | Stop reason: HOSPADM

## 2025-03-15 RX ORDER — POTASSIUM CHLORIDE 7.45 MG/ML
10 INJECTION INTRAVENOUS PRN
Status: DISCONTINUED | OUTPATIENT
Start: 2025-03-15 | End: 2025-03-18 | Stop reason: HOSPADM

## 2025-03-15 RX ORDER — ONDANSETRON 4 MG/1
4 TABLET, ORALLY DISINTEGRATING ORAL EVERY 8 HOURS PRN
Status: DISCONTINUED | OUTPATIENT
Start: 2025-03-15 | End: 2025-03-18 | Stop reason: HOSPADM

## 2025-03-15 RX ORDER — ACETAMINOPHEN 325 MG/1
650 TABLET ORAL EVERY 4 HOURS PRN
Status: DISCONTINUED | OUTPATIENT
Start: 2025-03-15 | End: 2025-03-17 | Stop reason: SDUPTHER

## 2025-03-15 RX ORDER — ONDANSETRON 2 MG/ML
4 INJECTION INTRAMUSCULAR; INTRAVENOUS EVERY 6 HOURS PRN
Status: DISCONTINUED | OUTPATIENT
Start: 2025-03-15 | End: 2025-03-18 | Stop reason: HOSPADM

## 2025-03-15 RX ADMIN — SODIUM CHLORIDE: 0.9 INJECTION, SOLUTION INTRAVENOUS at 19:28

## 2025-03-15 RX ADMIN — SODIUM CHLORIDE 500 ML: 0.9 INJECTION, SOLUTION INTRAVENOUS at 15:46

## 2025-03-15 RX ADMIN — SODIUM CHLORIDE 500 ML: 0.9 INJECTION, SOLUTION INTRAVENOUS at 17:49

## 2025-03-15 RX ADMIN — SODIUM CHLORIDE, PRESERVATIVE FREE 10 ML: 5 INJECTION INTRAVENOUS at 22:54

## 2025-03-15 ASSESSMENT — PAIN SCALES - GENERAL: PAINLEVEL_OUTOF10: 0

## 2025-03-15 ASSESSMENT — LIFESTYLE VARIABLES
HOW MANY STANDARD DRINKS CONTAINING ALCOHOL DO YOU HAVE ON A TYPICAL DAY: PATIENT DOES NOT DRINK
HOW OFTEN DO YOU HAVE A DRINK CONTAINING ALCOHOL: NEVER

## 2025-03-15 NOTE — ED PROVIDER NOTES
[facility] EMERGENCY DEPARTMENT  EMERGENCY DEPARTMENT ENCOUNTER       Pt Name: Zan Ferrara  MRN: [id number]  Birthdate 5/22/1935  Date of evaluation: 3/15/2025  Provider: Isabelle Delgado MD   PCP: Bernardo Rios MD  Note Started: 5:29 PM EDT 3/15/25     CHIEF COMPLAINT       Chief Complaint   Patient presents with   • Diarrhea     Patient BIBEMS from [facility] home with diarrhea, lower abdominal cramps, weakness, decreased appetite x2 days. VSS en route per EMS.         HISTORY OF PRESENT ILLNESS: 1 or more elements      History From: patient, History limited by: none     Zan Ferrara is a 89 y.o. male who presents the chief complaint of diarrhea.       Please See MDM for Additional Details of the HPI/PMH  Nursing Notes were all reviewed and agreed with or any disagreements were addressed in the HPI.     REVIEW OF SYSTEMS        Positives and Pertinent negatives as per HPI.    PAST HISTORY     Past Medical History:  Past Medical History:   Diagnosis Date   • Allergic rhinitis    • Arthritis of left knee    • Elevated alkaline phosphatase level    • Essential hypertension, benign    • Gallstone    • Gout    • Hearing loss    • Musculoskeletal disorder        Past Surgical History:  Past Surgical History:   Procedure Laterality Date   • CHOLECYSTECTOMY  10/13/2017    laparoscopic cholecystectomy with intraoperative cholangiogram   • ERCP REMOVE FOREIGN BODY/STENT BILIARY/PANC DUCT  1/9/2018        • ERCP STENT PLACEMENT BILIARY/PANCREATIC DUCT  10/18/2017        • ERCP W/SPHINCTEROTOMY/PAPILLOTOMY  10/18/2017        • HEENT  05/27/2014    cataract   • HEENT  05/13/2014    cataract   • ORTHOPEDIC SURGERY      left knee cartilage repair   • OTHER SURGICAL HISTORY      surgery on lungs as baby       Family History:  Family History   Problem Relation Age of Onset   • Alzheimer's Disease Father    • Cancer Mother        Social History:  Social History     Tobacco Use   • Smoking status: Never   •

## 2025-03-15 NOTE — ED NOTES
Perri with [facility] called requesting a status update on pt. Primary RN unavailable att.     Phone #: [phone]

## 2025-03-15 NOTE — H&P
History and physical      Demographics    Patient Name  Zan Ferrara   Date of Birth 5/22/1935   Medical Record Number  [id number]    Age  89 y.o.   PCP Bernardo Rios MD   Admit date:  3/15/2025  2:54 PM   Room Number  ER17/17    @ [facility]     Admission Diagnosis:  NADIA (acute kidney injury)       Certification: We are admitting Zan Ferrara 89 y.o. male with a principle diagnosis of NADIA (acute kidney injury)  This patient also suffers from other comorbidities listed below. I have a high level of concern for  leading to life threatening complications      Assessment and plan:   Diarrhea unclear etiology  Hypovolemia  NADIA prerenal  overweight  HTN    89M from [facility].  c/o diarrhea 3-4 days 4 episodes of waterry diarrhea daily.  Some cramping but no iveth abd pain.  Very poor appetite during this period, low PO intake.  no vomiting.  no recent abx use.  given some imodium at [facility] w/o relief.  BP soft but asymptomatic.  Cr 2.3    Admitting for what is likely gastroenteris with NADIA.  stool studies.  hold antidiarrheals for now.  IVF  monitor renal func.  hold home amlodipine and Ace.    Full code  Anticipate >2 night stay; high risk of deterioration due to nadia  Home medications reviewed  35 mins spent with the pt      There is no height or weight on file to calculate BMI. -               History of Present Illness :    89M from [facility].  c/o diarrhea 3-4 days 4 episodes of waterry diarrhea daily.  Some cramping but no iveth abd pain.  Very poor appetite during this period, low PO intake.  no vomiting.  no recent abx use.  given some imodium at [facility] w/o relief.  BP soft but asymptomatic.  Cr 2.3     Review of Systems -     ROS:  CONSTITUTIONAL: Denies weight loss, fever and chills.  HEENT: Denies changes in vision and hearing.  RESPIRATORY: Denies SOB and cough.  CV: Denies palpitations and CP.  GI: see HPI  : Denies dysuria and urinary frequency, or hematuria.  MSK: Denies myalgia and

## 2025-03-15 NOTE — ED NOTES
Pt informed of need for urine sample, states he is unable to provide one at this time. Pt educated on use of call bell before getting out of bed to urinate. Pt verbalized understanding.

## 2025-03-15 NOTE — ED NOTES
Bedside and Verbal shift change report given to Kenrick (oncoming nurse) by Kamala (offgoing nurse). Report included the following information Nurse Handoff Report, ED Encounter Summary, ED SBAR, Adult Overview, Intake/Output, MAR, Recent Results, and Neuro Assessment, any outstanding orders to be completed. Opportunity for questions and clarification provided.

## 2025-03-16 LAB
ANION GAP SERPL CALC-SCNC: 11 MMOL/L (ref 2–12)
BUN SERPL-MCNC: 47 MG/DL (ref 6–20)
BUN/CREAT SERPL: 29 (ref 12–20)
CALCIUM SERPL-MCNC: 9.6 MG/DL (ref 8.5–10.1)
CHLORIDE SERPL-SCNC: 115 MMOL/L (ref 97–108)
CO2 SERPL-SCNC: 16 MMOL/L (ref 21–32)
CREAT SERPL-MCNC: 1.64 MG/DL (ref 0.7–1.3)
EKG ATRIAL RATE: 62 BPM
EKG DIAGNOSIS: NORMAL
EKG P AXIS: 42 DEGREES
EKG P-R INTERVAL: 190 MS
EKG Q-T INTERVAL: 426 MS
EKG QRS DURATION: 84 MS
EKG QTC CALCULATION (BAZETT): 432 MS
EKG R AXIS: 36 DEGREES
EKG T AXIS: 41 DEGREES
EKG VENTRICULAR RATE: 62 BPM
GLUCOSE SERPL-MCNC: 83 MG/DL (ref 65–100)
POTASSIUM SERPL-SCNC: 4.6 MMOL/L (ref 3.5–5.1)
SODIUM SERPL-SCNC: 142 MMOL/L (ref 136–145)

## 2025-03-16 PROCEDURE — 87449 NOS EACH ORGANISM AG IA: CPT

## 2025-03-16 PROCEDURE — 1100000003 HC PRIVATE W/ TELEMETRY

## 2025-03-16 PROCEDURE — 87324 CLOSTRIDIUM AG IA: CPT

## 2025-03-16 PROCEDURE — 36415 COLL VENOUS BLD VENIPUNCTURE: CPT

## 2025-03-16 PROCEDURE — 6360000002 HC RX W HCPCS: Performed by: INTERNAL MEDICINE

## 2025-03-16 PROCEDURE — 2580000003 HC RX 258: Performed by: INTERNAL MEDICINE

## 2025-03-16 PROCEDURE — 80048 BASIC METABOLIC PNL TOTAL CA: CPT

## 2025-03-16 PROCEDURE — 87506 IADNA-DNA/RNA PROBE TQ 6-11: CPT

## 2025-03-16 PROCEDURE — 2500000003 HC RX 250 WO HCPCS: Performed by: INTERNAL MEDICINE

## 2025-03-16 RX ADMIN — ENOXAPARIN SODIUM 30 MG: 100 INJECTION SUBCUTANEOUS at 09:01

## 2025-03-16 RX ADMIN — SODIUM CHLORIDE, PRESERVATIVE FREE 10 ML: 5 INJECTION INTRAVENOUS at 19:38

## 2025-03-16 RX ADMIN — SODIUM CHLORIDE: 0.9 INJECTION, SOLUTION INTRAVENOUS at 04:26

## 2025-03-16 RX ADMIN — SODIUM CHLORIDE: 0.9 INJECTION, SOLUTION INTRAVENOUS at 15:44

## 2025-03-16 ASSESSMENT — PAIN SCALES - GENERAL: PAINLEVEL_OUTOF10: 0

## 2025-03-17 LAB
ANION GAP SERPL CALC-SCNC: 4 MMOL/L (ref 2–12)
BUN SERPL-MCNC: 20 MG/DL (ref 6–20)
BUN/CREAT SERPL: 23 (ref 12–20)
C COLI+JEJUNI TUF STL QL NAA+PROBE: NEGATIVE
C DIFF GDH STL QL: NEGATIVE
C DIFF TOX A+B STL QL IA: NEGATIVE
C DIFF TOXIN INTERPRETATION: NORMAL
CALCIUM SERPL-MCNC: 8.8 MG/DL (ref 8.5–10.1)
CHLORIDE SERPL-SCNC: 117 MMOL/L (ref 97–108)
CO2 SERPL-SCNC: 21 MMOL/L (ref 21–32)
CREAT SERPL-MCNC: 0.87 MG/DL (ref 0.7–1.3)
EC STX1+STX2 GENES STL QL NAA+PROBE: NEGATIVE
ETEC ELTA+ESTB GENES STL QL NAA+PROBE: NEGATIVE
GLUCOSE SERPL-MCNC: 111 MG/DL (ref 65–100)
P SHIGELLOIDES DNA STL QL NAA+PROBE: NEGATIVE
POTASSIUM SERPL-SCNC: 4.1 MMOL/L (ref 3.5–5.1)
SALMONELLA SP SPAO STL QL NAA+PROBE: NEGATIVE
SHIGELLA SP+EIEC IPAH STL QL NAA+PROBE: NEGATIVE
SODIUM SERPL-SCNC: 142 MMOL/L (ref 136–145)
V CHOL+PARA+VUL DNA STL QL NAA+NON-PROBE: NEGATIVE
Y ENTEROCOL DNA STL QL NAA+NON-PROBE: NEGATIVE

## 2025-03-17 PROCEDURE — 2500000003 HC RX 250 WO HCPCS: Performed by: INTERNAL MEDICINE

## 2025-03-17 PROCEDURE — 6360000002 HC RX W HCPCS: Performed by: INTERNAL MEDICINE

## 2025-03-17 PROCEDURE — 80048 BASIC METABOLIC PNL TOTAL CA: CPT

## 2025-03-17 PROCEDURE — 1100000003 HC PRIVATE W/ TELEMETRY

## 2025-03-17 PROCEDURE — 36415 COLL VENOUS BLD VENIPUNCTURE: CPT

## 2025-03-17 RX ORDER — ACETAMINOPHEN 325 MG/1
650 TABLET ORAL EVERY 6 HOURS PRN
Status: DISCONTINUED | OUTPATIENT
Start: 2025-03-17 | End: 2025-03-18 | Stop reason: HOSPADM

## 2025-03-17 RX ORDER — ENOXAPARIN SODIUM 100 MG/ML
40 INJECTION SUBCUTANEOUS DAILY
Status: DISCONTINUED | OUTPATIENT
Start: 2025-03-18 | End: 2025-03-18 | Stop reason: HOSPADM

## 2025-03-17 RX ADMIN — SODIUM CHLORIDE, PRESERVATIVE FREE 10 ML: 5 INJECTION INTRAVENOUS at 08:50

## 2025-03-17 RX ADMIN — ENOXAPARIN SODIUM 30 MG: 100 INJECTION SUBCUTANEOUS at 08:49

## 2025-03-17 RX ADMIN — SODIUM CHLORIDE, PRESERVATIVE FREE 10 ML: 5 INJECTION INTRAVENOUS at 20:34

## 2025-03-17 ASSESSMENT — PAIN SCALES - GENERAL
PAINLEVEL_OUTOF10: 0
PAINLEVEL_OUTOF10: 0

## 2025-03-18 VITALS
SYSTOLIC BLOOD PRESSURE: 135 MMHG | TEMPERATURE: 98.3 F | RESPIRATION RATE: 15 BRPM | HEIGHT: 63 IN | OXYGEN SATURATION: 97 % | DIASTOLIC BLOOD PRESSURE: 72 MMHG | HEART RATE: 71 BPM | WEIGHT: 125 LBS | BODY MASS INDEX: 22.15 KG/M2

## 2025-03-18 LAB
ANION GAP SERPL CALC-SCNC: 7 MMOL/L (ref 2–12)
BUN SERPL-MCNC: 13 MG/DL (ref 6–20)
BUN/CREAT SERPL: 14 (ref 12–20)
CALCIUM SERPL-MCNC: 9.3 MG/DL (ref 8.5–10.1)
CHLORIDE SERPL-SCNC: 112 MMOL/L (ref 97–108)
CO2 SERPL-SCNC: 21 MMOL/L (ref 21–32)
CREAT SERPL-MCNC: 0.92 MG/DL (ref 0.7–1.3)
GLUCOSE SERPL-MCNC: 108 MG/DL (ref 65–100)
POTASSIUM SERPL-SCNC: 4.3 MMOL/L (ref 3.5–5.1)
SODIUM SERPL-SCNC: 140 MMOL/L (ref 136–145)

## 2025-03-18 PROCEDURE — 2500000003 HC RX 250 WO HCPCS: Performed by: INTERNAL MEDICINE

## 2025-03-18 PROCEDURE — 36415 COLL VENOUS BLD VENIPUNCTURE: CPT

## 2025-03-18 PROCEDURE — 80048 BASIC METABOLIC PNL TOTAL CA: CPT

## 2025-03-18 PROCEDURE — 6360000002 HC RX W HCPCS: Performed by: INTERNAL MEDICINE

## 2025-03-18 RX ORDER — AMLODIPINE BESYLATE 5 MG/1
5 TABLET ORAL DAILY
Qty: 30 TABLET | Refills: 0 | Status: SHIPPED
Start: 2025-03-18

## 2025-03-18 RX ADMIN — SODIUM CHLORIDE, PRESERVATIVE FREE 10 ML: 5 INJECTION INTRAVENOUS at 08:09

## 2025-03-18 RX ADMIN — ENOXAPARIN SODIUM 40 MG: 100 INJECTION SUBCUTANEOUS at 08:07

## 2025-03-18 ASSESSMENT — PAIN SCALES - GENERAL: PAINLEVEL_OUTOF10: 0

## 2025-03-18 NOTE — PROGRESS NOTES
Demographics    Patient Name  Zan Ferrara   Date of Birth 5/22/1935   Medical Record Number  [id number]    Age  89 y.o.   PCP Bernardo Rios MD   Admit date:  3/15/2025  2:54 PM   Room Number  3402/01    @ [facility]     Admission Diagnosis:  NADIA (acute kidney injury)       Certification: We are admitting Zan Ferrara 89 y.o. male with a principle diagnosis of NADIA (acute kidney injury)  This patient also suffers from other comorbidities listed below. I have a high level of concern for  leading to life threatening complications      Assessment and plan:   Diarrhea unclear etiology  Hypovolemia  NADIA prerenal  overweight  HTN    89M from [facility].  c/o diarrhea 3-4 days 4 episodes of waterry diarrhea daily.  Some cramping but no iveth abd pain.  Very poor appetite during this period, low PO intake.  no vomiting.  no recent abx use.  given some imodium at [facility] w/o relief.  BP soft but asymptomatic.  Cr 2.3    Admitted for what is likely gastroenteris with NADIA.  stool studies pending.  hold antidiarrheals for now.  Cont IVF  Cr improving.  hold home amlodipine and Ace.        Body mass index is 22.14 kg/m². -               History of Present Illness :    89M from [facility].  c/o diarrhea 3-4 days 4 episodes of waterry diarrhea daily.  Some cramping but no iveth abd pain.  Very poor appetite during this period, low PO intake.  no vomiting.  no recent abx use.  given some imodium at [facility] w/o relief.  BP soft but asymptomatic.  Cr 2.3     Review of Systems -     ROS:  CONSTITUTIONAL: Denies weight loss, fever and chills.  HEENT: Denies changes in vision and hearing.  RESPIRATORY: Denies SOB and cough.  CV: Denies palpitations and CP.  GI: see HPI  : Denies dysuria and urinary frequency, or hematuria.  MSK: Denies myalgia and joint pain.  DERM: Denies rash and pruritus.  NEUROLOGICAL: Denies headache and dizziness.  PSYCHIATRIC: Denies recent changes in mood. Denies anxiety and depression.  
.End of Shift Note    Bedside shift change report given to ELIZABETH Burch (oncoming nurse) by Daniel Waller RN (offgoing nurse).  Report included the following information SBAR, Intake/Output, MAR, and Recent Results    Shift worked:  3428-8448     Shift summary and any significant changes:     Pt given prescribed meds per MAR. No PRN meds given. Pt did not have a BM. Caring rounds completed.           Daniel Waller RN                            
.End of Shift Note    Bedside shift change report given to ELIZABETH Burch (oncoming nurse) by Pooja Vasquez RN (offgoing nurse).  Report included the following information SBAR, Kardex, and MAR    Shift worked: 7a-7p     Shift summary and any significant changes:    Medications given per MAR and education provided. Pt is up x1 to BSC.     Awaiting stool sample results.    Concerns for physician to address: N/A     Zone phone for oncoming shift:  [phone]       Activity:  Level of Assistance: Standby assist, set-up cues, supervision of patient - no hands on  Number times ambulated in hallways past shift: 0  Number of times OOB to chair past shift: 1    Cardiac:   Cardiac Monitoring: No           Access:  Current line(s): PIV     Genitourinary:   Urinary Status: Voiding    Respiratory:   O2 Device: None (Room air)  Chronic home O2 use?: NO  Incentive spirometer at bedside: NO    GI:  Last BM (including prior to admit): 03/17/25  Current diet:  ADULT DIET; Regular  Passing flatus: YES    Pain Management:   Patient states pain is manageable on current regimen: YES    Skin:  Sree Scale Score: 18  Interventions: Wound Offloading (Prevention Methods): Bed, pressure reduction mattress, Elevate heels, Repositioning, Pillows, Turning    Patient Safety:  Fall Risk: Nursing Judgement-Fall Risk High(Add Comments): Yes  Fall Risk Interventions  Nursing Judgement-Fall Risk High(Add Comments): Yes  Toilet Every 2 Hours-In Advance of Need: Yes  Hourly Visual Checks: Awake, In bed  Fall Visual Posted: Armband, Socks  Room Door Open: Deferred to promote rest  Alarm On: Bed  Patient Moved Closer to Nursing Station: No    Active Consults:   IP CONSULT TO HOSPITALIST    Length of Stay:  Expected LOS: 3  Actual LOS: 2    Pooja Vasquez RN                            
.I have reviewed discharge instructions with the patient. The patient verbalized understanding. Discharge medications reviewed with patient and appropriate educational materials and side effects teaching were provided. Follow-up appointments reviewed. Opportunity for questions and clarification was provided.  Venous access removed without difficulty.  Patient's belongings gathered and sent with patient. Patient is ready for discharge.     Pooja Vasquez RN   
Anticoagulation Dosing    Indication:  DVT ppx    Estimated Creatinine Clearance: 46 mL/min (based on SCr of 0.87 mg/dL).    Recent Labs     03/15/25  1536   HGB 13.0   HCT 40.0      ALBUMIN 3.8       Wt Readings from Last 1 Encounters:   03/15/25 56.7 kg (125 lb)     Ht Readings from Last 1 Encounters:   03/15/25 1.6 m (5' 3\")     Body mass index is 22.14 kg/m².    Plan:  Lovenox adjusted to 40 mg sq q24h due to patient renal function and weight.       Thank you,  REILLY CEDENO [facility]          
End of Shift Note    Bedside shift change report given to ELIZABETH Katz (oncoming nurse) by Kiersten Nobles RN (offgoing nurse).  Report included the following information SBAR, Kardex, MAR, and Recent Results    Shift worked:  1900-700     Shift summary and any significant changes:    Pt denied pain during shift. C diff + enteric panel sample sent. Pt SBA to BSC.  Labs collected. Pt safety and caring rounds complete.       Concerns for physician to address:  None     Zone phone for oncoming shift:   [phone]     Activity:  Level of Assistance: Standby assist, set-up cues, supervision of patient - no hands on  Number times ambulated in hallways past shift: 0  Number of times OOB to chair past shift: 0    Cardiac:   Cardiac Monitoring: No           Access:  Current line(s): PIV     Genitourinary:        Respiratory:   O2 Device: None (Room air)  Chronic home O2 use?: NO  Incentive spirometer at bedside: NO    GI:  Last BM (including prior to admit): 03/16/25  Current diet:  ADULT DIET; Regular  Passing flatus: YES    Pain Management:   Patient states pain is manageable on current regimen: YES    Skin:  Sree Scale Score: 20  Interventions: Wound Offloading (Prevention Methods): Repositioning, Elevate heels    Patient Safety:  Fall Risk: Nursing Judgement-Fall Risk High(Add Comments): Yes  Fall Risk Interventions  Nursing Judgement-Fall Risk High(Add Comments): Yes  Toilet Every 2 Hours-In Advance of Need: Yes  Hourly Visual Checks: Awake, In bed  Fall Visual Posted: Socks  Room Door Open: Yes  Alarm On: Bed  Patient Moved Closer to Nursing Station: No    Active Consults:   IP CONSULT TO HOSPITALIST    Length of Stay:  Expected LOS: 2  Actual LOS: 2    Kiersten Nobles RN                            
End of Shift Note    Bedside shift change report given to ELIZABETH Katz (oncoming nurse) by Kiersten Nobles RN (offgoing nurse).  Report included the following information SBAR, Kardex, MAR, and Recent Results    Shift worked:  1900-700     Shift summary and any significant changes:    Pt denied pain during shift. Enteric panel resulted negative. Message sent via perfect serve to NP Dinafederico Calvert to discontinue isolation order. Pt SBA to BSC.  Labs collected. Pt safety and caring rounds complete.       Concerns for physician to address:  None     Zone phone for oncoming shift:   [phone]     Activity:  Level of Assistance: Standby assist, set-up cues, supervision of patient - no hands on  Number times ambulated in hallways past shift: 0  Number of times OOB to chair past shift: 0    Cardiac:   Cardiac Monitoring: No           Access:  Current line(s): PIV     Genitourinary:   Urinary Status: Voiding    Respiratory:   O2 Device: None (Room air)  Chronic home O2 use?: NO  Incentive spirometer at bedside: NO    GI:  Last BM (including prior to admit): 03/17/25  Current diet:  ADULT DIET; Regular  Passing flatus: YES    Pain Management:   Patient states pain is manageable on current regimen: YES    Skin:  Sree Scale Score: 17  Interventions: Wound Offloading (Prevention Methods): Pillows, Repositioning, Turning    Patient Safety:  Fall Risk: Nursing Judgement-Fall Risk High(Add Comments): Yes  Fall Risk Interventions  Nursing Judgement-Fall Risk High(Add Comments): Yes  Toilet Every 2 Hours-In Advance of Need: Yes  Hourly Visual Checks: Awake, In bed  Fall Visual Posted: Armband, Socks  Room Door Open: Yes  Alarm On: Bed  Patient Moved Closer to Nursing Station: Yes    Active Consults:   IP CONSULT TO HOSPITALIST    Length of Stay:  Expected LOS: 3  Actual LOS: 3    Kiersten Nobles, RN                            
End of Shift Note    Bedside shift change report given to ELIZABETH Sanchez (oncoming nurse) by Kiersten Nobles RN (offgoing nurse).  Report included the following information SBAR, Kardex, MAR, and Recent Results    Shift worked:  1900-700     Shift summary and any significant changes:    Pt arrived to unit from ED after receiving report from ELIZABETH Choudhary. Pt dual skin assessment complete. Pt on enteric contact for C.diff rule out, pt needs a stool sample , no BM during shift. Pt denied pain, dyspnea, and N/V during shift. Pt SBA to BSC. Labs collected. Pt safety and caring rounds complete.       Concerns for physician to address:  None     Zone phone for oncoming shift:   [phone]     Activity:  Level of Assistance: Standby assist, set-up cues, supervision of patient - no hands on  Number times ambulated in hallways past shift: 0  Number of times OOB to chair past shift: 0    Cardiac:   Cardiac Monitoring: No           Access:  Current line(s): PIV     Genitourinary:        Respiratory:   O2 Device: None (Room air)  Chronic home O2 use?: NO  Incentive spirometer at bedside: NO    GI:  Last BM (including prior to admit): 03/15/25  Current diet:  ADULT DIET; Regular  Passing flatus: YES    Pain Management:   Patient states pain is manageable on current regimen: YES    Skin:  Sree Scale Score: 18  Interventions: Wound Offloading (Prevention Methods): Pillows, Elevate heels, Turning    Patient Safety:  Fall Risk: Nursing Judgement-Fall Risk High(Add Comments): Yes  Fall Risk Interventions  Nursing Judgement-Fall Risk High(Add Comments): Yes  Toilet Every 2 Hours-In Advance of Need: Yes  Hourly Visual Checks: Awake, In bed  Fall Visual Posted: Armband, Socks, Fall sign posted  Room Door Open: Yes  Alarm On: Bed  Patient Moved Closer to Nursing Station: No    Active Consults:   IP CONSULT TO HOSPITALIST    Length of Stay:  Expected LOS: 4  Actual LOS: 1    Kiersten Nobles RN                            
decision making was performed for this patient who is at high risk for decompensation with multiple organ involvement.   Today total floor/unit time was  minutes while caring for this patient and greater than 50% of that time was spent with patient (and/or family/responsible party) coordinating patient's clinical issues; this includes time spent during multidisciplinary rounds.        Loyd Cee, DO   3/17/2025

## 2025-03-18 NOTE — PLAN OF CARE
Problem: Skin/Tissue Integrity  Goal: Skin integrity remains intact  Description: 1.  Monitor for areas of redness and/or skin breakdown  2.  Assess vascular access sites hourly  3.  Every 4-6 hours minimum:  Change oxygen saturation probe site  4.  Every 4-6 hours:  If on nasal continuous positive airway pressure, respiratory therapy assess nares and determine need for appliance change or resting period  Outcome: Adequate for Discharge  Flowsheets (Taken 3/18/2025 7776 by Elvia Bautista RN)  Skin Integrity Remains Intact: Monitor for areas of redness and/or skin breakdown     Problem: Safety - Adult  Goal: Free from fall injury  Outcome: Adequate for Discharge

## 2025-03-18 NOTE — CARE COORDINATION
03/18/25 1429   Services At/After Discharge   Transition of Care Consult (CM Consult) Assisted Living  ([place])   Services At/After Discharge Assisted living  ([place])    Resource Information Provided? No   Mode of Transport at Discharge BLS  ([facility] at [facility])   Confirm Follow Up Transport Family   Condition of Participation: Discharge Planning   The Patient and/or Patient Representative was provided with a Choice of Provider? Patient   The Patient and/Or Patient Representative agree with the Discharge Plan? Yes   Freedom of Choice list was provided with basic dialogue that supports the patient's individualized plan of care/goals, treatment preferences, and shares the quality data associated with the providers?  Yes     CM aware that pt will d/c on today and transition back to detention: [facility].  CM spoke with Angelina at [facility], and she informed CM of call report number: [phone].  CM arrange transport today at [facility], with [facility].    DELANEY Watts CM  [phone]    
EFREN spoke with Angelina ([phone]/fax: [phone]) at the [facility], regarding pts d/c plans and needs.  Pt is known to live in the [place] side of [facility], and assist with his spouse care.  Pt is able to transition to [facility] at the [facility].      Angelina is requesting that pt work with therapist to determine his baseline.    DELANEY Watts CM  [phone]    
discuss with wife)   Financial Resources Medicare   Community Resources Assisted Living   Social/Functional History   Lives With Other (Comment)  (in assisted living with wife in next room)   Type of Home Senior housing apartment  ([facility])   Home Equipment [facility] - [place]   Active  No  (facility provides transportation)   Discharge Planning   Type of Residence Independent Living   Living Arrangements Spouse/Significant Other   Current Services Prior To Admission None   DME Ordered? No   Type of Home Care Services None   Patient expects to be discharged to: Independent living facility  (need facility to evaluate)     Jory Peterson RN  Care Manager

## 2025-03-18 NOTE — DISCHARGE INSTRUCTIONS
HOSPITALIST DISCHARGE INSTRUCTIONS    NAME: Zan Ferrara   :  1935   MRN:  [id number]     Date/Time:  3/18/2025 12:18 PM    ADMIT DATE: 3/15/2025     DISCHARGE DATE: 3/18/2025     DISCHARGE DIAGNOSIS:  Diarrhea unclear etiology, Likely Viral POA- Cdiff test negative, resolved  Hypovolemia  NADIA prerenal POA- resolved, resumed amlodipine at lower dose 5mg, Dcd Lisinopril   overweight  HTN  Full code    MEDICATIONS:  As per medication reconciliation  list  It is important that you take the medication exactly as they are prescribed.   Keep your medication in the bottles provided by the pharmacist and keep a list of the medication names, dosages, and times to be taken in your wallet.   Do not take other medications without consulting your doctor.     Pain Management: per above medications    What to do at Home    Recommended diet:  cardiac diet    Recommended activity: activity as tolerated    If you have questions regarding the hospital related prescriptions or hospital related issues please call at .    If you experience any of the following symptoms then please call your primary care physician or return to the emergency room if you cannot get hold of your doctor:  Fever, chills, nausea, vomiting, diarrhea, change in mentation, falling, bleeding, shortness of breath,     Follow Up:  PCP  you are to call and set up an appointment to see them in 7-10 days.        Information obtained by :  I understand that if any problems occur once I am at home I am to contact my physician.    I understand and acknowledge receipt of the instructions indicated above.                                                                                                                                           Physician's or R.N.'s Signature                                                                  Date/Time

## 2025-03-18 NOTE — DISCHARGE SUMMARY
HH/PT/OT/RN:    SNF/LTC: [facility] - Care section   MILES:    OTHER:            Code status: Full code  Recommended diet: cardiac diet  Recommended activity: activity as tolerated        Follow up with:   PCP : Bernardo Rios MD    No follow-up provider specified.        Total time in minutes spent coordinating this discharge (includes going over instructions, follow-up, prescriptions, and preparing report for sign off to her PCP) :  35 minutes

## 2025-03-27 LAB
ANION GAP SERPL CALC-SCNC: 4 MMOL/L (ref 2–12)
BUN SERPL-MCNC: 15 MG/DL (ref 6–20)
BUN/CREAT SERPL: 15 (ref 12–20)
CALCIUM SERPL-MCNC: 10 MG/DL (ref 8.5–10.1)
CHLORIDE SERPL-SCNC: 109 MMOL/L (ref 97–108)
CO2 SERPL-SCNC: 29 MMOL/L (ref 21–32)
CREAT SERPL-MCNC: 1.03 MG/DL (ref 0.7–1.3)
GLUCOSE SERPL-MCNC: 105 MG/DL (ref 65–100)
POTASSIUM SERPL-SCNC: 4.2 MMOL/L (ref 3.5–5.1)
SODIUM SERPL-SCNC: 142 MMOL/L (ref 136–145)

## 2025-08-07 LAB
COMMENT:: NORMAL
SPECIMEN HOLD: NORMAL

## 2025-08-08 ENCOUNTER — HOSPITAL ENCOUNTER (EMERGENCY)
Facility: HOSPITAL | Age: 89
Discharge: ANOTHER HOSPITAL | End: 2025-08-08
Attending: STUDENT IN AN ORGANIZED HEALTH CARE EDUCATION/TRAINING PROGRAM
Payer: MEDICARE

## 2025-08-08 ENCOUNTER — APPOINTMENT (OUTPATIENT)
Facility: HOSPITAL | Age: 89
End: 2025-08-08
Payer: MEDICARE

## 2025-08-08 VITALS
SYSTOLIC BLOOD PRESSURE: 157 MMHG | TEMPERATURE: 98.6 F | OXYGEN SATURATION: 95 % | DIASTOLIC BLOOD PRESSURE: 68 MMHG | RESPIRATION RATE: 22 BRPM | HEART RATE: 71 BPM | WEIGHT: 125 LBS | BODY MASS INDEX: 22.14 KG/M2

## 2025-08-08 DIAGNOSIS — J39.2 OROPHARYNGEAL MASS: Primary | ICD-10-CM

## 2025-08-08 DIAGNOSIS — I82.C11 THROMBOSIS OF RIGHT INTERNAL JUGULAR VEIN (HCC): ICD-10-CM

## 2025-08-08 LAB
ALBUMIN SERPL-MCNC: 3.2 G/DL (ref 3.5–5.2)
ALBUMIN SERPL-MCNC: 3.5 G/DL (ref 3.5–5.2)
ALBUMIN/GLOB SERPL: 1.2 (ref 1.1–2.2)
ALBUMIN/GLOB SERPL: 1.6 (ref 1.1–2.2)
ALP SERPL-CCNC: 108 U/L (ref 40–129)
ALP SERPL-CCNC: 114 U/L (ref 40–129)
ALT SERPL-CCNC: 10 U/L (ref 10–50)
ALT SERPL-CCNC: 11 U/L (ref 10–50)
ANION GAP SERPL CALC-SCNC: 11 MMOL/L (ref 2–14)
ANION GAP SERPL CALC-SCNC: 11 MMOL/L (ref 2–14)
APTT PPP: 26.2 SEC (ref 22.1–31)
APTT PPP: 30.9 SEC (ref 22.1–31)
AST SERPL-CCNC: 20 U/L (ref 10–50)
AST SERPL-CCNC: 22 U/L (ref 10–50)
BASOPHILS # BLD: 0.02 K/UL (ref 0–0.1)
BASOPHILS # BLD: 0.03 K/UL (ref 0–0.1)
BASOPHILS NFR BLD: 0.3 % (ref 0–1)
BASOPHILS NFR BLD: 0.4 % (ref 0–1)
BILIRUB SERPL-MCNC: 0.6 MG/DL (ref 0–1.2)
BILIRUB SERPL-MCNC: 0.8 MG/DL (ref 0–1.2)
BUN SERPL-MCNC: 18 MG/DL (ref 8–23)
BUN SERPL-MCNC: 19 MG/DL (ref 8–23)
BUN/CREAT SERPL: 18 (ref 12–20)
BUN/CREAT SERPL: 19 (ref 12–20)
CALCIUM SERPL-MCNC: 10.2 MG/DL (ref 8.2–9.6)
CALCIUM SERPL-MCNC: 10.7 MG/DL (ref 8.2–9.6)
CHLORIDE SERPL-SCNC: 104 MMOL/L (ref 98–107)
CHLORIDE SERPL-SCNC: 106 MMOL/L (ref 98–107)
CO2 SERPL-SCNC: 28 MMOL/L (ref 20–29)
CO2 SERPL-SCNC: 29 MMOL/L (ref 20–29)
COMMENT:: NORMAL
CREAT SERPL-MCNC: 0.93 MG/DL (ref 0.7–1.2)
CREAT SERPL-MCNC: 1.04 MG/DL (ref 0.7–1.2)
DIFFERENTIAL METHOD BLD: ABNORMAL
DIFFERENTIAL METHOD BLD: ABNORMAL
EOSINOPHIL # BLD: 0.08 K/UL (ref 0–0.4)
EOSINOPHIL # BLD: 0.17 K/UL (ref 0–0.4)
EOSINOPHIL NFR BLD: 1 % (ref 0–7)
EOSINOPHIL NFR BLD: 2.8 % (ref 0–7)
ERYTHROCYTE [DISTWIDTH] IN BLOOD BY AUTOMATED COUNT: 12.8 % (ref 11.5–14.5)
ERYTHROCYTE [DISTWIDTH] IN BLOOD BY AUTOMATED COUNT: 12.9 % (ref 11.5–14.5)
GLOBULIN SER CALC-MCNC: 2 G/DL (ref 2–4)
GLOBULIN SER CALC-MCNC: 2.9 G/DL (ref 2–4)
GLUCOSE SERPL-MCNC: 106 MG/DL (ref 65–100)
GLUCOSE SERPL-MCNC: 97 MG/DL (ref 65–100)
HCT VFR BLD AUTO: 38.2 % (ref 36.6–50.3)
HCT VFR BLD AUTO: 40.5 % (ref 36.6–50.3)
HGB BLD-MCNC: 11.9 G/DL (ref 12.1–17)
HGB BLD-MCNC: 13.1 G/DL (ref 12.1–17)
IMM GRANULOCYTES # BLD AUTO: 0.02 K/UL (ref 0–0.04)
IMM GRANULOCYTES # BLD AUTO: 0.02 K/UL (ref 0–0.04)
IMM GRANULOCYTES NFR BLD AUTO: 0.3 % (ref 0–0.5)
IMM GRANULOCYTES NFR BLD AUTO: 0.3 % (ref 0–0.5)
INR PPP: 1.1 (ref 0.9–1.1)
INR PPP: 1.1 (ref 0.9–1.1)
LYMPHOCYTES # BLD: 1.16 K/UL (ref 0.8–3.5)
LYMPHOCYTES # BLD: 1.18 K/UL (ref 0.8–3.5)
LYMPHOCYTES NFR BLD: 15.2 % (ref 12–49)
LYMPHOCYTES NFR BLD: 19.1 % (ref 12–49)
MAGNESIUM SERPL-MCNC: 2 MG/DL (ref 1.7–2.3)
MCH RBC QN AUTO: 29 PG (ref 26–34)
MCH RBC QN AUTO: 30 PG (ref 26–34)
MCHC RBC AUTO-ENTMCNC: 31.2 G/DL (ref 30–36.5)
MCHC RBC AUTO-ENTMCNC: 32.3 G/DL (ref 30–36.5)
MCV RBC AUTO: 92.7 FL (ref 80–99)
MCV RBC AUTO: 93.2 FL (ref 80–99)
MONOCYTES # BLD: 0.47 K/UL (ref 0–1)
MONOCYTES # BLD: 0.61 K/UL (ref 0–1)
MONOCYTES NFR BLD: 7.6 % (ref 5–13)
MONOCYTES NFR BLD: 8 % (ref 5–13)
NEUTS SEG # BLD: 4.31 K/UL (ref 1.8–8)
NEUTS SEG # BLD: 5.74 K/UL (ref 1.8–8)
NEUTS SEG NFR BLD: 69.9 % (ref 32–75)
NEUTS SEG NFR BLD: 75.1 % (ref 32–75)
NRBC # BLD: 0 K/UL (ref 0–0.01)
NRBC # BLD: 0 K/UL (ref 0–0.01)
NRBC BLD-RTO: 0 PER 100 WBC
NRBC BLD-RTO: 0 PER 100 WBC
PLATELET # BLD AUTO: 213 K/UL (ref 150–400)
PLATELET # BLD AUTO: 220 K/UL (ref 150–400)
PMV BLD AUTO: 10.6 FL (ref 8.9–12.9)
PMV BLD AUTO: 11.7 FL (ref 8.9–12.9)
POTASSIUM SERPL-SCNC: 4.3 MMOL/L (ref 3.5–5.1)
POTASSIUM SERPL-SCNC: 4.6 MMOL/L (ref 3.5–5.1)
PROT SERPL-MCNC: 5.2 G/DL (ref 6.4–8.3)
PROT SERPL-MCNC: 6.4 G/DL (ref 6.4–8.3)
PROTHROMBIN TIME: 11.3 SEC (ref 9.2–11.2)
PROTHROMBIN TIME: 11.4 SEC (ref 9.2–11.2)
RBC # BLD AUTO: 4.1 M/UL (ref 4.1–5.7)
RBC # BLD AUTO: 4.37 M/UL (ref 4.1–5.7)
SODIUM SERPL-SCNC: 144 MMOL/L (ref 136–145)
SODIUM SERPL-SCNC: 144 MMOL/L (ref 136–145)
SPECIMEN HOLD: NORMAL
THERAPEUTIC RANGE: NORMAL SECS (ref 58–77)
THERAPEUTIC RANGE: NORMAL SECS (ref 58–77)
TSH, 3RD GENERATION: 0.83 UIU/ML (ref 0.27–4.2)
UFH PPP CHRO-ACNC: <0.1 IU/ML
WBC # BLD AUTO: 6.2 K/UL (ref 4.1–11.1)
WBC # BLD AUTO: 7.6 K/UL (ref 4.1–11.1)

## 2025-08-08 PROCEDURE — 99285 EMERGENCY DEPT VISIT HI MDM: CPT

## 2025-08-08 PROCEDURE — 6360000004 HC RX CONTRAST MEDICATION: Performed by: RADIOLOGY

## 2025-08-08 PROCEDURE — 83735 ASSAY OF MAGNESIUM: CPT

## 2025-08-08 PROCEDURE — 85025 COMPLETE CBC W/AUTO DIFF WBC: CPT

## 2025-08-08 PROCEDURE — 96365 THER/PROPH/DIAG IV INF INIT: CPT

## 2025-08-08 PROCEDURE — 85610 PROTHROMBIN TIME: CPT

## 2025-08-08 PROCEDURE — 85730 THROMBOPLASTIN TIME PARTIAL: CPT

## 2025-08-08 PROCEDURE — 6360000002 HC RX W HCPCS: Performed by: STUDENT IN AN ORGANIZED HEALTH CARE EDUCATION/TRAINING PROGRAM

## 2025-08-08 PROCEDURE — 96366 THER/PROPH/DIAG IV INF ADDON: CPT

## 2025-08-08 PROCEDURE — 70491 CT SOFT TISSUE NECK W/DYE: CPT

## 2025-08-08 PROCEDURE — 84443 ASSAY THYROID STIM HORMONE: CPT

## 2025-08-08 PROCEDURE — 85520 HEPARIN ASSAY: CPT

## 2025-08-08 PROCEDURE — 36415 COLL VENOUS BLD VENIPUNCTURE: CPT

## 2025-08-08 PROCEDURE — 80053 COMPREHEN METABOLIC PANEL: CPT

## 2025-08-08 RX ORDER — HEPARIN SODIUM 10000 [USP'U]/100ML
5-30 INJECTION, SOLUTION INTRAVENOUS CONTINUOUS
Status: DISCONTINUED | OUTPATIENT
Start: 2025-08-08 | End: 2025-08-08 | Stop reason: HOSPADM

## 2025-08-08 RX ORDER — IOPAMIDOL 755 MG/ML
100 INJECTION, SOLUTION INTRAVASCULAR
Status: COMPLETED | OUTPATIENT
Start: 2025-08-08 | End: 2025-08-08

## 2025-08-08 RX ORDER — HEPARIN SODIUM 1000 [USP'U]/ML
80 INJECTION, SOLUTION INTRAVENOUS; SUBCUTANEOUS PRN
Status: DISCONTINUED | OUTPATIENT
Start: 2025-08-08 | End: 2025-08-08 | Stop reason: HOSPADM

## 2025-08-08 RX ORDER — HEPARIN SODIUM 1000 [USP'U]/ML
80 INJECTION, SOLUTION INTRAVENOUS; SUBCUTANEOUS ONCE
Status: COMPLETED | OUTPATIENT
Start: 2025-08-08 | End: 2025-08-08

## 2025-08-08 RX ORDER — HEPARIN SODIUM 1000 [USP'U]/ML
40 INJECTION, SOLUTION INTRAVENOUS; SUBCUTANEOUS PRN
Status: DISCONTINUED | OUTPATIENT
Start: 2025-08-08 | End: 2025-08-08 | Stop reason: HOSPADM

## 2025-08-08 RX ADMIN — IOPAMIDOL 100 ML: 755 INJECTION, SOLUTION INTRAVENOUS at 14:26

## 2025-08-08 RX ADMIN — HEPARIN SODIUM 4500 UNITS: 1000 INJECTION, SOLUTION INTRAVENOUS; SUBCUTANEOUS at 16:42

## 2025-08-08 RX ADMIN — HEPARIN SODIUM AND DEXTROSE 18 UNITS/KG/HR: 10000; 5 INJECTION INTRAVENOUS at 16:45

## 2025-08-08 ASSESSMENT — PAIN - FUNCTIONAL ASSESSMENT: PAIN_FUNCTIONAL_ASSESSMENT: NONE - DENIES PAIN

## 2025-08-19 LAB
ALBUMIN SERPL-MCNC: 2.6 G/DL (ref 3.5–5.2)
ALBUMIN/GLOB SERPL: 1.1 (ref 1.1–2.2)
ALP SERPL-CCNC: 104 U/L (ref 40–129)
ALT SERPL-CCNC: 90 U/L (ref 10–50)
ANION GAP SERPL CALC-SCNC: 10 MMOL/L (ref 2–14)
AST SERPL-CCNC: 104 U/L (ref 10–50)
BILIRUB SERPL-MCNC: 0.5 MG/DL (ref 0–1.2)
BUN SERPL-MCNC: 20 MG/DL (ref 8–23)
BUN/CREAT SERPL: 29 (ref 12–20)
CALCIUM SERPL-MCNC: 9.9 MG/DL (ref 8.2–9.6)
CHLORIDE SERPL-SCNC: 94 MMOL/L (ref 98–107)
CO2 SERPL-SCNC: 28 MMOL/L (ref 20–29)
CREAT SERPL-MCNC: 0.69 MG/DL (ref 0.7–1.2)
GLOBULIN SER CALC-MCNC: 2.4 G/DL (ref 2–4)
GLUCOSE SERPL-MCNC: 148 MG/DL (ref 65–100)
MAGNESIUM SERPL-MCNC: 2 MG/DL (ref 1.7–2.3)
PHOSPHATE SERPL-MCNC: 3.3 MG/DL (ref 2.5–4.5)
POTASSIUM SERPL-SCNC: 4.5 MMOL/L (ref 3.5–5.1)
PROT SERPL-MCNC: 5 G/DL (ref 6.4–8.3)
SODIUM SERPL-SCNC: 133 MMOL/L (ref 136–145)

## 2025-08-20 LAB
ALBUMIN SERPL-MCNC: 2.5 G/DL (ref 3.5–5.2)
ALBUMIN/GLOB SERPL: 1.1 (ref 1.1–2.2)
ALP SERPL-CCNC: 117 U/L (ref 40–129)
ALT SERPL-CCNC: 109 U/L (ref 10–50)
ANION GAP SERPL CALC-SCNC: 9 MMOL/L (ref 2–14)
AST SERPL-CCNC: 96 U/L (ref 10–50)
BILIRUB SERPL-MCNC: 0.4 MG/DL (ref 0–1.2)
BUN SERPL-MCNC: 27 MG/DL (ref 8–23)
BUN/CREAT SERPL: 35 (ref 12–20)
CALCIUM SERPL-MCNC: 9.7 MG/DL (ref 8.2–9.6)
CHLORIDE SERPL-SCNC: 100 MMOL/L (ref 98–107)
CO2 SERPL-SCNC: 29 MMOL/L (ref 20–29)
CREAT SERPL-MCNC: 0.76 MG/DL (ref 0.7–1.2)
GLOBULIN SER CALC-MCNC: 2.3 G/DL (ref 2–4)
GLUCOSE SERPL-MCNC: 137 MG/DL (ref 65–100)
MAGNESIUM SERPL-MCNC: 2 MG/DL (ref 1.7–2.3)
PHOSPHATE SERPL-MCNC: 3.4 MG/DL (ref 2.5–4.5)
POTASSIUM SERPL-SCNC: 4.9 MMOL/L (ref 3.5–5.1)
PROT SERPL-MCNC: 4.9 G/DL (ref 6.4–8.3)
SODIUM SERPL-SCNC: 138 MMOL/L (ref 136–145)

## (undated) DEVICE — KENDALL SCD EXPRESS SLEEVES, KNEE LENGTH, MEDIUM: Brand: KENDALL SCD

## (undated) DEVICE — Device

## (undated) DEVICE — DEVICE LCK BILI RAP EXCHG OLPS --

## (undated) DEVICE — BLADELESS OPTICAL TROCAR WITH FIXATION CANNULA: Brand: VERSAONE

## (undated) DEVICE — HANDLE LT SNAP ON ULT DURABLE LENS FOR TRUMPF ALC DISPOSABLE

## (undated) DEVICE — Z CONVERTED USE 2107985 COVER FLROSCP W36XL28IN 4 SIDE ADH

## (undated) DEVICE — FALCON® SAMPLE CONTAINER, WITH LID, 4.5OZ (110ML), INDIVIDUALLY WRAPPED, STERILE, 100/CASE: Brand: FALCON A CORNING BRAND

## (undated) DEVICE — NDL PRT INJ NSAF BLNT 18GX1.5 --

## (undated) DEVICE — Device: Brand: ENDO SMARTCAP

## (undated) DEVICE — DERMABOND SKIN ADH 0.7ML -- DERMABOND ADVANCED 12/BX

## (undated) DEVICE — 1200 GUARD II KIT W/5MM TUBE W/O VAC TUBE: Brand: GUARDIAN

## (undated) DEVICE — BLADELESS OPTICAL TROCAR WITH FIXATION CANNULA: Brand: VERSAPORT

## (undated) DEVICE — RETRIEVAL BALLOON CATHETER: Brand: EXTRACTOR™ PRO RX

## (undated) DEVICE — BLADE ASSEMB CLP HAIR FINE --

## (undated) DEVICE — FOGARTY ARTERIAL EMBOLECTOMY CATHETER 4F 80CM: Brand: FOGARTY

## (undated) DEVICE — (D)PREP SKN CHLRAPRP APPL 26ML -- CONVERT TO ITEM 371833

## (undated) DEVICE — TROCAR ENDOSCP L100MM DIA5MM BLDELSS STBL SL THRD OPT VW

## (undated) DEVICE — [HIGH FLOW INSUFFLATOR,  DO NOT USE IF PACKAGE IS DAMAGED,  KEEP DRY,  KEEP AWAY FROM SUNLIGHT,  PROTECT FROM HEAT AND RADIOACTIVE SOURCES.]: Brand: PNEUMOSURE

## (undated) DEVICE — SOLUTION IV 1000ML 0.9% SOD CHL

## (undated) DEVICE — SOLUTION IRRIG 1000ML H2O STRL BLT

## (undated) DEVICE — SOLUTION IV 500ML 0.9% SOD CHL FLX CONT

## (undated) DEVICE — SUTURE MCRYL SZ 4-0 L27IN ABSRB UD L19MM PS-2 1/2 CIR PRIM Y426H

## (undated) DEVICE — SURGICAL PROCEDURE KIT GEN LAPAROSCOPY LF

## (undated) DEVICE — INFECTION CONTROL KIT SYS

## (undated) DEVICE — (D)SYR 10ML 1/5ML GRAD NSAF -- PKGING CHANGE USE ITEM 338027

## (undated) DEVICE — SPHINCTEROTOME: Brand: DREAMTOME™ RX 44

## (undated) DEVICE — WIREGUIDED CYTOLOGY BRUSH: Brand: RX CYTOLOGY BRUSH

## (undated) DEVICE — GLOVE SURG SZ 7.5 L11.2IN THK9.8MIL STRW LTX POLYMER BEAD

## (undated) DEVICE — APPLIER CLP M L L11.4IN DIA10MM ENDOSCP ROT MULT FOR LIG

## (undated) DEVICE — SYR 3ML LL TIP 1/10ML GRAD --

## (undated) DEVICE — REM POLYHESIVE ADULT PATIENT RETURN ELECTRODE: Brand: VALLEYLAB

## (undated) DEVICE — BITE BLK ENDOSCP AD 54FR GRN POLYETH ENDOSCP W STRP SLD

## (undated) DEVICE — CATHETER URET 4FR L70CM POLYUR OLV FLX TIP KINK RESIST W/

## (undated) DEVICE — NEEDLE HYPO 22GA L1.5IN BLK S STL HUB POLYPR SHLD REG BVL

## (undated) DEVICE — DEVON™ KNEE AND BODY STRAP 60" X 3" (1.5 M X 7.6 CM): Brand: DEVON